# Patient Record
Sex: FEMALE | Race: WHITE | Employment: OTHER | URBAN - METROPOLITAN AREA
[De-identification: names, ages, dates, MRNs, and addresses within clinical notes are randomized per-mention and may not be internally consistent; named-entity substitution may affect disease eponyms.]

---

## 2017-05-11 PROBLEM — E66.01 MORBID OBESITY (HCC): Chronic | Status: ACTIVE | Noted: 2017-05-11

## 2017-05-11 PROBLEM — M15.9 OSTEOARTHRITIS OF MULTIPLE JOINTS: Chronic | Status: ACTIVE | Noted: 2017-05-11

## 2017-05-11 PROBLEM — E66.01 MORBID OBESITY DUE TO EXCESS CALORIES (HCC): Status: ACTIVE | Noted: 2017-05-11

## 2017-05-11 PROBLEM — I10 ESSENTIAL HYPERTENSION: Chronic | Status: ACTIVE | Noted: 2017-05-11

## 2017-05-11 PROBLEM — E03.9 ACQUIRED HYPOTHYROIDISM: Chronic | Status: ACTIVE | Noted: 2017-05-11

## 2018-05-23 ENCOUNTER — HOSPITAL ENCOUNTER (OUTPATIENT)
Age: 73
Discharge: HOME | End: 2018-05-25
Payer: COMMERCIAL

## 2018-05-23 LAB
ALBUMIN SERPL-MCNC: 3.8 G/DL (ref 3.5–5.2)
ALP BLD-CCNC: 89 U/L (ref 35–104)
ALT SERPL-CCNC: 24 U/L (ref 0–32)
ANION GAP SERPL CALCULATED.3IONS-SCNC: 20 MMOL/L (ref 7–16)
AST SERPL-CCNC: 28 U/L (ref 0–31)
BASOPHILS ABSOLUTE: 0.04 E9/L (ref 0–0.2)
BASOPHILS RELATIVE PERCENT: 0.4 % (ref 0–2)
BILIRUB SERPL-MCNC: 0.4 MG/DL (ref 0–1.2)
BUN BLDV-MCNC: 20 MG/DL (ref 8–23)
CALCIUM SERPL-MCNC: 9.7 MG/DL (ref 8.6–10.2)
CHLORIDE BLD-SCNC: 100 MMOL/L (ref 98–107)
CHOLESTEROL, TOTAL: 163 MG/DL (ref 0–199)
CO2: 21 MMOL/L (ref 22–29)
CREAT SERPL-MCNC: 0.9 MG/DL (ref 0.5–1)
EOSINOPHILS ABSOLUTE: 0.2 E9/L (ref 0.05–0.5)
EOSINOPHILS RELATIVE PERCENT: 2 % (ref 0–6)
GFR AFRICAN AMERICAN: >60
GFR NON-AFRICAN AMERICAN: >60 ML/MIN/1.73
GLUCOSE BLD-MCNC: 125 MG/DL (ref 74–109)
HCT VFR BLD CALC: 45.2 % (ref 34–48)
HDLC SERPL-MCNC: 41 MG/DL
HEMOGLOBIN: 14.2 G/DL (ref 11.5–15.5)
IMMATURE GRANULOCYTES #: 0.04 E9/L
IMMATURE GRANULOCYTES %: 0.4 % (ref 0–5)
LDL CHOLESTEROL CALCULATED: 71 MG/DL (ref 0–99)
LYMPHOCYTES ABSOLUTE: 1.97 E9/L (ref 1.5–4)
LYMPHOCYTES RELATIVE PERCENT: 19.9 % (ref 20–42)
MCH RBC QN AUTO: 28.6 PG (ref 26–35)
MCHC RBC AUTO-ENTMCNC: 31.4 % (ref 32–34.5)
MCV RBC AUTO: 90.9 FL (ref 80–99.9)
MONOCYTES ABSOLUTE: 1.02 E9/L (ref 0.1–0.95)
MONOCYTES RELATIVE PERCENT: 10.3 % (ref 2–12)
NEUTROPHILS ABSOLUTE: 6.61 E9/L (ref 1.8–7.3)
NEUTROPHILS RELATIVE PERCENT: 67 % (ref 43–80)
PDW BLD-RTO: 13.2 FL (ref 11.5–15)
PLATELET # BLD: 320 E9/L (ref 130–450)
PMV BLD AUTO: 9.7 FL (ref 7–12)
POTASSIUM SERPL-SCNC: 4.2 MMOL/L (ref 3.5–5)
RBC # BLD: 4.97 E12/L (ref 3.5–5.5)
SODIUM BLD-SCNC: 141 MMOL/L (ref 132–146)
T4 FREE: 1.47 NG/DL (ref 0.93–1.7)
TOTAL PROTEIN: 7.2 G/DL (ref 6.4–8.3)
TRIGL SERPL-MCNC: 253 MG/DL (ref 0–149)
TSH SERPL DL<=0.05 MIU/L-ACNC: 3.01 UIU/ML (ref 0.27–4.2)
VLDLC SERPL CALC-MCNC: 51 MG/DL
WBC # BLD: 9.9 E9/L (ref 4.5–11.5)

## 2018-05-23 PROCEDURE — 80061 LIPID PANEL: CPT

## 2018-05-23 PROCEDURE — 84443 ASSAY THYROID STIM HORMONE: CPT

## 2018-05-23 PROCEDURE — 85025 COMPLETE CBC W/AUTO DIFF WBC: CPT

## 2018-05-23 PROCEDURE — 80053 COMPREHEN METABOLIC PANEL: CPT

## 2018-05-23 PROCEDURE — 84439 ASSAY OF FREE THYROXINE: CPT

## 2018-06-25 ENCOUNTER — HOSPITAL ENCOUNTER (INPATIENT)
Age: 73
LOS: 9 days | Discharge: HOME HEALTH | DRG: 330 | End: 2018-07-05
Attending: EMERGENCY MEDICINE | Admitting: INTERNAL MEDICINE
Payer: COMMERCIAL

## 2018-06-25 DIAGNOSIS — K92.2 LOWER GI BLEED: Primary | ICD-10-CM

## 2018-06-25 DIAGNOSIS — R10.9 ABDOMINAL PAIN, UNSPECIFIED ABDOMINAL LOCATION: ICD-10-CM

## 2018-06-25 PROCEDURE — 94761 N-INVAS EAR/PLS OXIMETRY MLT: CPT

## 2018-06-25 PROCEDURE — 99285 EMERGENCY DEPT VISIT HI MDM: CPT

## 2018-06-25 ASSESSMENT — PAIN DESCRIPTION - DESCRIPTORS: DESCRIPTORS: CRAMPING

## 2018-06-25 ASSESSMENT — PAIN SCALES - GENERAL: PAINLEVEL_OUTOF10: 2

## 2018-06-25 ASSESSMENT — PAIN DESCRIPTION - PAIN TYPE: TYPE: ACUTE PAIN

## 2018-06-25 ASSESSMENT — PAIN DESCRIPTION - FREQUENCY: FREQUENCY: CONTINUOUS

## 2018-06-25 ASSESSMENT — PAIN DESCRIPTION - ORIENTATION: ORIENTATION: LOWER

## 2018-06-25 ASSESSMENT — PAIN DESCRIPTION - LOCATION: LOCATION: ABDOMEN

## 2018-06-26 ENCOUNTER — ANESTHESIA (OUTPATIENT)
Dept: ENDOSCOPY | Age: 73
DRG: 330 | End: 2018-06-26
Payer: COMMERCIAL

## 2018-06-26 ENCOUNTER — ANESTHESIA EVENT (OUTPATIENT)
Dept: ENDOSCOPY | Age: 73
DRG: 330 | End: 2018-06-26
Payer: COMMERCIAL

## 2018-06-26 ENCOUNTER — APPOINTMENT (OUTPATIENT)
Dept: CT IMAGING | Age: 73
DRG: 330 | End: 2018-06-26
Payer: COMMERCIAL

## 2018-06-26 VITALS
RESPIRATION RATE: 7 BRPM | SYSTOLIC BLOOD PRESSURE: 124 MMHG | OXYGEN SATURATION: 99 % | DIASTOLIC BLOOD PRESSURE: 86 MMHG

## 2018-06-26 PROBLEM — K92.2 GI BLEED: Status: ACTIVE | Noted: 2018-06-26

## 2018-06-26 PROBLEM — M15.9 OSTEOARTHRITIS OF MULTIPLE JOINTS: Status: ACTIVE | Noted: 2018-06-26

## 2018-06-26 PROBLEM — I10 ESSENTIAL HYPERTENSION: Chronic | Status: ACTIVE | Noted: 2018-06-26

## 2018-06-26 PROBLEM — D62 ANEMIA DUE TO BLOOD LOSS, ACUTE: Status: ACTIVE | Noted: 2018-06-26

## 2018-06-26 PROBLEM — E03.9 ACQUIRED HYPOTHYROIDISM: Chronic | Status: ACTIVE | Noted: 2018-06-26

## 2018-06-26 PROBLEM — E66.9 CLASS 2 OBESITY IN ADULT: Status: ACTIVE | Noted: 2017-05-11

## 2018-06-26 PROBLEM — E66.812 CLASS 2 OBESITY IN ADULT: Status: ACTIVE | Noted: 2017-05-11

## 2018-06-26 PROBLEM — K57.93 DIVERTICULITIS OF INTESTINE WITH BLEEDING: Status: ACTIVE | Noted: 2018-06-26

## 2018-06-26 LAB
ABO/RH: NORMAL
ALBUMIN SERPL-MCNC: 2.9 G/DL (ref 3.5–5.2)
ALBUMIN SERPL-MCNC: 3.3 G/DL (ref 3.5–5.2)
ALP BLD-CCNC: 66 U/L (ref 35–104)
ALP BLD-CCNC: 78 U/L (ref 35–104)
ALT SERPL-CCNC: 18 U/L (ref 0–32)
ALT SERPL-CCNC: 22 U/L (ref 0–32)
ANION GAP SERPL CALCULATED.3IONS-SCNC: 12 MMOL/L (ref 7–16)
ANION GAP SERPL CALCULATED.3IONS-SCNC: 7 MMOL/L (ref 7–16)
ANTIBODY SCREEN: NORMAL
APTT: 26.7 SEC (ref 24.5–35.1)
AST SERPL-CCNC: 16 U/L (ref 0–31)
AST SERPL-CCNC: 18 U/L (ref 0–31)
BACTERIA: ABNORMAL /HPF
BASOPHILS ABSOLUTE: 0.04 E9/L (ref 0–0.2)
BASOPHILS ABSOLUTE: 0.06 E9/L (ref 0–0.2)
BASOPHILS RELATIVE PERCENT: 0.3 % (ref 0–2)
BASOPHILS RELATIVE PERCENT: 0.4 % (ref 0–2)
BILIRUB SERPL-MCNC: 0.2 MG/DL (ref 0–1.2)
BILIRUB SERPL-MCNC: <0.2 MG/DL (ref 0–1.2)
BILIRUBIN URINE: NEGATIVE
BLOOD BANK DISPENSE STATUS: NORMAL
BLOOD BANK PRODUCT CODE: NORMAL
BLOOD, URINE: ABNORMAL
BPU ID: NORMAL
BUN BLDV-MCNC: 24 MG/DL (ref 8–23)
BUN BLDV-MCNC: 27 MG/DL (ref 8–23)
CALCIUM SERPL-MCNC: 8.6 MG/DL (ref 8.6–10.2)
CALCIUM SERPL-MCNC: 9.1 MG/DL (ref 8.6–10.2)
CHLORIDE BLD-SCNC: 104 MMOL/L (ref 98–107)
CHLORIDE BLD-SCNC: 108 MMOL/L (ref 98–107)
CHP ED QC CHECK: YES
CLARITY: CLEAR
CO2: 25 MMOL/L (ref 22–29)
CO2: 25 MMOL/L (ref 22–29)
COLOR: YELLOW
CREAT SERPL-MCNC: 1 MG/DL (ref 0.5–1)
CREAT SERPL-MCNC: 1.3 MG/DL (ref 0.5–1)
DESCRIPTION BLOOD BANK: NORMAL
EKG ATRIAL RATE: 83 BPM
EKG P AXIS: 22 DEGREES
EKG P-R INTERVAL: 152 MS
EKG Q-T INTERVAL: 384 MS
EKG QRS DURATION: 90 MS
EKG QTC CALCULATION (BAZETT): 451 MS
EKG R AXIS: 45 DEGREES
EKG T AXIS: 8 DEGREES
EKG VENTRICULAR RATE: 83 BPM
EOSINOPHILS ABSOLUTE: 0.04 E9/L (ref 0.05–0.5)
EOSINOPHILS ABSOLUTE: 0.14 E9/L (ref 0.05–0.5)
EOSINOPHILS RELATIVE PERCENT: 0.3 % (ref 0–6)
EOSINOPHILS RELATIVE PERCENT: 1.1 % (ref 0–6)
EPITHELIAL CELLS, UA: ABNORMAL /HPF
GFR AFRICAN AMERICAN: 49
GFR AFRICAN AMERICAN: >60
GFR NON-AFRICAN AMERICAN: 40 ML/MIN/1.73
GFR NON-AFRICAN AMERICAN: 54 ML/MIN/1.73
GLUCOSE BLD-MCNC: 169 MG/DL (ref 74–109)
GLUCOSE BLD-MCNC: 179 MG/DL
GLUCOSE BLD-MCNC: 182 MG/DL (ref 74–109)
GLUCOSE URINE: NEGATIVE MG/DL
HCT VFR BLD CALC: 25.3 % (ref 34–48)
HCT VFR BLD CALC: 27.9 % (ref 34–48)
HCT VFR BLD CALC: 31.2 % (ref 34–48)
HCT VFR BLD CALC: 31.8 % (ref 34–48)
HCT VFR BLD CALC: 37.3 % (ref 34–48)
HEMOGLOBIN: 10.3 G/DL (ref 11.5–15.5)
HEMOGLOBIN: 10.4 G/DL (ref 11.5–15.5)
HEMOGLOBIN: 12.1 G/DL (ref 11.5–15.5)
HEMOGLOBIN: 8.6 G/DL (ref 11.5–15.5)
HEMOGLOBIN: 9.1 G/DL (ref 11.5–15.5)
IMMATURE GRANULOCYTES #: 0.06 E9/L
IMMATURE GRANULOCYTES #: 0.09 E9/L
IMMATURE GRANULOCYTES %: 0.5 % (ref 0–5)
IMMATURE GRANULOCYTES %: 0.6 % (ref 0–5)
INR BLD: 1
KETONES, URINE: NEGATIVE MG/DL
LACTIC ACID: 1.6 MMOL/L (ref 0.5–2.2)
LEUKOCYTE ESTERASE, URINE: NEGATIVE
LYMPHOCYTES ABSOLUTE: 1.74 E9/L (ref 1.5–4)
LYMPHOCYTES ABSOLUTE: 1.81 E9/L (ref 1.5–4)
LYMPHOCYTES RELATIVE PERCENT: 11.6 % (ref 20–42)
LYMPHOCYTES RELATIVE PERCENT: 14.6 % (ref 20–42)
MCH RBC QN AUTO: 28.5 PG (ref 26–35)
MCH RBC QN AUTO: 28.9 PG (ref 26–35)
MCHC RBC AUTO-ENTMCNC: 32.4 % (ref 32–34.5)
MCHC RBC AUTO-ENTMCNC: 32.7 % (ref 32–34.5)
MCV RBC AUTO: 87.8 FL (ref 80–99.9)
MCV RBC AUTO: 88.3 FL (ref 80–99.9)
METER GLUCOSE: 200 MG/DL (ref 70–110)
MONOCYTES ABSOLUTE: 0.75 E9/L (ref 0.1–0.95)
MONOCYTES ABSOLUTE: 1.13 E9/L (ref 0.1–0.95)
MONOCYTES RELATIVE PERCENT: 5 % (ref 2–12)
MONOCYTES RELATIVE PERCENT: 9.1 % (ref 2–12)
NEUTROPHILS ABSOLUTE: 12.27 E9/L (ref 1.8–7.3)
NEUTROPHILS ABSOLUTE: 9.2 E9/L (ref 1.8–7.3)
NEUTROPHILS RELATIVE PERCENT: 74.4 % (ref 43–80)
NEUTROPHILS RELATIVE PERCENT: 82.1 % (ref 43–80)
NITRITE, URINE: NEGATIVE
PDW BLD-RTO: 13.1 FL (ref 11.5–15)
PDW BLD-RTO: 13.4 FL (ref 11.5–15)
PH UA: 5 (ref 5–9)
PLATELET # BLD: 281 E9/L (ref 130–450)
PLATELET # BLD: 286 E9/L (ref 130–450)
PMV BLD AUTO: 10.4 FL (ref 7–12)
PMV BLD AUTO: 9.4 FL (ref 7–12)
POC ANION GAP: 17
POC BUN: 28
POC CHLORIDE: 105
POC CO2: 25
POC CREATININE: 1.3
POC POTASSIUM: NORMAL
POC SODIUM: 142
POTASSIUM SERPL-SCNC: 4.2 MMOL/L (ref 3.5–5)
POTASSIUM SERPL-SCNC: 4.2 MMOL/L (ref 3.5–5)
PROTEIN UA: NEGATIVE MG/DL
PROTHROMBIN TIME: 11.3 SEC (ref 9.3–12.4)
RBC # BLD: 3.6 E12/L (ref 3.5–5.5)
RBC # BLD: 4.25 E12/L (ref 3.5–5.5)
RBC UA: ABNORMAL /HPF (ref 0–2)
SODIUM BLD-SCNC: 140 MMOL/L (ref 132–146)
SODIUM BLD-SCNC: 141 MMOL/L (ref 132–146)
SPECIFIC GRAVITY UA: 1.01 (ref 1–1.03)
TOTAL PROTEIN: 5.5 G/DL (ref 6.4–8.3)
TOTAL PROTEIN: 6.4 G/DL (ref 6.4–8.3)
TROPONIN: <0.01 NG/ML (ref 0–0.03)
UROBILINOGEN, URINE: 0.2 E.U./DL
WBC # BLD: 12.4 E9/L (ref 4.5–11.5)
WBC # BLD: 15 E9/L (ref 4.5–11.5)
WBC UA: ABNORMAL /HPF (ref 0–5)

## 2018-06-26 PROCEDURE — 3700000001 HC ADD 15 MINUTES (ANESTHESIA): Performed by: INTERNAL MEDICINE

## 2018-06-26 PROCEDURE — 85018 HEMOGLOBIN: CPT

## 2018-06-26 PROCEDURE — C9113 INJ PANTOPRAZOLE SODIUM, VIA: HCPCS | Performed by: INTERNAL MEDICINE

## 2018-06-26 PROCEDURE — 6360000002 HC RX W HCPCS: Performed by: NURSE ANESTHETIST, CERTIFIED REGISTERED

## 2018-06-26 PROCEDURE — 36415 COLL VENOUS BLD VENIPUNCTURE: CPT

## 2018-06-26 PROCEDURE — P9016 RBC LEUKOCYTES REDUCED: HCPCS

## 2018-06-26 PROCEDURE — 74177 CT ABD & PELVIS W/CONTRAST: CPT

## 2018-06-26 PROCEDURE — 2580000003 HC RX 258: Performed by: INTERNAL MEDICINE

## 2018-06-26 PROCEDURE — 93005 ELECTROCARDIOGRAM TRACING: CPT | Performed by: EMERGENCY MEDICINE

## 2018-06-26 PROCEDURE — 87081 CULTURE SCREEN ONLY: CPT

## 2018-06-26 PROCEDURE — 3609027000 HC COLONOSCOPY: Performed by: INTERNAL MEDICINE

## 2018-06-26 PROCEDURE — 85730 THROMBOPLASTIN TIME PARTIAL: CPT

## 2018-06-26 PROCEDURE — 6360000004 HC RX CONTRAST MEDICATION: Performed by: RADIOLOGY

## 2018-06-26 PROCEDURE — C1773 RET DEV, INSERTABLE: HCPCS | Performed by: INTERNAL MEDICINE

## 2018-06-26 PROCEDURE — 86901 BLOOD TYPING SEROLOGIC RH(D): CPT

## 2018-06-26 PROCEDURE — 86900 BLOOD TYPING SEROLOGIC ABO: CPT

## 2018-06-26 PROCEDURE — 83605 ASSAY OF LACTIC ACID: CPT

## 2018-06-26 PROCEDURE — 2580000003 HC RX 258: Performed by: NURSE ANESTHETIST, CERTIFIED REGISTERED

## 2018-06-26 PROCEDURE — 36430 TRANSFUSION BLD/BLD COMPNT: CPT

## 2018-06-26 PROCEDURE — 6370000000 HC RX 637 (ALT 250 FOR IP): Performed by: INTERNAL MEDICINE

## 2018-06-26 PROCEDURE — 6360000002 HC RX W HCPCS: Performed by: INTERNAL MEDICINE

## 2018-06-26 PROCEDURE — 80053 COMPREHEN METABOLIC PANEL: CPT

## 2018-06-26 PROCEDURE — 85025 COMPLETE CBC W/AUTO DIFF WBC: CPT

## 2018-06-26 PROCEDURE — 2580000003 HC RX 258: Performed by: EMERGENCY MEDICINE

## 2018-06-26 PROCEDURE — 85014 HEMATOCRIT: CPT

## 2018-06-26 PROCEDURE — 0DJD8ZZ INSPECTION OF LOWER INTESTINAL TRACT, VIA NATURAL OR ARTIFICIAL OPENING ENDOSCOPIC: ICD-10-PCS | Performed by: INTERNAL MEDICINE

## 2018-06-26 PROCEDURE — 84484 ASSAY OF TROPONIN QUANT: CPT

## 2018-06-26 PROCEDURE — 2000000000 HC ICU R&B

## 2018-06-26 PROCEDURE — 86850 RBC ANTIBODY SCREEN: CPT

## 2018-06-26 PROCEDURE — 81001 URINALYSIS AUTO W/SCOPE: CPT

## 2018-06-26 PROCEDURE — 86923 COMPATIBILITY TEST ELECTRIC: CPT

## 2018-06-26 PROCEDURE — 2709999900 HC NON-CHARGEABLE SUPPLY: Performed by: INTERNAL MEDICINE

## 2018-06-26 PROCEDURE — 85610 PROTHROMBIN TIME: CPT

## 2018-06-26 PROCEDURE — 3700000000 HC ANESTHESIA ATTENDED CARE: Performed by: INTERNAL MEDICINE

## 2018-06-26 PROCEDURE — 82962 GLUCOSE BLOOD TEST: CPT

## 2018-06-26 RX ORDER — 0.9 % SODIUM CHLORIDE 0.9 %
1000 INTRAVENOUS SOLUTION INTRAVENOUS ONCE
Status: COMPLETED | OUTPATIENT
Start: 2018-06-26 | End: 2018-06-26

## 2018-06-26 RX ORDER — 0.9 % SODIUM CHLORIDE 0.9 %
250 INTRAVENOUS SOLUTION INTRAVENOUS ONCE
Status: COMPLETED | OUTPATIENT
Start: 2018-06-26 | End: 2018-06-27

## 2018-06-26 RX ORDER — SENNA PLUS 8.6 MG/1
1 TABLET ORAL NIGHTLY
Status: DISCONTINUED | OUTPATIENT
Start: 2018-06-26 | End: 2018-06-26

## 2018-06-26 RX ORDER — 0.9 % SODIUM CHLORIDE 0.9 %
10 VIAL (ML) INJECTION DAILY
Status: DISCONTINUED | OUTPATIENT
Start: 2018-06-26 | End: 2018-07-05 | Stop reason: HOSPADM

## 2018-06-26 RX ORDER — DEXTROSE AND SODIUM CHLORIDE 5; .45 G/100ML; G/100ML
INJECTION, SOLUTION INTRAVENOUS CONTINUOUS
Status: DISCONTINUED | OUTPATIENT
Start: 2018-06-26 | End: 2018-06-27

## 2018-06-26 RX ORDER — PROPOFOL 10 MG/ML
INJECTION, EMULSION INTRAVENOUS PRN
Status: DISCONTINUED | OUTPATIENT
Start: 2018-06-26 | End: 2018-06-26 | Stop reason: SDUPTHER

## 2018-06-26 RX ORDER — 0.9 % SODIUM CHLORIDE 0.9 %
10 VIAL (ML) INJECTION PRN
Status: DISCONTINUED | OUTPATIENT
Start: 2018-06-26 | End: 2018-07-05 | Stop reason: HOSPADM

## 2018-06-26 RX ORDER — 0.9 % SODIUM CHLORIDE 0.9 %
10 VIAL (ML) INJECTION EVERY 12 HOURS SCHEDULED
Status: DISCONTINUED | OUTPATIENT
Start: 2018-06-26 | End: 2018-07-05 | Stop reason: HOSPADM

## 2018-06-26 RX ORDER — 0.9 % SODIUM CHLORIDE 0.9 %
500 INTRAVENOUS SOLUTION INTRAVENOUS ONCE
Status: COMPLETED | OUTPATIENT
Start: 2018-06-26 | End: 2018-06-26

## 2018-06-26 RX ORDER — LEVOTHYROXINE SODIUM 0.05 MG/1
50 TABLET ORAL DAILY
Status: DISCONTINUED | OUTPATIENT
Start: 2018-06-26 | End: 2018-07-05 | Stop reason: HOSPADM

## 2018-06-26 RX ORDER — PANTOPRAZOLE SODIUM 40 MG/10ML
40 INJECTION, POWDER, LYOPHILIZED, FOR SOLUTION INTRAVENOUS DAILY
Status: DISCONTINUED | OUTPATIENT
Start: 2018-06-26 | End: 2018-06-30

## 2018-06-26 RX ORDER — SODIUM CHLORIDE 9 MG/ML
INJECTION, SOLUTION INTRAVENOUS CONTINUOUS PRN
Status: DISCONTINUED | OUTPATIENT
Start: 2018-06-26 | End: 2018-06-26 | Stop reason: SDUPTHER

## 2018-06-26 RX ORDER — 0.9 % SODIUM CHLORIDE 0.9 %
250 INTRAVENOUS SOLUTION INTRAVENOUS ONCE
Status: COMPLETED | OUTPATIENT
Start: 2018-06-27 | End: 2018-06-27

## 2018-06-26 RX ADMIN — DEXTROSE AND SODIUM CHLORIDE: 5; 450 INJECTION, SOLUTION INTRAVENOUS at 17:07

## 2018-06-26 RX ADMIN — SODIUM CHLORIDE 250 ML: 9 INJECTION, SOLUTION INTRAVENOUS at 12:58

## 2018-06-26 RX ADMIN — Medication 10 ML: at 20:40

## 2018-06-26 RX ADMIN — Medication 10 ML: at 08:38

## 2018-06-26 RX ADMIN — PIPERACILLIN SODIUM AND TAZOBACTAM SODIUM 3.38 G: 3; .375 INJECTION, POWDER, LYOPHILIZED, FOR SOLUTION INTRAVENOUS at 15:08

## 2018-06-26 RX ADMIN — PROPOFOL 950 MG: 10 INJECTION, EMULSION INTRAVENOUS at 18:55

## 2018-06-26 RX ADMIN — PIPERACILLIN SODIUM AND TAZOBACTAM SODIUM 3.38 G: 3; .375 INJECTION, POWDER, LYOPHILIZED, FOR SOLUTION INTRAVENOUS at 08:09

## 2018-06-26 RX ADMIN — METOPROLOL TARTRATE 25 MG: 25 TABLET ORAL at 20:40

## 2018-06-26 RX ADMIN — PANTOPRAZOLE SODIUM 40 MG: 40 INJECTION, POWDER, FOR SOLUTION INTRAVENOUS at 08:08

## 2018-06-26 RX ADMIN — IOPAMIDOL 80 ML: 755 INJECTION, SOLUTION INTRAVENOUS at 01:21

## 2018-06-26 RX ADMIN — SODIUM CHLORIDE 500 ML: 9 INJECTION, SOLUTION INTRAVENOUS at 08:43

## 2018-06-26 RX ADMIN — DEXTROSE AND SODIUM CHLORIDE: 5; 450 INJECTION, SOLUTION INTRAVENOUS at 04:00

## 2018-06-26 RX ADMIN — SODIUM CHLORIDE: 9 INJECTION, SOLUTION INTRAVENOUS at 18:13

## 2018-06-26 RX ADMIN — SODIUM CHLORIDE 1000 ML: 9 INJECTION, SOLUTION INTRAVENOUS at 00:39

## 2018-06-26 RX ADMIN — PIPERACILLIN SODIUM AND TAZOBACTAM SODIUM 3.38 G: 3; .375 INJECTION, POWDER, LYOPHILIZED, FOR SOLUTION INTRAVENOUS at 23:00

## 2018-06-26 ASSESSMENT — PAIN DESCRIPTION - FREQUENCY: FREQUENCY: INTERMITTENT

## 2018-06-26 ASSESSMENT — ENCOUNTER SYMPTOMS
ABDOMINAL PAIN: 0
VOMITING: 0
NAUSEA: 0
EYE REDNESS: 0
SHORTNESS OF BREATH: 0
BLOOD IN STOOL: 1

## 2018-06-26 ASSESSMENT — PAIN SCALES - GENERAL
PAINLEVEL_OUTOF10: 2
PAINLEVEL_OUTOF10: 0
PAINLEVEL_OUTOF10: 0

## 2018-06-26 ASSESSMENT — PAIN DESCRIPTION - PAIN TYPE: TYPE: ACUTE PAIN

## 2018-06-26 ASSESSMENT — PAIN DESCRIPTION - LOCATION: LOCATION: ABDOMEN

## 2018-06-26 ASSESSMENT — PAIN DESCRIPTION - DESCRIPTORS
DESCRIPTORS: CRAMPING
DESCRIPTORS: CRAMPING

## 2018-06-26 ASSESSMENT — PAIN DESCRIPTION - ORIENTATION: ORIENTATION: MID

## 2018-06-26 NOTE — PROGRESS NOTES
Dr Wen Veloz cell phone called to update on patient. H&H dropped to 9.1. Will transfuse 2 PRBC per previous order.

## 2018-06-26 NOTE — H&P
Patient ID:  Patrice Andrade  [id number]  89 y.o.  1945    Chief Complaint: Patient admitted through emergency room with multiple bouts of acute bleeding, had similar episode 4 years ago from diverticular bleed, had CT scan abdomen done. History of present illness:  Patient is a 68 y.o. patient admitted through emergency room with lower abdominal pain, multiple bouts of denies amount of bright red blood per rectum, CT scan showed early diverticulitis with wall thickening of the colon white blood count elevated, patient continues to drop her blood count from 14 g percent down to 10.4 g percent.   Patient Active Problem List   Diagnosis    Essential hypertension    Acquired hypothyroidism    Class 2 obesity in adult    Osteoarthritis of multiple joints    GI bleed    Diverticulitis of intestine with bleeding    Anemia due to blood loss, acute    Essential hypertension    Acquired hypothyroidism     Past Medical History:   Diagnosis Date    Arthritis     History of cardiovascular stress test 6/2015    lexiscan    Hypertension     Sciatica     Right    Spinal headache     Tachycardia     on metoprolol ,paroxysmal, usually at HS    Thyroid disease       Past Surgical History:   Procedure Laterality Date    COLONOSCOPY      ENDOSCOPY, COLON, DIAGNOSTIC      HEEL SPUR SURGERY      KNEE CARTILAGE SURGERY      OTHER SURGICAL HISTORY Right 05/09/2017    right TKA    TUBAL LIGATION        Prescriptions Prior to Admission: metoprolol tartrate (LOPRESSOR) 25 MG tablet, Take 25 mg by mouth 2 times daily  traMADol (ULTRAM) 50 MG tablet, Take 50 mg by mouth every 6 hours as needed for Pain  losartan-hydrochlorothiazide (HYZAAR) 50-12.5 MG per tablet, Take 1 tablet by mouth daily  celecoxib (CELEBREX) 200 MG capsule, Take 200 mg by mouth nightly   levothyroxine (SYNTHROID) 50 MCG tablet, Take 50 mcg by mouth Daily  aspirin 325 MG EC tablet, Take 1 tablet by mouth 2 times daily  Misc Natural Products calculus centrally in the left kidney. No other focal or malleable abdominal solid organs. Bowel and bile ducts and urinary tract appear normal caliber. Multiple colonic diverticula. Mild stranding in the mesentery adjacent to the distal descending and sigmoid segments of the colon. Associated mural thickening. Small nodules within the mesentery ranging up to about 12 mm in diameter. No extraluminal air or fluid collection. Unremarkable arterial enhancement. Scattered atherosclerosis. Multilevel spondylosis. Grade 1 L5-S1 spondylolisthesis. 1. No evidence of urinary or bowel obstruction. 2. Abnormal appearance the distal colon with diverticula, mild mural thickening and mild pericolonic stranding. Small nodules are noted within the mesentery which could be focally enlarged lymph nodes range up to about 12 mm in diameter. Differential includes low-grade diverticulitis or malignancy. Final report differs from that generated by Mirna Laguna. These findings were communicated to radiology department personnel by critical results form for further communication to the requesting physician at the conclusion of this examination. Assessment    Patient Active Problem List   Diagnosis    Essential hypertension    Acquired hypothyroidism    Class 2 obesity in adult    Osteoarthritis of multiple joints    GI bleed    Diverticulitis of intestine with bleeding    Anemia due to blood loss, acute    Essential hypertension    Acquired hypothyroidism       Plan     See my orders    Admit to ICU, tenderness every 6 hours, GI consult, IV Zosyn, SCDs for DVT prophylaxis, continue home medication metoprolol, thyroid, hold losartan, hold Celebrex.     Completed By:  [facility]. Jose Grande MD, [facility].  7:32 AM  6/26/2018      Electronically signed by Sendy Cohen MD on 6/26/18 at 7:32 AM

## 2018-06-26 NOTE — ED TRIAGE NOTES
Pt states that she has had 7 BM with large amounts of blood since 2200. Blood is both bright and some dark per pt. Having some cramping in lower abdomen. Denies Emesis, Denies fever at home.

## 2018-06-26 NOTE — CONSULTS
for Pain   Yes Historical Provider, MD   losartan-hydrochlorothiazide (HYZAAR) 50-12.5 MG per tablet Take 1 tablet by mouth daily   Yes Historical Provider, MD   celecoxib (CELEBREX) 200 MG capsule Take 200 mg by mouth nightly    Yes Historical Provider, MD   levothyroxine (SYNTHROID) 50 MCG tablet Take 50 mcg by mouth Daily   Yes Historical Provider, MD   aspirin 325 MG EC tablet Take 1 tablet by mouth 2 times daily 5/11/17   DO Manuel Boucher Natural Products (GLUCOSAMINE CHONDROITIN ADV PO) Take 1 tablet by mouth daily    Historical Provider, MD   vitamin D-3 (CHOLECALCIFEROL) 5000 UNITS TABS Take 5,000 Units by mouth daily    Historical Provider, MD   aspirin 81 MG chewable tablet Take 81 mg by mouth daily    Historical Provider, MD       ALLERGIES:  Talwin [pentazocine]; Tetracyclines & related; and Codeine    SOCIAL Hx:  Social History     Social History    Marital status: Single     Spouse name: N/A    Number of children: N/A    Years of education: N/A     Occupational History    Not on file. Social History Main Topics    Smoking status: Never Smoker    Smokeless tobacco: Never Used    Alcohol use Yes      Comment: social    Drug use: No    Sexual activity: Not on file     Other Topics Concern    Not on file     Social History Narrative    No narrative on file       PE:  /68   Pulse 76   Temp 97.8 °F (36.6 °C) (Oral)   Resp 21   Ht 5' 4\" (1.626 m)   Wt 242 lb 8 oz (110 kg)   SpO2 98%   BMI 41.63 kg/m²     General: A&Ox 3, friendly, NAD  HEENT: Atraumatic, symmetric, no anterior/posterior lymphadenopathy, moist mucous membranes, PERRL, EOM intact, fair dentition.    Pulm: CTAB, Neg w/r/r, normal chest expansion, no crackles noted  Cardio: RRR, neg m/r/g, nl S1 and S2, no extra heart sounds  Abd.: soft, NT, ND, BS+, no G/R, no HSM  Ext: +2/4 pulse Dp and radial b/l, LE and UE ROM intact,   Skin: No lesions, excoriations, petechiae, or ecchymoses noted    Neuro: normal sensation throughout, DTRs patellar, tricept, and bicept 2/4 b/l and equal, normal muscle strength throughout. DATA:     Lab Results   Component Value Date    WBC 15.0 06/26/2018    RBC 3.60 06/26/2018    HGB 10.4 06/26/2018    HCT 31.8 06/26/2018    MCV 88.3 06/26/2018    MCH 28.9 06/26/2018    MCHC 32.7 06/26/2018    RDW 13.4 06/26/2018     06/26/2018    MPV 10.4 06/26/2018     Lab Results   Component Value Date     06/26/2018    K 4.2 06/26/2018     06/26/2018    CO2 25 06/26/2018    BUN 24 06/26/2018    CREATININE 1.0 06/26/2018    CALCIUM 8.6 06/26/2018    PROT 5.5 06/26/2018    LABALBU 2.9 06/26/2018    LABALBU 4.2 03/22/2012    BILITOT 0.2 06/26/2018    ALKPHOS 66 06/26/2018    AST 16 06/26/2018    ALT 18 06/26/2018     No results found for: LIPASE  No results found for: AMYLASE      ASSESSMENT/PLAN:  1. Acute blood loss anemia, Hematochezia: likely diverticular given CT results. CT revealing distal diverticula, mild mural thickening and pericolonic stranding. Small nodules in mesentery ~1.2cm. No free air noted on exam. No peritoneal signs. · H&H Q6hr, will defer to primary for transfusion needs  · NPO status sips with meds, started last night  · Keep 2 units on hold at all times  · She is on no Anticoagulation, NSAID, and antiplatelets  · Will discuss with Dr. Teresa Zhao and potentially plan on colonoscopy with clipping/injection, if so we will order a rapid purge prep, 3 tap water enema an hour apart the last given ~1hr prior to the procedure. Other medical issues managed on this admission  · OA  · HTN  · Sciatica  · Thyroid disease    Pt was seen, d/w, and examined with Dr. Teresa Zhao and Dr. Ese Preston, DO  6/26/2018  8:19 AM     Pt seen and independently examined. D/w Dr. Jailene Schwab with physical exam and A&P. Above discussed with the patient - all questions answered to her satisfaction and she is agreeable with the plan as delineated.   Thank you for the opportunity to see this patient in consultation.       Carli Robison MD  6/26/2018  1:02 PM

## 2018-06-26 NOTE — PLAN OF CARE
Problem: Fluid Volume - Imbalance:  Goal: Will show no signs and symptoms of excessive bleeding  Will show no signs and symptoms of excessive bleeding  Outcome: Not Met This Shift  Active lower bright red blood from rectum  Goal: Absence of imbalanced fluid volume signs and symptoms  Absence of imbalanced fluid volume signs and symptoms  Outcome: Not Met This Shift

## 2018-06-26 NOTE — ED PROVIDER NOTES
Chief complaint: Rectal bleeding and abdominal pain    HPI    The patient is a 80-year-old female presenting to the emergency department with the chief complaint of rectal bleeding and abdominal pain. The patient states that she began approximately 2 hours prior to arrival in the emergency department with rectal bleeding and abdominal pain. She felt she was going to have an episode of diarrhea and went to the bathroom did have a large bloody bowel movement. This was dark and bright red in nature. The patient has had 8 bowel movements in the last 2 hours. She does have a left cramping abdominal pain which is present prior to her having a bloody bowel movement and then resolve with having the bloody bowel movement. The patient has not tried any treatments prior to arrival. Nothing makes the patient's symptoms worsen. The cramping is mildly relieved with having a bowel movement. The patient is not on any blood thinners but does take aspirin on a daily basis. She does have a history of rectal bleeding which was secondary to diverticulitis. She states this does feel very similar. This happened 4 years ago. She did follow with gastroenterology. The patient does complain of feeling lightheaded at the current time as she is going to pass out. This is worse with standing up. She denies any fevers, chills, chest pain, shortness of breath, dysuria or hematuria. Review of Systems   Constitutional: Positive for fatigue. Negative for chills. HENT: Negative for congestion. Eyes: Negative for redness. Respiratory: Negative for shortness of breath. Cardiovascular: Negative for chest pain. Gastrointestinal: Positive for blood in stool. Negative for abdominal pain, nausea and vomiting. Genitourinary: Negative for dysuria. Musculoskeletal: Negative for arthralgias. Skin: Negative for rash. Neurological: Positive for dizziness and light-headedness. Psychiatric/Behavioral: Negative for confusion.        Physical Exam   Constitutional: She is oriented to person, place, and time. She appears well-developed and well-nourished. Non-toxic appearance. No distress. HENT:   Head: Normocephalic and atraumatic. Right Ear: Hearing and external ear normal.   Left Ear: Hearing and external ear normal.   Nose: Nose normal.   Mouth/Throat: Mucous membranes are normal.   Eyes: Conjunctivae are normal. No scleral icterus. Cardiovascular: Normal rate, regular rhythm, S1 normal, S2 normal, normal heart sounds and normal pulses. Pulmonary/Chest: Effort normal and breath sounds normal. No accessory muscle usage. No respiratory distress. She has no decreased breath sounds. She has no wheezes. She has no rhonchi. She has no rales. Abdominal: Soft. There is tenderness in the left upper quadrant and left lower quadrant. There is no rigidity, no rebound and no guarding. The patient's abdomen is soft, mildly tender in the left lower and upper quadrant. No rebound, rigidity or guarding. Genitourinary:   Genitourinary Comments: There is bright red guiac positive blood present on rectal examination. No external hemorrhoids. Musculoskeletal: Normal range of motion. Neurological: She is alert and oriented to person, place, and time. GCS eye subscore is 4. GCS verbal subscore is 5. GCS motor subscore is 6. Skin: Skin is warm and dry. She is not diaphoretic. Psychiatric: She has a normal mood and affect. Procedures    MDM    ED Course as of Jun 26 0238 Tue Jun 26, 2018   0117 The patient is currently in imaging. [MT]   2403 The patient sitting in the bed in no acute distress. The results of today to this point were discussed with the patient. Pending CT results. The patient is agreeable to admission. [MT]      ED Course User Index  [MT] Leydi Carlton, DO       ED Course as of Jun 26 0238   Tue Jun 26, 2018   0117 The patient is currently in imaging. [MT]   4400 The patient sitting in the bed in no acute distress.  The results of today to this point were discussed with the patient. Pending CT results. The patient is agreeable to admission. [MT]      ED Course User Index  [MT] Tracy Kaplan DO     The patient is a 67-year-old female presenting to the emergency department the chief complaint of abdominal pain and rectal bleeding. Patient did have one similar imaging was reviewed. Patient did have large bloody bowel movement in the emergency department. Did receive IV fluids and had improvement in her blood pressure. She'll be admitted for further treatment and evaluation.  --------------------------------------------- PAST HISTORY ---------------------------------------------  Past Medical History:  has a past medical history of Arthritis; History of cardiovascular stress test; Hypertension; Sciatica; Spinal headache; Tachycardia; and Thyroid disease. Past Surgical History:  has a past surgical history that includes Tubal ligation; Heel spur surgery; Knee cartilage surgery; Colonoscopy; Endoscopy, colon, diagnostic; and other surgical history (Right, 05/09/2017). Social History:  reports that she has never smoked. She has never used smokeless tobacco. She reports that she drinks alcohol. She reports that she does not use drugs. Family History: family history includes Cancer in her father and mother. The patients home medications have been reviewed. Allergies: Talwin [pentazocine];  Tetracyclines & related; and Codeine    -------------------------------------------------- RESULTS -------------------------------------------------    LABS:  Results for orders placed or performed during the hospital encounter of 06/25/18   CBC auto differential   Result Value Ref Range    WBC 12.4 (H) 4.5 - 11.5 E9/L    RBC 4.25 3.50 - 5.50 E12/L    Hemoglobin 12.1 11.5 - 15.5 g/dL    Hematocrit 37.3 34.0 - 48.0 %    MCV 87.8 80.0 - 99.9 fL    MCH 28.5 26.0 - 35.0 pg    MCHC 32.4 32.0 - 34.5 %    RDW 13.1 11.5 - 15.0 fL    Platelets (Results Pending)      CT abdomen and pelvis read by Yehuda: No inflammatory changes in the abdomen or pelvis. Left colonic diverticulosis but no CT evidence for diverticulitis. No bowel structure in. No free air. EKG: This EKG is signed and interpreted by me. Rate: 83  Rhythm: Normal sinus rhythm  Interpretation: Normal sinus rhythm with no acute ischemic changes. Comparison: stable as compared to patient's most recent EKG      ------------------------- NURSING NOTES AND VITALS REVIEWED ---------------------------  Date / Time Roomed:  6/25/2018 11:53 PM  ED Bed Assignment:  14/14    The nursing notes within the ED encounter and vital signs as below have been reviewed. Patient Vitals for the past 24 hrs:   BP Temp Temp src Pulse Resp SpO2 Height Weight   06/26/18 0229 122/70 98.6 °F (37 °C) Oral 72 20 98 % - -   06/26/18 0125 132/74 98.4 °F (36.9 °C) Oral 74 18 98 % - -   06/25/18 2357 103/88 97.6 °F (36.4 °C) Oral 94 20 98 % 5' 4\" (1.626 m) 245 lb 3.2 oz (111.2 kg)       Oxygen Saturation Interpretation: Normal    ------------------------------------------ PROGRESS NOTES ------------------------------------------  Re-evaluation(s):  See ED course    Counseling:  I have spoken with the patient and discussed todays results, in addition to providing specific details for the plan of care and counseling regarding the diagnosis and prognosis. Their questions are answered at this time and they are agreeable with the plan of admission.    --------------------------------- ADDITIONAL PROVIDER NOTES ---------------------------------  Consultations:  Time: 2501 [facility] with Dr. Isaura Alvarez  Discussed case. They will admit the patient.   This patient's ED course included: a personal history and physicial examination, re-evaluation prior to disposition, multiple bedside re-evaluations, cardiac monitoring and continuous pulse oximetry    This patient has remained hemodynamically stable during their ED course. Diagnosis:  1. Lower GI bleed    2. Abdominal pain, unspecified abdominal location        Disposition:  Patient's disposition: Admit to telemetry  Patient's condition is serious. Gualberto Heard DO  Resident  06/26/18 [id number],   Resident  06/26/18 5908      ATTENDING PROVIDER ATTESTATION:     I have personally performed and/or participated in the history, exam, medical decision making, and procedures and agree with all pertinent clinical information. I have also reviewed and agree with the past medical, family and social history unless otherwise noted. I have discussed this patient in detail with the resident, and provided the instruction and education regarding rectal bleeding, abdominal pain and diverticulitis. My findings/Plan: Heart RRR. Lungs CTA bilaterally. Abdomen soft. Tender LUQ, LLQ. Bowel sounds normal. Will admit for further evaluation and treatment.          0470 [facility],   06/26/18 3500

## 2018-06-26 NOTE — PROGRESS NOTES
Patient A&Ox4 on room air, assisted patient to bathroom, ambulated with no issues. Patient had large bloody BM, became diaphoretic, and fainted. At that time patients heart rate went into the 40's, BP and O2 stable. RRT called. Patient started talking so nurses put patient back into the bed. , HR 76, /65, 97 on room air. Patient pale still. Stat H&H sent, NS bolus. GI doctors notified and will notify Kyaw Calderon when H&H comes back.  Will continue to monitor patient

## 2018-06-26 NOTE — SIGNIFICANT EVENT
Hospitalist RRT/Code Blue Note    Subjective:    Called to bedside for RRT related to syncopal episode. Patient here with lower GI bleed. She was sitting on commode having bloody bowel movement when she got diaphoretic and then lost consciousness. Nurse stated she got bradycardic on monitor into the 40's. Staff positioned bed closer to the commode and they're able to help her into bed. When I arrived,, patient was back in bed she was awake looked a little pale and diaphoretic. She was answering questions appropriately. Initial blood pressure is 172/79 supine. .  metoprolol tartrate  25 mg Oral BID    levothyroxine  50 mcg Oral Daily    pantoprazole  40 mg Intravenous Daily    And    sodium chloride (PF)  10 mL Intravenous Daily    piperacillin-tazobactam  3.375 g Intravenous Q8H    sodium chloride  500 mL Intravenous Once    sodium chloride  250 mL Intravenous Once           Objective:    /68   Pulse 76   Temp 97.8 °F (36.6 °C) (Oral)   Resp 21   Ht 5' 4\" (1.626 m)   Wt 242 lb 8 oz (110 kg)   SpO2 98%   BMI 41.63 kg/m²      General Appearance: alert but appears pale and face diaphoretic. Skin: cool and clammy.   Head: normocephalic and atraumatic  Eyes: pupils equal, round, and reactive to light, extraocular eye movements intact, conjunctivae normal  ENT: external ear and ear canal normal bilaterally, nose without deformity  Neck: supple and non-tender without mass, no cervical lymphadenopathy  Pulmonary/Chest: clear to auscultation bilaterally- no wheezes, rales or rhonchi  Cardiovascular: normal rate, regular rhythm, normal S1 and S2, no murmurs, rubs, clicks, or gallops  Abdomen: soft, non-tender, non-distended, normal bowel sounds, no masses or organomegaly  Extremities: no cyanosis, clubbing or edema        Recent Labs      06/26/18   0040  06/26/18   0045  06/26/18   0600   NA  141   --   140   K  4.2   --   4.2   CL  104   --   108*   CO2  25   --   25   BUN  27*   --

## 2018-06-27 ENCOUNTER — ANESTHESIA (OUTPATIENT)
Dept: ENDOSCOPY | Age: 73
DRG: 330 | End: 2018-06-27
Payer: COMMERCIAL

## 2018-06-27 ENCOUNTER — ANESTHESIA EVENT (OUTPATIENT)
Dept: ENDOSCOPY | Age: 73
DRG: 330 | End: 2018-06-27
Payer: COMMERCIAL

## 2018-06-27 ENCOUNTER — APPOINTMENT (OUTPATIENT)
Dept: NUCLEAR MEDICINE | Age: 73
DRG: 330 | End: 2018-06-27
Payer: COMMERCIAL

## 2018-06-27 ENCOUNTER — APPOINTMENT (OUTPATIENT)
Dept: CT IMAGING | Age: 73
DRG: 330 | End: 2018-06-27
Payer: COMMERCIAL

## 2018-06-27 VITALS — OXYGEN SATURATION: 100 % | DIASTOLIC BLOOD PRESSURE: 64 MMHG | SYSTOLIC BLOOD PRESSURE: 140 MMHG

## 2018-06-27 LAB
ALBUMIN SERPL-MCNC: 2.8 G/DL (ref 3.5–5.2)
ALP BLD-CCNC: 56 U/L (ref 35–104)
ALT SERPL-CCNC: 16 U/L (ref 0–32)
ANION GAP SERPL CALCULATED.3IONS-SCNC: 11 MMOL/L (ref 7–16)
ANION GAP SERPL CALCULATED.3IONS-SCNC: 9 MMOL/L (ref 7–16)
AST SERPL-CCNC: 18 U/L (ref 0–31)
BASOPHILS ABSOLUTE: 0.05 E9/L (ref 0–0.2)
BASOPHILS RELATIVE PERCENT: 0.3 % (ref 0–2)
BILIRUB SERPL-MCNC: 0.4 MG/DL (ref 0–1.2)
BLOOD BANK DISPENSE STATUS: NORMAL
BLOOD BANK PRODUCT CODE: NORMAL
BPU ID: NORMAL
BUN BLDV-MCNC: 10 MG/DL (ref 8–23)
BUN BLDV-MCNC: 9 MG/DL (ref 8–23)
CALCIUM SERPL-MCNC: 8 MG/DL (ref 8.6–10.2)
CALCIUM SERPL-MCNC: 8.5 MG/DL (ref 8.6–10.2)
CHLORIDE BLD-SCNC: 105 MMOL/L (ref 98–107)
CHLORIDE BLD-SCNC: 110 MMOL/L (ref 98–107)
CO2: 25 MMOL/L (ref 22–29)
CO2: 25 MMOL/L (ref 22–29)
CREAT SERPL-MCNC: 0.8 MG/DL (ref 0.5–1)
CREAT SERPL-MCNC: 0.9 MG/DL (ref 0.5–1)
DESCRIPTION BLOOD BANK: NORMAL
EOSINOPHILS ABSOLUTE: 0.01 E9/L (ref 0.05–0.5)
EOSINOPHILS RELATIVE PERCENT: 0.1 % (ref 0–6)
GFR AFRICAN AMERICAN: >60
GFR AFRICAN AMERICAN: >60
GFR NON-AFRICAN AMERICAN: >60 ML/MIN/1.73
GFR NON-AFRICAN AMERICAN: >60 ML/MIN/1.73
GLUCOSE BLD-MCNC: 136 MG/DL (ref 74–109)
GLUCOSE BLD-MCNC: 159 MG/DL (ref 74–109)
HCT VFR BLD CALC: 29.1 % (ref 34–48)
HCT VFR BLD CALC: 32.8 % (ref 34–48)
HCT VFR BLD CALC: 33 % (ref 34–48)
HEMOGLOBIN: 11 G/DL (ref 11.5–15.5)
HEMOGLOBIN: 11 G/DL (ref 11.5–15.5)
HEMOGLOBIN: 9.8 G/DL (ref 11.5–15.5)
IMMATURE GRANULOCYTES #: 0.14 E9/L
IMMATURE GRANULOCYTES %: 0.9 % (ref 0–5)
LYMPHOCYTES ABSOLUTE: 1.19 E9/L (ref 1.5–4)
LYMPHOCYTES RELATIVE PERCENT: 7.4 % (ref 20–42)
MCH RBC QN AUTO: 29.6 PG (ref 26–35)
MCHC RBC AUTO-ENTMCNC: 33.5 % (ref 32–34.5)
MCV RBC AUTO: 88.2 FL (ref 80–99.9)
MONOCYTES ABSOLUTE: 1.03 E9/L (ref 0.1–0.95)
MONOCYTES RELATIVE PERCENT: 6.4 % (ref 2–12)
MRSA CULTURE ONLY: NORMAL
NEUTROPHILS ABSOLUTE: 13.71 E9/L (ref 1.8–7.3)
NEUTROPHILS RELATIVE PERCENT: 84.9 % (ref 43–80)
PDW BLD-RTO: 13.8 FL (ref 11.5–15)
PLATELET # BLD: 188 E9/L (ref 130–450)
PMV BLD AUTO: 9.5 FL (ref 7–12)
POTASSIUM SERPL-SCNC: 3.8 MMOL/L (ref 3.5–5)
POTASSIUM SERPL-SCNC: 3.8 MMOL/L (ref 3.5–5)
RBC # BLD: 3.72 E12/L (ref 3.5–5.5)
SODIUM BLD-SCNC: 141 MMOL/L (ref 132–146)
SODIUM BLD-SCNC: 144 MMOL/L (ref 132–146)
TOTAL PROTEIN: 5.1 G/DL (ref 6.4–8.3)
WBC # BLD: 16.1 E9/L (ref 4.5–11.5)

## 2018-06-27 PROCEDURE — 6360000002 HC RX W HCPCS: Performed by: INTERNAL MEDICINE

## 2018-06-27 PROCEDURE — 99222 1ST HOSP IP/OBS MODERATE 55: CPT | Performed by: SURGERY

## 2018-06-27 PROCEDURE — 6370000000 HC RX 637 (ALT 250 FOR IP): Performed by: INTERNAL MEDICINE

## 2018-06-27 PROCEDURE — 2780000010 HC IMPLANT OTHER: Performed by: INTERNAL MEDICINE

## 2018-06-27 PROCEDURE — 2580000003 HC RX 258: Performed by: NURSE ANESTHETIST, CERTIFIED REGISTERED

## 2018-06-27 PROCEDURE — 80053 COMPREHEN METABOLIC PANEL: CPT

## 2018-06-27 PROCEDURE — P9016 RBC LEUKOCYTES REDUCED: HCPCS

## 2018-06-27 PROCEDURE — 3609009900 HC COLONOSCOPY W/CONTROL BLEEDING ANY METHOD: Performed by: INTERNAL MEDICINE

## 2018-06-27 PROCEDURE — 0W3P8ZZ CONTROL BLEEDING IN GASTROINTESTINAL TRACT, VIA NATURAL OR ARTIFICIAL OPENING ENDOSCOPIC: ICD-10-PCS | Performed by: INTERNAL MEDICINE

## 2018-06-27 PROCEDURE — 78278 ACUTE GI BLOOD LOSS IMAGING: CPT

## 2018-06-27 PROCEDURE — 2580000003 HC RX 258: Performed by: INTERNAL MEDICINE

## 2018-06-27 PROCEDURE — 36415 COLL VENOUS BLD VENIPUNCTURE: CPT

## 2018-06-27 PROCEDURE — 80048 BASIC METABOLIC PNL TOTAL CA: CPT

## 2018-06-27 PROCEDURE — 74177 CT ABD & PELVIS W/CONTRAST: CPT

## 2018-06-27 PROCEDURE — 3700000001 HC ADD 15 MINUTES (ANESTHESIA): Performed by: INTERNAL MEDICINE

## 2018-06-27 PROCEDURE — A9560 TC99M LABELED RBC: HCPCS | Performed by: RADIOLOGY

## 2018-06-27 PROCEDURE — 85018 HEMOGLOBIN: CPT

## 2018-06-27 PROCEDURE — 36430 TRANSFUSION BLD/BLD COMPNT: CPT

## 2018-06-27 PROCEDURE — 3430000000 HC RX DIAGNOSTIC RADIOPHARMACEUTICAL: Performed by: RADIOLOGY

## 2018-06-27 PROCEDURE — C9113 INJ PANTOPRAZOLE SODIUM, VIA: HCPCS | Performed by: INTERNAL MEDICINE

## 2018-06-27 PROCEDURE — 86923 COMPATIBILITY TEST ELECTRIC: CPT

## 2018-06-27 PROCEDURE — 6360000002 HC RX W HCPCS: Performed by: NURSE ANESTHETIST, CERTIFIED REGISTERED

## 2018-06-27 PROCEDURE — 6360000004 HC RX CONTRAST MEDICATION: Performed by: RADIOLOGY

## 2018-06-27 PROCEDURE — 2000000000 HC ICU R&B

## 2018-06-27 PROCEDURE — 6360000002 HC RX W HCPCS

## 2018-06-27 PROCEDURE — C1773 RET DEV, INSERTABLE: HCPCS | Performed by: INTERNAL MEDICINE

## 2018-06-27 PROCEDURE — 2709999900 HC NON-CHARGEABLE SUPPLY: Performed by: INTERNAL MEDICINE

## 2018-06-27 PROCEDURE — 85014 HEMATOCRIT: CPT

## 2018-06-27 PROCEDURE — 3700000000 HC ANESTHESIA ATTENDED CARE: Performed by: INTERNAL MEDICINE

## 2018-06-27 PROCEDURE — 85025 COMPLETE CBC W/AUTO DIFF WBC: CPT

## 2018-06-27 DEVICE — CLIPPING DEVICE
Type: IMPLANTABLE DEVICE | Status: FUNCTIONAL
Brand: RESOLUTION CLIP

## 2018-06-27 RX ORDER — 0.9 % SODIUM CHLORIDE 0.9 %
INTRAVENOUS SOLUTION INTRAVENOUS CONTINUOUS PRN
Status: COMPLETED | OUTPATIENT
Start: 2018-06-27 | End: 2018-06-27

## 2018-06-27 RX ORDER — PROPOFOL 10 MG/ML
INJECTION, EMULSION INTRAVENOUS PRN
Status: DISCONTINUED | OUTPATIENT
Start: 2018-06-27 | End: 2018-06-27 | Stop reason: SDUPTHER

## 2018-06-27 RX ORDER — PROMETHAZINE HYDROCHLORIDE 25 MG/ML
12.5 INJECTION, SOLUTION INTRAMUSCULAR; INTRAVENOUS EVERY 6 HOURS PRN
Status: DISCONTINUED | OUTPATIENT
Start: 2018-06-27 | End: 2018-07-02

## 2018-06-27 RX ORDER — DEXTROSE AND SODIUM CHLORIDE 5; .9 G/100ML; G/100ML
INJECTION, SOLUTION INTRAVENOUS CONTINUOUS
Status: DISCONTINUED | OUTPATIENT
Start: 2018-06-27 | End: 2018-06-28

## 2018-06-27 RX ORDER — FLUCONAZOLE, SODIUM CHLORIDE 2 MG/ML
100 INJECTION INTRAVENOUS EVERY 24 HOURS
Status: DISCONTINUED | OUTPATIENT
Start: 2018-06-28 | End: 2018-06-30

## 2018-06-27 RX ORDER — ONDANSETRON 2 MG/ML
4 INJECTION INTRAMUSCULAR; INTRAVENOUS EVERY 6 HOURS PRN
Status: DISCONTINUED | OUTPATIENT
Start: 2018-06-27 | End: 2018-07-05 | Stop reason: HOSPADM

## 2018-06-27 RX ORDER — 0.9 % SODIUM CHLORIDE 0.9 %
10 VIAL (ML) INJECTION EVERY 12 HOURS SCHEDULED
Status: CANCELLED | OUTPATIENT
Start: 2018-06-27

## 2018-06-27 RX ORDER — ONDANSETRON 2 MG/ML
INJECTION INTRAMUSCULAR; INTRAVENOUS PRN
Status: DISCONTINUED | OUTPATIENT
Start: 2018-06-27 | End: 2018-06-27 | Stop reason: SDUPTHER

## 2018-06-27 RX ORDER — 0.9 % SODIUM CHLORIDE 0.9 %
250 INTRAVENOUS SOLUTION INTRAVENOUS ONCE
Status: COMPLETED | OUTPATIENT
Start: 2018-06-27 | End: 2018-06-28

## 2018-06-27 RX ORDER — EPINEPHRINE 1 MG/ML
INJECTION, SOLUTION, CONCENTRATE INTRAVENOUS PRN
Status: DISCONTINUED | OUTPATIENT
Start: 2018-06-27 | End: 2018-06-27 | Stop reason: HOSPADM

## 2018-06-27 RX ORDER — 0.9 % SODIUM CHLORIDE 0.9 %
10 VIAL (ML) INJECTION PRN
Status: CANCELLED | OUTPATIENT
Start: 2018-06-27

## 2018-06-27 RX ORDER — PROMETHAZINE HYDROCHLORIDE 25 MG/ML
INJECTION, SOLUTION INTRAMUSCULAR; INTRAVENOUS
Status: COMPLETED
Start: 2018-06-27 | End: 2018-06-27

## 2018-06-27 RX ORDER — FLUCONAZOLE 2 MG/ML
200 INJECTION, SOLUTION INTRAVENOUS ONCE
Status: COMPLETED | OUTPATIENT
Start: 2018-06-27 | End: 2018-06-27

## 2018-06-27 RX ORDER — SODIUM CHLORIDE 9 MG/ML
INJECTION, SOLUTION INTRAVENOUS CONTINUOUS PRN
Status: DISCONTINUED | OUTPATIENT
Start: 2018-06-27 | End: 2018-06-27 | Stop reason: SDUPTHER

## 2018-06-27 RX ADMIN — PROPOFOL 100 MG: 10 INJECTION, EMULSION INTRAVENOUS at 14:32

## 2018-06-27 RX ADMIN — FLUCONAZOLE 200 MG: 200 INJECTION, SOLUTION INTRAVENOUS at 18:54

## 2018-06-27 RX ADMIN — LEVOTHYROXINE SODIUM 50 MCG: 25 TABLET ORAL at 06:26

## 2018-06-27 RX ADMIN — SODIUM CHLORIDE: 9 INJECTION, SOLUTION INTRAVENOUS at 14:28

## 2018-06-27 RX ADMIN — PROMETHAZINE HYDROCHLORIDE 12.5 MG: 25 INJECTION INTRAMUSCULAR; INTRAVENOUS at 15:59

## 2018-06-27 RX ADMIN — PIPERACILLIN SODIUM AND TAZOBACTAM SODIUM 3.38 G: 3; .375 INJECTION, POWDER, LYOPHILIZED, FOR SOLUTION INTRAVENOUS at 06:25

## 2018-06-27 RX ADMIN — PROMETHAZINE HYDROCHLORIDE 12.5 MG: 25 INJECTION, SOLUTION INTRAMUSCULAR; INTRAVENOUS at 15:59

## 2018-06-27 RX ADMIN — PROPOFOL 100 MG: 10 INJECTION, EMULSION INTRAVENOUS at 14:37

## 2018-06-27 RX ADMIN — PROPOFOL 50 MG: 10 INJECTION, EMULSION INTRAVENOUS at 14:43

## 2018-06-27 RX ADMIN — Medication 10 ML: at 08:53

## 2018-06-27 RX ADMIN — PIPERACILLIN SODIUM AND TAZOBACTAM SODIUM 3.38 G: 3; .375 INJECTION, POWDER, LYOPHILIZED, FOR SOLUTION INTRAVENOUS at 15:52

## 2018-06-27 RX ADMIN — PROPOFOL 50 MG: 10 INJECTION, EMULSION INTRAVENOUS at 15:01

## 2018-06-27 RX ADMIN — SODIUM CHLORIDE 250 ML: 0.9 INJECTION, SOLUTION INTRAVENOUS at 00:10

## 2018-06-27 RX ADMIN — ONDANSETRON 4 MG: 2 INJECTION, SOLUTION INTRAMUSCULAR; INTRAVENOUS at 15:05

## 2018-06-27 RX ADMIN — METOPROLOL TARTRATE 25 MG: 25 TABLET ORAL at 08:53

## 2018-06-27 RX ADMIN — MAGESIUM CITRATE 296 ML: 1.75 LIQUID ORAL at 13:36

## 2018-06-27 RX ADMIN — DEXTROSE AND SODIUM CHLORIDE: 5; 900 INJECTION, SOLUTION INTRAVENOUS at 18:24

## 2018-06-27 RX ADMIN — DEXTROSE AND SODIUM CHLORIDE: 5; 450 INJECTION, SOLUTION INTRAVENOUS at 11:33

## 2018-06-27 RX ADMIN — PANTOPRAZOLE SODIUM 40 MG: 40 INJECTION, POWDER, FOR SOLUTION INTRAVENOUS at 08:52

## 2018-06-27 RX ADMIN — PROPOFOL 50 MG: 10 INJECTION, EMULSION INTRAVENOUS at 14:49

## 2018-06-27 RX ADMIN — Medication 25 MILLICURIE: at 07:36

## 2018-06-27 RX ADMIN — PROPOFOL 50 MG: 10 INJECTION, EMULSION INTRAVENOUS at 14:54

## 2018-06-27 RX ADMIN — IOPAMIDOL 80 ML: 755 INJECTION, SOLUTION INTRAVENOUS at 17:31

## 2018-06-27 RX ADMIN — SODIUM CHLORIDE 250 ML: 9 INJECTION, SOLUTION INTRAVENOUS at 12:29

## 2018-06-27 RX ADMIN — METOPROLOL TARTRATE 25 MG: 25 TABLET ORAL at 21:40

## 2018-06-27 ASSESSMENT — PAIN DESCRIPTION - ONSET
ONSET: ON-GOING
ONSET: ON-GOING

## 2018-06-27 ASSESSMENT — PAIN DESCRIPTION - DESCRIPTORS
DESCRIPTORS: CRAMPING
DESCRIPTORS: CRAMPING

## 2018-06-27 ASSESSMENT — PAIN DESCRIPTION - PAIN TYPE
TYPE: ACUTE PAIN
TYPE: ACUTE PAIN

## 2018-06-27 ASSESSMENT — PAIN DESCRIPTION - LOCATION
LOCATION: ABDOMEN
LOCATION: ABDOMEN

## 2018-06-27 ASSESSMENT — PAIN DESCRIPTION - ORIENTATION: ORIENTATION: MID

## 2018-06-27 ASSESSMENT — PAIN SCALES - GENERAL
PAINLEVEL_OUTOF10: 2
PAINLEVEL_OUTOF10: 3

## 2018-06-27 ASSESSMENT — PAIN DESCRIPTION - PROGRESSION
CLINICAL_PROGRESSION: NOT CHANGED

## 2018-06-27 ASSESSMENT — PAIN DESCRIPTION - FREQUENCY
FREQUENCY: INTERMITTENT
FREQUENCY: INTERMITTENT

## 2018-06-27 NOTE — PROGRESS NOTES
06/27/2018    LABGLOM >60 06/27/2018    GLUCOSE 136 06/27/2018    GLUCOSE 87 03/22/2012    PROT 5.5 06/26/2018    LABALBU 2.9 06/26/2018    LABALBU 4.2 03/22/2012    CALCIUM 8.5 06/27/2018    BILITOT 0.2 06/26/2018    ALKPHOS 66 06/26/2018    AST 16 06/26/2018    ALT 18 06/26/2018     PT/INR:    Lab Results   Component Value Date    PROTIME 11.3 06/26/2018    INR 1.0 06/26/2018         CT ABDOMEN PELVIS W IV CONTRAST Additional Contrast? None   Final Result   1. No evidence of urinary or bowel obstruction. 2. Abnormal appearance the distal colon with diverticula, mild mural   thickening and mild pericolonic stranding. Small nodules are noted   within the mesentery which could be focally enlarged lymph nodes range   up to about 12 mm in diameter. Differential includes low-grade   diverticulitis or malignancy. Final report differs from that generated by Benny Verduzco. These findings were   communicated to radiology department personnel by critical results   form for further communication to the requesting physician at the   conclusion of this examination.          NM GI BLOOD LOSS    (Results Pending)       Other Data:      Intake/Output Summary (Last 24 hours) at 06/27/18 0914  Last data filed at 06/27/18 4958   Gross per 24 hour   Intake             3937 ml   Output              600 ml   Net             3337 ml         Scheduled Medications:   metoprolol tartrate  25 mg Oral BID    levothyroxine  50 mcg Oral Daily    pantoprazole  40 mg Intravenous Daily    And    sodium chloride (PF)  10 mL Intravenous Daily    piperacillin-tazobactam  3.375 g Intravenous Q8H    sodium chloride (PF)  10 mL Intravenous 2 times per day    sodium chloride  250 mL Intravenous Once         Infusion Medications:   dextrose 5 % and 0.45 % NaCl 80 mL/hr at 06/27/18 0800       Assessment:   Patient Active Problem List    Diagnosis Date Noted    GI bleed 06/26/2018    Diverticulitis of intestine with bleeding 06/26/2018    Anemia

## 2018-06-27 NOTE — PROGRESS NOTES
PROGRESS NOTE    By Shelley Carmen D.O., GI Fellow    [facility] Humza Ritter M.D., Dr. Ford Vigil M.D., Dr. Jaswinder Estrella., Dr. Mariah Rojas M.D., Dr. Janis Carbajal  68 y.o.  female    SUBJECTIVE:  NM bleeding scan +, patient received 2 units last night for a total of 4 units. Increased BRB and clots over past 24hr. OBJECTIVE:  /70   Pulse 76   Temp 98.7 °F (37.1 °C) (Oral)   Resp (!) 31   Ht 5' 4\" (1.626 m)   Wt 242 lb 8 oz (110 kg)   SpO2 98%   BMI 41.63 kg/m²   GEN: A&Ox3, friendly, NAD  HEENT:PEERL, no icterus  Heart: RRR  Lungs: CTAB  Abd.: soft, NT, ND, BS +, no G/R, no HSM  Extr.: no C/C/E, no brusing       Lab Results   Component Value Date    WBC 15.0 06/26/2018    WBC 12.4 06/26/2018    WBC 9.9 05/23/2018    HGB 11.0 06/27/2018    HGB 8.6 06/26/2018    HGB 10.3 06/26/2018    HCT 33.0 06/27/2018    MCV 88.3 06/26/2018    RDW 13.4 06/26/2018     06/26/2018     06/26/2018     05/23/2018     Lab Results   Component Value Date     06/27/2018    K 3.8 06/27/2018     06/27/2018    CO2 25 06/27/2018    BUN 10 06/27/2018    CREATININE 0.8 06/27/2018    CALCIUM 8.5 06/27/2018    PROT 5.5 06/26/2018    LABALBU 2.9 06/26/2018    LABALBU 4.2 03/22/2012    BILITOT 0.2 06/26/2018    BILITOT <0.2 06/26/2018    BILITOT 0.4 05/23/2018    ALKPHOS 66 06/26/2018    ALKPHOS 78 06/26/2018    ALKPHOS 89 05/23/2018    AST 16 06/26/2018    AST 18 06/26/2018    AST 28 05/23/2018    ALT 18 06/26/2018    ALT 22 06/26/2018    ALT 24 05/23/2018     No results found for: LIPASE  No results found for: AMYLASE      ASSESSMENT/PLAN:  1. Acute blood loss anemia, Hematochezia:  CT revealing distal diverticula, mild mural thickening and pericolonic stranding. Small nodules in mesentery ~1.2cm.  No free air noted on exam. No peritoneal signs.    -rapid purge prep colonoscopy was performed yesterday, clots and old blood observed, no

## 2018-06-27 NOTE — CONSULTS
Social History     Social History    Marital status: Single     Spouse name: N/A    Number of children: N/A    Years of education: N/A     Occupational History    Not on file. Social History Main Topics    Smoking status: Never Smoker    Smokeless tobacco: Never Used    Alcohol use Yes      Comment: social    Drug use: No    Sexual activity: Not on file     Other Topics Concern    Not on file     Social History Narrative    No narrative on file       The patient has a family history that is negative for severe cardiovascular or respiratory issues, negative for reaction to anesthesia. Recent Labs      06/26/18   0040  06/26/18   0525   06/26/18   2330  06/27/18   0600  06/27/18   1151   WBC  12.4*  15.0*   --    --    --    --    HGB  12.1  10.4*   < >  8.6*  11.0*  9.8*   HCT  37.3  31.8*   < >  25.3*  33.0*  29.1*   MCV  87.8  88.3   --    --    --    --    PLT  281  286   --    --    --    --     < > = values in this interval not displayed.        Recent Labs      06/26/18   0040  06/26/18   0045  06/26/18   0600  06/27/18   0600   NA  141   --   140  144   K  4.2   --   4.2  3.8   CO2  25   --   25  25   BUN  27*   --   24*  10   CREATININE  1.3*  1.3  1.0  0.8       Recent Labs      06/26/18   0040  06/26/18   0600   PROT  6.4  5.5*   INR  1.0   --          Intake/Output Summary (Last 24 hours) at 06/27/18 1232  Last data filed at 06/27/18 6246   Gross per 24 hour   Intake             3937 ml   Output              600 ml   Net             3337 ml       Review of Systems  General ROS: negative for - chills, fatigue or malaise  ENT ROS: negative for - nasal congestion or nasal discharge  Allergy and Immunology ROS: negative for - hives, itchy/watery eyes or nasal congestion  Hematological and Lymphatic ROS: negative for - bruising or fatigue  Endocrine ROS: negative for - malaise/lethargy, mood swings, palpitations or polydipsia/polyuria  Respiratory ROS: negative for - sputum changes, stridor, tachypnea or wheezing  Cardiovascular ROS: negative for - irregular heartbeat, loss of consciousness, murmur or orthopnea  Gastrointestinal ROS: negative for - heartburn or hematemesis  Genito-Urinary ROS: negative for -  genital discharge, genital ulcers or hematuria  Musculoskeletal ROS: negative for - gait disturbance, muscle pain or muscular weakness  Psych/soc ROS: Neg for suicidal ideation, visual/auditory hallucinations      Physical exam:   /72   Pulse 71   Temp 98.3 °F (36.8 °C) (Oral)   Resp 23   Ht 5' 4\" (1.626 m)   Wt 242 lb 8 oz (110 kg)   SpO2 97%   BMI 41.63 kg/m²   General appearance:  NAD  Head: NCAT, PERRLA, EOMI, red conjunctiva  Neck: supple, no masses  Lungs: Equal chest rise bilateral  Heart: Reg rate  Abdomen: soft, nontender, nondistended  Skin; no lesions  Gu: no cva tenderness  Extremities: extremities normal, atraumatic, no cyanosis or edema  Psych: No tremor, visual hallucinations    Radiology:  CT abd/pel:  Impression   1. No evidence of urinary or bowel obstruction. 2. Abnormal appearance the distal colon with diverticula, mild mural   thickening and mild pericolonic stranding. Small nodules are noted   within the mesentery which could be focally enlarged lymph nodes range   up to about 12 mm in diameter. Differential includes low-grade   diverticulitis or malignancy.       Final report differs from that generated by [address] [place] [address]. These findings were   communicated to radiology department personnel by critical results   form for further communication to the requesting physician at the   conclusion of this examination. NM bleeding scan:   Impression   Active gastrointestinal hemorrhage proximal colon.          Assessment:  Rocky Hoffmann is a 68 y.o. female with GI hemorrhage, colon bleeding, s/p 4 units PRBC    Patient Active Problem List   Diagnosis    Essential hypertension    Acquired hypothyroidism    Class 2 obesity in adult    Osteoarthritis of multiple joints    GI bleed    Diverticulitis of intestine with bleeding    Anemia due to blood loss, acute    Essential hypertension    Acquired hypothyroidism    Osteoarthritis of multiple joints       Plan:  Repeat colonoscopy recommended and planned with GI  Discussed subtotal colectomy if bleeding persists, hemodynamic instability given her diffuse disease and recurrent diverticular bleeding  Will follow  Monitor HH    Time spent reviewing past medical, surgical, social and family history, vitals, nursing assessment and images. Time spent face to face with patient and family counciling and discussing care exceeded 50% of the time of the consult. Additional time spent reviewing images and labs, discussing case with nursing, support staff and other physicians; as well as coordinating care.         Physician Signature: Electronically signed by Dr. Bety Olivia  [phone] (p)

## 2018-06-27 NOTE — PROCEDURES
Colonoscopy note    Procedure: Colonoscopy with Epinephrine 1:10,000 dilution injection, Endoclip x3 and SPOT Marker injection    Indication: Recurrent LGIB with + bleeding scan R colon      Sedation: MAC      Endoscope was advanced through anus to cecum and terminal ileum     Blood throughout colon. Patient tolerated procedure well. No apparent post- procedure complications. Terminal ileum is normal  Cecum is normal  Ascending colon is normal  Mid- Hepatic Flexure had fresh blood coming from a diverticula. Epinephrine 8 cc injected, Endoclip x 3 placed and area SPOT injected  Transverse colon is normal  Splenic Flexure is normal  Descending colon is normal  Sigmoid colon has DDX  Rectum direct views are normal  Retroflexion in rectum shows internal hemorrhoids    IMPRESSION AND PLAN:    Mid- Hepatic Flexure had fresh blood coming from a diverticula. Epinephrine 8 cc injected, Endoclip x 3 placed with cessation of bleeding, and area SPOT injected. Observe for now. Surgery R hemicolectomy if rebleeds. Follow up as outpatient in office , call [phone] to schedule for appointment if needed.

## 2018-06-27 NOTE — PLAN OF CARE
Problem: Falls - Risk of:  Goal: Will remain free from falls  Will remain free from falls   Outcome: Met This Shift    Goal: Absence of physical injury  Absence of physical injury   Outcome: Met This Shift      Problem: Fluid Volume - Imbalance:  Goal: Absence of imbalanced fluid volume signs and symptoms  Absence of imbalanced fluid volume signs and symptoms   Outcome: Not Met This Shift

## 2018-06-28 ENCOUNTER — APPOINTMENT (OUTPATIENT)
Dept: INTERVENTIONAL RADIOLOGY/VASCULAR | Age: 73
DRG: 330 | End: 2018-06-28
Payer: COMMERCIAL

## 2018-06-28 PROBLEM — K57.80 DIVERTICULAR DISEASE OF INTESTINE WITH PERFORATION AND ABSCESS: Status: ACTIVE | Noted: 2018-06-27

## 2018-06-28 LAB
ALBUMIN SERPL-MCNC: 2.4 G/DL (ref 3.5–5.2)
ALP BLD-CCNC: 47 U/L (ref 35–104)
ALT SERPL-CCNC: 13 U/L (ref 0–32)
ANION GAP SERPL CALCULATED.3IONS-SCNC: 4 MMOL/L (ref 7–16)
AST SERPL-CCNC: 14 U/L (ref 0–31)
BASOPHILS ABSOLUTE: 0.04 E9/L (ref 0–0.2)
BASOPHILS RELATIVE PERCENT: 0.3 % (ref 0–2)
BILIRUB SERPL-MCNC: 0.4 MG/DL (ref 0–1.2)
BUN BLDV-MCNC: 8 MG/DL (ref 8–23)
CALCIUM SERPL-MCNC: 7.9 MG/DL (ref 8.6–10.2)
CHLORIDE BLD-SCNC: 113 MMOL/L (ref 98–107)
CO2: 26 MMOL/L (ref 22–29)
CREAT SERPL-MCNC: 0.9 MG/DL (ref 0.5–1)
EOSINOPHILS ABSOLUTE: 0.17 E9/L (ref 0.05–0.5)
EOSINOPHILS RELATIVE PERCENT: 1.3 % (ref 0–6)
GFR AFRICAN AMERICAN: >60
GFR NON-AFRICAN AMERICAN: >60 ML/MIN/1.73
GLUCOSE BLD-MCNC: 135 MG/DL (ref 74–109)
HCT VFR BLD CALC: 25.9 % (ref 34–48)
HCT VFR BLD CALC: 26.2 % (ref 34–48)
HCT VFR BLD CALC: 26.4 % (ref 34–48)
HEMOGLOBIN: 8.7 G/DL (ref 11.5–15.5)
HEMOGLOBIN: 8.7 G/DL (ref 11.5–15.5)
HEMOGLOBIN: 9 G/DL (ref 11.5–15.5)
IMMATURE GRANULOCYTES #: 0.1 E9/L
IMMATURE GRANULOCYTES %: 0.7 % (ref 0–5)
INR BLD: 1.1
LYMPHOCYTES ABSOLUTE: 2.08 E9/L (ref 1.5–4)
LYMPHOCYTES RELATIVE PERCENT: 15.5 % (ref 20–42)
MCH RBC QN AUTO: 30 PG (ref 26–35)
MCHC RBC AUTO-ENTMCNC: 34.1 % (ref 32–34.5)
MCV RBC AUTO: 88 FL (ref 80–99.9)
MONOCYTES ABSOLUTE: 1.38 E9/L (ref 0.1–0.95)
MONOCYTES RELATIVE PERCENT: 10.3 % (ref 2–12)
NEUTROPHILS ABSOLUTE: 9.64 E9/L (ref 1.8–7.3)
NEUTROPHILS RELATIVE PERCENT: 71.9 % (ref 43–80)
PDW BLD-RTO: 14.3 FL (ref 11.5–15)
PLATELET # BLD: 175 E9/L (ref 130–450)
PMV BLD AUTO: 9.4 FL (ref 7–12)
POTASSIUM SERPL-SCNC: 3.6 MMOL/L (ref 3.5–5)
PROTHROMBIN TIME: 12.7 SEC (ref 9.3–12.4)
RBC # BLD: 3 E12/L (ref 3.5–5.5)
SODIUM BLD-SCNC: 143 MMOL/L (ref 132–146)
TOTAL PROTEIN: 4.4 G/DL (ref 6.4–8.3)
TRIGL SERPL-MCNC: 154 MG/DL (ref 0–149)
WBC # BLD: 13.4 E9/L (ref 4.5–11.5)

## 2018-06-28 PROCEDURE — 85018 HEMOGLOBIN: CPT

## 2018-06-28 PROCEDURE — 2500000003 HC RX 250 WO HCPCS: Performed by: INTERNAL MEDICINE

## 2018-06-28 PROCEDURE — 2000000000 HC ICU R&B

## 2018-06-28 PROCEDURE — C9113 INJ PANTOPRAZOLE SODIUM, VIA: HCPCS | Performed by: INTERNAL MEDICINE

## 2018-06-28 PROCEDURE — C1751 CATH, INF, PER/CENT/MIDLINE: HCPCS

## 2018-06-28 PROCEDURE — 36415 COLL VENOUS BLD VENIPUNCTURE: CPT

## 2018-06-28 PROCEDURE — 85014 HEMATOCRIT: CPT

## 2018-06-28 PROCEDURE — 85610 PROTHROMBIN TIME: CPT

## 2018-06-28 PROCEDURE — 02HV33Z INSERTION OF INFUSION DEVICE INTO SUPERIOR VENA CAVA, PERCUTANEOUS APPROACH: ICD-10-PCS | Performed by: RADIOLOGY

## 2018-06-28 PROCEDURE — 36569 INSJ PICC 5 YR+ W/O IMAGING: CPT | Performed by: RADIOLOGY

## 2018-06-28 PROCEDURE — 77001 FLUOROGUIDE FOR VEIN DEVICE: CPT | Performed by: RADIOLOGY

## 2018-06-28 PROCEDURE — 99232 SBSQ HOSP IP/OBS MODERATE 35: CPT | Performed by: SURGERY

## 2018-06-28 PROCEDURE — 84478 ASSAY OF TRIGLYCERIDES: CPT

## 2018-06-28 PROCEDURE — 6360000002 HC RX W HCPCS: Performed by: INTERNAL MEDICINE

## 2018-06-28 PROCEDURE — 2580000003 HC RX 258: Performed by: INTERNAL MEDICINE

## 2018-06-28 PROCEDURE — 76937 US GUIDE VASCULAR ACCESS: CPT | Performed by: RADIOLOGY

## 2018-06-28 PROCEDURE — 85025 COMPLETE CBC W/AUTO DIFF WBC: CPT

## 2018-06-28 PROCEDURE — 80053 COMPREHEN METABOLIC PANEL: CPT

## 2018-06-28 PROCEDURE — 6370000000 HC RX 637 (ALT 250 FOR IP): Performed by: INTERNAL MEDICINE

## 2018-06-28 PROCEDURE — 36592 COLLECT BLOOD FROM PICC: CPT

## 2018-06-28 RX ORDER — HEPARIN SODIUM (PORCINE) LOCK FLUSH IV SOLN 100 UNIT/ML 100 UNIT/ML
100 SOLUTION INTRAVENOUS PRN
Status: DISCONTINUED | OUTPATIENT
Start: 2018-06-28 | End: 2018-07-05 | Stop reason: HOSPADM

## 2018-06-28 RX ADMIN — Medication 10 ML: at 23:41

## 2018-06-28 RX ADMIN — DEXTROSE AND SODIUM CHLORIDE: 5; 900 INJECTION, SOLUTION INTRAVENOUS at 07:39

## 2018-06-28 RX ADMIN — PANTOPRAZOLE SODIUM 40 MG: 40 INJECTION, POWDER, FOR SOLUTION INTRAVENOUS at 09:57

## 2018-06-28 RX ADMIN — Medication 10 ML: at 17:23

## 2018-06-28 RX ADMIN — Medication 10 ML: at 09:57

## 2018-06-28 RX ADMIN — METOPROLOL TARTRATE 25 MG: 25 TABLET ORAL at 09:56

## 2018-06-28 RX ADMIN — POTASSIUM CHLORIDE: 2 INJECTION, SOLUTION, CONCENTRATE INTRAVENOUS at 17:23

## 2018-06-28 RX ADMIN — LEVOTHYROXINE SODIUM 50 MCG: 25 TABLET ORAL at 06:36

## 2018-06-28 RX ADMIN — METOPROLOL TARTRATE 25 MG: 25 TABLET ORAL at 20:35

## 2018-06-28 RX ADMIN — PIPERACILLIN SODIUM AND TAZOBACTAM SODIUM 3.38 G: 3; .375 INJECTION, POWDER, LYOPHILIZED, FOR SOLUTION INTRAVENOUS at 07:37

## 2018-06-28 RX ADMIN — PIPERACILLIN SODIUM AND TAZOBACTAM SODIUM 3.38 G: 3; .375 INJECTION, POWDER, LYOPHILIZED, FOR SOLUTION INTRAVENOUS at 23:40

## 2018-06-28 RX ADMIN — PIPERACILLIN SODIUM AND TAZOBACTAM SODIUM 3.38 G: 3; .375 INJECTION, POWDER, LYOPHILIZED, FOR SOLUTION INTRAVENOUS at 15:10

## 2018-06-28 RX ADMIN — Medication 10 ML: at 20:35

## 2018-06-28 RX ADMIN — PIPERACILLIN SODIUM AND TAZOBACTAM SODIUM 3.38 G: 3; .375 INJECTION, POWDER, LYOPHILIZED, FOR SOLUTION INTRAVENOUS at 00:22

## 2018-06-28 RX ADMIN — FLUCONAZOLE 100 MG: 2 INJECTION INTRAVENOUS at 14:13

## 2018-06-28 RX ADMIN — Medication 10 ML: at 19:58

## 2018-06-28 ASSESSMENT — PAIN SCALES - GENERAL
PAINLEVEL_OUTOF10: 0

## 2018-06-28 NOTE — PLAN OF CARE
Problem: Falls - Risk of:  Goal: Will remain free from falls  Will remain free from falls   Outcome: Met This Shift      Problem:  Bowel Function - Altered:  Goal: Bowel elimination is within specified parameters  Bowel elimination is within specified parameters   Outcome: Not Met This Shift

## 2018-06-28 NOTE — PROGRESS NOTES
had bleeding on right and left so need to decide what to remove.    Monitor for further signs of bleeding  No surgery planned this admission    Physician Signature: Electronically signed by Dr. Tomlinson Profit  [phone] (p)  6/28/2018  4:22 PM

## 2018-06-28 NOTE — CONSULTS
[address] [place] [address] [facility]  Consult Note    [address] [address]  [place], [address]  Phone [phone]   Fax [phone] Date: 6/25/2018 11:53 PM  Pt Name: Luis Antonio Archuleta  MRN: [id number]  1945      Reason for Consult:    Chief Complaint   Patient presents with    Rectal Bleeding     Requesting Physician:  Krysta Gillis MD  PCP: Krysta Gillis MD    History Obtained From:  patient  ID consulted for atbx on hospital day 2     CHIEF COMPLAINT       Chief Complaint   Patient presents with    Rectal Bleeding       HISTORY OF PRESENT ILLNESS      Zena Almendarez is a 68 y.o. female who presents with significant past medical history of  has a past medical history of Arthritis; History of cardiovascular stress test; Hypertension; Sciatica; Spinal headache; Tachycardia; and Thyroid disease. who presents with   Chief Complaint   Patient presents with    Rectal Bleeding       ED TRIAGE VITALS  BP: 135/79, Temp: 98.4 °F (36.9 °C), Pulse: 67, Resp: 22, SpO2: 99 %     HPI    72yo to female presented with GIB-rectal bleeding and abd pain assoc with cramping. She does have a history of rectal bleeding which was secondary to diverticulitis. She denies any fevers, chills, chest pain, shortness of breath, dysuria or hematuria. Wbc12.4 cr1.3  CT a/p Left colonic diverticulosis but no CT evidence for diverticulitis. 6/26 s/p RRT related to syncopal episode  S/p Cscope   Surgery consulted poss subtotal colectomy if bleeding persists, hemodynamic instability given her diffuse disease and recurrent diverticular bleeding  6/27 s/p cscope- Mid- Hepatic Flexure had fresh blood coming from a diverticula. Epinephrine 8 cc injected, Endoclip x 3 placed and area SPOT injected. Following colonoscopy patient began having nausea with vomiting, and extreme pain in lower mid abdomen. CT a/p 1.  New surgical clips within the right colon with a few tiny adjacent  foci of air, possibly SURGERY      OTHER SURGICAL HISTORY Right 05/09/2017    right TKA    TUBAL LIGATION         FAMILY HISTORY       Family History   Problem Relation Age of Onset    Cancer Mother     Cancer Father         SOCIAL HISTORY       Social History     Social History    Marital status: Single     Spouse name: N/A    Number of children: N/A    Years of education: N/A     Social History Main Topics    Smoking status: Never Smoker    Smokeless tobacco: Never Used    Alcohol use Yes      Comment: social    Drug use: No    Sexual activity: Not Asked     Other Topics Concern    None     Social History Narrative    None        ·    · Occupation:  Dietary manager    PHYSICAL EXAM    (up to 7 for level 4, 8 or more for level 5)     ED Triage Vitals [06/25/18 2357]   BP Temp Temp Source Pulse Resp SpO2 Height Weight   103/88 97.6 °F (36.4 °C) Oral 94 20 98 % 5' 4\" (1.626 m) 245 lb 3.2 oz (111.2 kg)     Vitals:    Vitals:    06/28/18 0300 06/28/18 0400 06/28/18 0500 06/28/18 0600   BP: (!) 126/55 134/60 (!) 149/69 135/79   Pulse: 75 69 73 67   Resp: 28 (!) 33 (!) 32 22   Temp:  98.4 °F (36.9 °C)     TempSrc:  Oral     SpO2: 99% 93% 96% 99%   Weight:    245 lb 4.8 oz (111.3 kg)   Height:         Physical Exam   Constitutional/General: Alert and oriented x3   Head: NC/AT  Eyes: PERRL, EOMI  Mouth: Normal mucosa, no thrush   Neck: Supple, full ROM,    Pulmonary: Lungs clear to auscultation bilaterally, no wheezes, rales, or rhonchi. Not in respiratory distress  Cardiovascular:  Regular rate and rhythm, no murmurs, gallops, or rubs. Abdomen: Soft, + BS. No distension. Nontender. No palpable rigidity, rebound or guarding  Extremities: Moves all extremities x 4.    Warm and well perfused  Pulses:  Distal pulses inatct  Skin: Warm and dry without rash  Neurologic:    No focal deficits  Psych: Normal Affect             DIAGNOSTIC RESULTS       RADIOLOGY:   Nm Gi Blood Loss    Addendum Date: 6/27/2018    Addendum: Dr. Damien Hernandez Bilateral adrenal glands are normal in appearance. Bilateral kidneys are normal in morphology and demonstrate symmetric enhancement. There is a fluid attenuation cyst at the superior pole of the left kidney. There is no hydronephrosis or hydroureter bilaterally. Urinary bladder is grossly unremarkable. The uterus is normal in appearance. The abdominal aorta is normal in caliber and enhancement. There is no suspicious lytic or blastic osseous lesion. There is lower lumbar spondylosis. 1. New surgical clips within the right colon with a few tiny adjacent foci of air, possibly related to microperforation. 2. Scattered colonic diverticula without evidence of acute diverticulitis. 3. Diffuse fatty infiltration of the liver. Findings were discussed with Dr. Neris Messer on 6/27/2018 at 5:45 PM.    Ct Abdomen Pelvis W Iv Contrast Additional Contrast? None    Result Date: 6/26/2018  Reading location: [facility] INDICATION: Abdominal pain, rectal bleeding FINDINGS: Axial CT images through the abdomen and pelvis after intravenous injection 80 mL Isovue-[id number]. No oral contrast which limits evaluation of bowel and adjacent structures particularly with regards to patient complains. Automated dose control. . Normal-sized heart. No focal consolidation visualized lung bases. Diffuse low-attenuation liver parenchyma probably fatty infiltration. Oval 12 mm low-attenuation structure upper pole left kidney could be a cyst. Sonogram would confirm. 7 mm calculus centrally in the left kidney. No other focal or malleable abdominal solid organs. Bowel and bile ducts and urinary tract appear normal caliber. Multiple colonic diverticula. Mild stranding in the mesentery adjacent to the distal descending and sigmoid segments of the colon. Associated mural thickening. Small nodules within the mesentery ranging up to about 12 mm in diameter. No extraluminal air or fluid collection. Unremarkable arterial enhancement. Scattered atherosclerosis. Multilevel spondylosis. Grade 1 L5-S1 spondylolisthesis. 1. No evidence of urinary or bowel obstruction. 2. Abnormal appearance the distal colon with diverticula, mild mural thickening and mild pericolonic stranding. Small nodules are noted within the mesentery which could be focally enlarged lymph nodes range up to about 12 mm in diameter. Differential includes low-grade diverticulitis or malignancy. Final report differs from that generated by [facility]. These findings were communicated to radiology department personnel by critical results form for further communication to the requesting physician at the conclusion of this examination. LABS:    Last 3 CBC:  Recent Labs      06/26/18   0525   06/27/18   1151  06/27/18 1945 06/28/18   0625   WBC  15.0*   --    --   16.1*  13.4*   RBC  3.60   --    --   3.72  3.00*   HGB  10.4*   < >  9.8*  11.0*  9.0*   HCT  31.8*   < >  29.1*  32.8*  26.4*   MCV  88.3   --    --   88.2  88.0   MCH  28.9   --    --   29.6  30.0   MCHC  32.7   --    --   33.5  34.1   RDW  13.4   --    --   13.8  14.3   PLT  286   --    --   188  175   MPV  10.4   --    --   9.5  9.4    < > = values in this interval not displayed.      Last 3 CMP:    Recent Labs      06/26/18   0600  06/27/18   0600  06/27/18 1945 06/28/18   0625   NA  140  144  141  143   K  4.2  3.8  3.8  3.6   CL  108*  110*  105  113*   CO2  25  25  25  26   BUN  24*  10  9  8   CREATININE  1.0  0.8  0.9  0.9   GLUCOSE  169*  136*  159*  135*   CALCIUM  8.6  8.5*  8.0*  7.9*   PROT  5.5*   --   5.1*  4.4*   LABALBU  2.9*   --   2.8*  2.4*   BILITOT  0.2   --   0.4  0.4   ALKPHOS  66   --   56  47   AST  16   --   18  14   ALT  18   --   16  13          Results for orders placed or performed during the hospital encounter of 06/25/18   MRSA SCREENING CULTURE ONLY   Result Value Ref Range    MRSA Culture Only Methicillin resistant Staph aureus not isolated    CBC auto differential   Result Value Ref Range    WBC 12.4 (H) 4.5 - 11.5 E9/L    RBC 4.25 3.50 - 5.50 E12/L    Hemoglobin 12.1 11.5 - 15.5 g/dL    Hematocrit 37.3 34.0 - 48.0 %    MCV 87.8 80.0 - 99.9 fL    MCH 28.5 26.0 - 35.0 pg    MCHC 32.4 32.0 - 34.5 %    RDW 13.1 11.5 - 15.0 fL    Platelets 024 167 - 077 E9/L    MPV 9.4 7.0 - 12.0 fL    Neutrophils % 74.4 43.0 - 80.0 %    Immature Granulocytes % 0.5 0.0 - 5.0 %    Lymphocytes % 14.6 (L) 20.0 - 42.0 %    Monocytes % 9.1 2.0 - 12.0 %    Eosinophils % 1.1 0.0 - 6.0 %    Basophils % 0.3 0.0 - 2.0 %    Neutrophils # 9.20 (H) 1.80 - 7.30 E9/L    Immature Granulocytes # 0.06 E9/L    Lymphocytes # 1.81 1.50 - 4.00 E9/L    Monocytes # 1.13 (H) 0.10 - 0.95 E9/L    Eosinophils # 0.14 0.05 - 0.50 E9/L    Basophils # 0.04 0.00 - 0.20 E9/L   Comprehensive Metabolic Panel   Result Value Ref Range    Sodium 141 132 - 146 mmol/L    Potassium 4.2 3.5 - 5.0 mmol/L    Chloride 104 98 - 107 mmol/L    CO2 25 22 - 29 mmol/L    Anion Gap 12 7 - 16 mmol/L    Glucose 182 (H) 74 - 109 mg/dL    BUN 27 (H) 8 - 23 mg/dL    CREATININE 1.3 (H) 0.5 - 1.0 mg/dL    GFR Non-African American 40 >=60 mL/min/1.73    GFR African American 49     Calcium 9.1 8.6 - 10.2 mg/dL    Total Protein 6.4 6.4 - 8.3 g/dL    Alb 3.3 (L) 3.5 - 5.2 g/dL    Total Bilirubin <0.2 0.0 - 1.2 mg/dL    Alkaline Phosphatase 78 35 - 104 U/L    ALT 22 0 - 32 U/L    AST 18 0 - 31 U/L   Lactic Acid, Plasma   Result Value Ref Range    Lactic Acid 1.6 0.5 - 2.2 mmol/L   Urinalysis   Result Value Ref Range    Color, UA Yellow Straw/Yellow    Clarity, UA Clear Clear    Glucose, Ur Negative Negative mg/dL    Bilirubin Urine Negative Negative    Ketones, Urine Negative Negative mg/dL    Specific Gravity, UA 1.015 1.005 - 1.030    Blood, Urine MODERATE (A) Negative    pH, UA 5.0 5.0 - 9.0    Protein, UA Negative Negative mg/dL    Urobilinogen, Urine 0.2 <2.0 E.U./dL    Nitrite, Urine Negative Negative    Leukocyte Esterase, Urine Negative Negative   Troponin   Result Value Ref Range    Troponin 9.1 (L) 11.5 - 15.5 g/dL    Hematocrit 27.9 (L) 34.0 - 48.0 %   HEMOGLOBIN AND HEMATOCRIT, BLOOD   Result Value Ref Range    Hemoglobin 8.6 (L) 11.5 - 15.5 g/dL    Hematocrit 25.3 (L) 34.0 - 48.0 %   HEMOGLOBIN AND HEMATOCRIT, BLOOD   Result Value Ref Range    Hemoglobin 11.0 (L) 11.5 - 15.5 g/dL    Hematocrit 33.0 (L) 34.0 - 48.0 %   Basic Metabolic Panel   Result Value Ref Range    Sodium 144 132 - 146 mmol/L    Potassium 3.8 3.5 - 5.0 mmol/L    Chloride 110 (H) 98 - 107 mmol/L    CO2 25 22 - 29 mmol/L    Anion Gap 9 7 - 16 mmol/L    Glucose 136 (H) 74 - 109 mg/dL    BUN 10 8 - 23 mg/dL    CREATININE 0.8 0.5 - 1.0 mg/dL    GFR Non-African American >60 >=60 mL/min/1.73    GFR African American >60     Calcium 8.5 (L) 8.6 - 10.2 mg/dL   HEMOGLOBIN AND HEMATOCRIT, BLOOD   Result Value Ref Range    Hemoglobin 9.8 (L) 11.5 - 15.5 g/dL    Hematocrit 29.1 (L) 34.0 - 48.0 %   Protime-INR   Result Value Ref Range    Protime 12.7 (H) 9.3 - 12.4 sec    INR 1.1    CBC WITH AUTO DIFFERENTIAL   Result Value Ref Range    WBC 16.1 (H) 4.5 - 11.5 E9/L    RBC 3.72 3.50 - 5.50 E12/L    Hemoglobin 11.0 (L) 11.5 - 15.5 g/dL    Hematocrit 32.8 (L) 34.0 - 48.0 %    MCV 88.2 80.0 - 99.9 fL    MCH 29.6 26.0 - 35.0 pg    MCHC 33.5 32.0 - 34.5 %    RDW 13.8 11.5 - 15.0 fL    Platelets 312 024 - 569 E9/L    MPV 9.5 7.0 - 12.0 fL    Neutrophils % 84.9 (H) 43.0 - 80.0 %    Immature Granulocytes % 0.9 0.0 - 5.0 %    Lymphocytes % 7.4 (L) 20.0 - 42.0 %    Monocytes % 6.4 2.0 - 12.0 %    Eosinophils % 0.1 0.0 - 6.0 %    Basophils % 0.3 0.0 - 2.0 %    Neutrophils # 13.71 (H) 1.80 - 7.30 E9/L    Immature Granulocytes # 0.14 E9/L    Lymphocytes # 1.19 (L) 1.50 - 4.00 E9/L    Monocytes # 1.03 (H) 0.10 - 0.95 E9/L    Eosinophils # 0.01 (L) 0.05 - 0.50 E9/L    Basophils # 0.05 0.00 - 0.20 E9/L   Comprehensive metabolic panel   Result Value Ref Range    Sodium 141 132 - 146 mmol/L    Potassium 3.8 3.5 - 5.0 mmol/L    Chloride 105 98 - 107 mmol/L    CO2 25 22 - 29 mmol/L    Anion Gap 11 7 - 16 mmol/L    Glucose 159 (H) 74 - 109 mg/dL    BUN 9 8 - 23 mg/dL    CREATININE 0.9 0.5 - 1.0 mg/dL    GFR Non-African American >60 >=60 mL/min/1.73    GFR African American >60     Calcium 8.0 (L) 8.6 - 10.2 mg/dL    Total Protein 5.1 (L) 6.4 - 8.3 g/dL    Alb 2.8 (L) 3.5 - 5.2 g/dL    Total Bilirubin 0.4 0.0 - 1.2 mg/dL    Alkaline Phosphatase 56 35 - 104 U/L    ALT 16 0 - 32 U/L    AST 18 0 - 31 U/L   CBC Auto Differential   Result Value Ref Range    WBC 13.4 (H) 4.5 - 11.5 E9/L    RBC 3.00 (L) 3.50 - 5.50 E12/L    Hemoglobin 9.0 (L) 11.5 - 15.5 g/dL    Hematocrit 26.4 (L) 34.0 - 48.0 %    MCV 88.0 80.0 - 99.9 fL    MCH 30.0 26.0 - 35.0 pg    MCHC 34.1 32.0 - 34.5 %    RDW 14.3 11.5 - 15.0 fL    Platelets 157 419 - 231 E9/L    MPV 9.4 7.0 - 12.0 fL    Neutrophils % 71.9 43.0 - 80.0 %    Immature Granulocytes % 0.7 0.0 - 5.0 %    Lymphocytes % 15.5 (L) 20.0 - 42.0 %    Monocytes % 10.3 2.0 - 12.0 %    Eosinophils % 1.3 0.0 - 6.0 %    Basophils % 0.3 0.0 - 2.0 %    Neutrophils # 9.64 (H) 1.80 - 7.30 E9/L    Immature Granulocytes # 0.10 E9/L    Lymphocytes # 2.08 1.50 - 4.00 E9/L    Monocytes # 1.38 (H) 0.10 - 0.95 E9/L    Eosinophils # 0.17 0.05 - 0.50 E9/L    Basophils # 0.04 0.00 - 0.20 E9/L   Comprehensive Metabolic Panel   Result Value Ref Range    Sodium 143 132 - 146 mmol/L    Potassium 3.6 3.5 - 5.0 mmol/L    Chloride 113 (H) 98 - 107 mmol/L    CO2 26 22 - 29 mmol/L    Anion Gap 4 (L) 7 - 16 mmol/L    Glucose 135 (H) 74 - 109 mg/dL    BUN 8 8 - 23 mg/dL    CREATININE 0.9 0.5 - 1.0 mg/dL    GFR Non-African American >60 >=60 mL/min/1.73    GFR African American >60     Calcium 7.9 (L) 8.6 - 10.2 mg/dL    Total Protein 4.4 (L) 6.4 - 8.3 g/dL    Alb 2.4 (L) 3.5 - 5.2 g/dL    Total Bilirubin 0.4 0.0 - 1.2 mg/dL    Alkaline Phosphatase 47 35 - 104 U/L    ALT 13 0 - 32 U/L    AST 14 0 - 31 U/L   POCT chem basic w/ ICA   Result Value Ref Range    POC BUN 28     POC Chloride 105

## 2018-06-28 NOTE — PLAN OF CARE
Problem: Nutrition  Goal: Optimal nutrition therapy  Outcome: Ongoing  Nutrition Problem: Inadequate oral intake  Intervention: Food and/or Nutrient Delivery: Continue NPO, Continue Parenteral Nutrition  Nutritional Goals: PN to meet needs with good tolerance

## 2018-06-28 NOTE — CONSULTS
Nutrition Assessment    Type and Reason for Visit: Initial, Consult    Nutrition Recommendations: Current PN order is meeting needs, continue at current rate. Malnutrition Assessment:  · Malnutrition Status: At risk for malnutrition  · Context: Acute illness or injury  · Findings of the 6 clinical characteristics of malnutrition (Minimum of 2 out of 6 clinical characteristics is required to make the diagnosis of moderate or severe Protein Calorie Malnutrition based on AND/ASPEN Guidelines):  1. Energy Intake-Less than or equal to 75%,  (3 days)    2. Weight Loss-No significant weight loss,    3. Fat Loss-No significant subcutaneous fat loss,    4. Muscle Loss-No significant muscle mass loss,    5. Fluid Accumulation-No significant fluid accumulation,    6.  Strength-Not measured    Nutrition Diagnosis:   · Problem: Inadequate oral intake  · Etiology: related to Alteration in GI function (Diverticulitis/GI bleed)     Signs and symptoms:  as evidenced by Nutrition support - PN, Nausea, Vomiting    Nutrition Assessment:  · Subjective Assessment:  Per EMR, admitted with CC rectal bleeding and abdominal pain with multiple episodes of bloody BM's, underwent colonoscopy and N/V afterward.      · Nutrition-Focused Physical Findings: +6.6 L I&O's since admission, OR x 4, abdomen soft, rounded, no distention present, bowel sounds active, scabbed bilat shoulder. ; no wasting     · Wound Type: None     · Current Nutrition Therapies:  · Oral Diet Orders: Clear Liquid   · Oral Diet intake: 51-75% (per one meal recorded in flowsheets of clears)  · Oral Nutrition Supplement (ONS) Orders: None     · Parenteral Nutrition Orders:  · Type and Formula: 3-in-1 Standard   · Lipids: None  · Additives: Per physician's order  · Rate/Volume: 75 ml/hr, 1800 ml TV  · Duration: Continuous 24 hrs  · Current PN Order Provides: 1863 total kcal, 90 gm protein  · Goal PN Orders Provides: 1863 total kcal, 90 gm protein  · Additional Calories: None     · Anthropometric Measures:  · Ht: 5' 4\" (162.6 cm)   · Current Body Wt: 242 lb (109.8 kg) (6/28, bed scale)  · Admission Body Wt: 245 lb (111.1 kg) (6/25, actual)  · Usual Body Wt: 235 lb (106.6 kg) (5/9/17, actual, weight >1 year ago)  · % Weight Change: 4% gain,  ·   >1 year  · Ideal Body Wt: 120 lb (54.4 kg), % Ideal Body 204%  · Adjusted Body Wt: 151 lb (68.5 kg), body weight adjusted for Obesity  · BMI Classification: BMI > or equal to 40.0 Obese Class III     · Comparative Standards (Estimated Nutrition Needs):  · Estimated Daily Total Kcal: 7790-4198 kcal (MSJ REE 1609 x 1.1 SF )  · Estimated Daily Protein (g): 82-96  · Estimated Daily Total Fluid (ml/day): 9036-4932 ml    Estimated Intake vs Estimated Needs: Intake Meets Needs (with TPN)    Nutrition Risk Level: Moderate    Nutrition Interventions:   Continue NPO, Continue Parenteral Nutrition  Continued Inpatient Monitoring, Coordination of Care, Education Not Indicated    Nutrition Evaluation:   · Evaluation: Goals set   · Goals: PN to meet needs with good tolerance   · Monitoring: NPO Status, PN Intake, Skin Integrity, Fluid Balance, Weight, Comparative Standards, Pertinent Labs, Nausea or Vomiting, PN Tolerance    See Adult Nutrition Doc Flowsheet for more detail.      Electronically signed by Ivelisse Rodriguez on 6/28/18 at 9:41 AM    Contact Number: [phone]

## 2018-06-29 LAB
ALBUMIN SERPL-MCNC: 2.9 G/DL (ref 3.5–5.2)
ALP BLD-CCNC: 59 U/L (ref 35–104)
ALT SERPL-CCNC: 13 U/L (ref 0–32)
ANION GAP SERPL CALCULATED.3IONS-SCNC: 6 MMOL/L (ref 7–16)
AST SERPL-CCNC: 14 U/L (ref 0–31)
BASOPHILS ABSOLUTE: 0.05 E9/L (ref 0–0.2)
BASOPHILS RELATIVE PERCENT: 0.4 % (ref 0–2)
BILIRUB SERPL-MCNC: <0.2 MG/DL (ref 0–1.2)
BUN BLDV-MCNC: 10 MG/DL (ref 8–23)
CALCIUM SERPL-MCNC: 8.4 MG/DL (ref 8.6–10.2)
CHLORIDE BLD-SCNC: 108 MMOL/L (ref 98–107)
CO2: 28 MMOL/L (ref 22–29)
CREAT SERPL-MCNC: 0.9 MG/DL (ref 0.5–1)
EOSINOPHILS ABSOLUTE: 0.4 E9/L (ref 0.05–0.5)
EOSINOPHILS RELATIVE PERCENT: 3.5 % (ref 0–6)
GFR AFRICAN AMERICAN: >60
GFR NON-AFRICAN AMERICAN: >60 ML/MIN/1.73
GLUCOSE BLD-MCNC: 144 MG/DL (ref 74–109)
HCT VFR BLD CALC: 23.5 % (ref 34–48)
HCT VFR BLD CALC: 26.8 % (ref 34–48)
HEMOGLOBIN: 7.9 G/DL (ref 11.5–15.5)
HEMOGLOBIN: 8.9 G/DL (ref 11.5–15.5)
IMMATURE GRANULOCYTES #: 0.11 E9/L
IMMATURE GRANULOCYTES %: 1 % (ref 0–5)
LYMPHOCYTES ABSOLUTE: 1.94 E9/L (ref 1.5–4)
LYMPHOCYTES RELATIVE PERCENT: 17.2 % (ref 20–42)
MAGNESIUM: 2.2 MG/DL (ref 1.6–2.6)
MCH RBC QN AUTO: 29.8 PG (ref 26–35)
MCHC RBC AUTO-ENTMCNC: 33.2 % (ref 32–34.5)
MCV RBC AUTO: 89.6 FL (ref 80–99.9)
METER GLUCOSE: 155 MG/DL (ref 70–110)
METER GLUCOSE: 182 MG/DL (ref 70–110)
METER GLUCOSE: 186 MG/DL (ref 70–110)
METER GLUCOSE: 189 MG/DL (ref 70–110)
MONOCYTES ABSOLUTE: 1.04 E9/L (ref 0.1–0.95)
MONOCYTES RELATIVE PERCENT: 9.2 % (ref 2–12)
NEUTROPHILS ABSOLUTE: 7.73 E9/L (ref 1.8–7.3)
NEUTROPHILS RELATIVE PERCENT: 68.7 % (ref 43–80)
PDW BLD-RTO: 14.5 FL (ref 11.5–15)
PHOSPHORUS: 2.7 MG/DL (ref 2.5–4.5)
PLATELET # BLD: 186 E9/L (ref 130–450)
PMV BLD AUTO: 9.7 FL (ref 7–12)
POTASSIUM SERPL-SCNC: 3.8 MMOL/L (ref 3.5–5)
PREALBUMIN: 12 MG/DL (ref 20–40)
RBC # BLD: 2.99 E12/L (ref 3.5–5.5)
SODIUM BLD-SCNC: 142 MMOL/L (ref 132–146)
TOTAL PROTEIN: 5.3 G/DL (ref 6.4–8.3)
WBC # BLD: 11.3 E9/L (ref 4.5–11.5)

## 2018-06-29 PROCEDURE — 2580000003 HC RX 258: Performed by: INTERNAL MEDICINE

## 2018-06-29 PROCEDURE — 85025 COMPLETE CBC W/AUTO DIFF WBC: CPT

## 2018-06-29 PROCEDURE — 6360000002 HC RX W HCPCS: Performed by: INTERNAL MEDICINE

## 2018-06-29 PROCEDURE — 84134 ASSAY OF PREALBUMIN: CPT

## 2018-06-29 PROCEDURE — 6370000000 HC RX 637 (ALT 250 FOR IP): Performed by: INTERNAL MEDICINE

## 2018-06-29 PROCEDURE — 36592 COLLECT BLOOD FROM PICC: CPT

## 2018-06-29 PROCEDURE — 36415 COLL VENOUS BLD VENIPUNCTURE: CPT

## 2018-06-29 PROCEDURE — 82962 GLUCOSE BLOOD TEST: CPT

## 2018-06-29 PROCEDURE — 85014 HEMATOCRIT: CPT

## 2018-06-29 PROCEDURE — 84100 ASSAY OF PHOSPHORUS: CPT

## 2018-06-29 PROCEDURE — 85018 HEMOGLOBIN: CPT

## 2018-06-29 PROCEDURE — 80053 COMPREHEN METABOLIC PANEL: CPT

## 2018-06-29 PROCEDURE — 1200000000 HC SEMI PRIVATE

## 2018-06-29 PROCEDURE — 83735 ASSAY OF MAGNESIUM: CPT

## 2018-06-29 PROCEDURE — C9113 INJ PANTOPRAZOLE SODIUM, VIA: HCPCS | Performed by: INTERNAL MEDICINE

## 2018-06-29 RX ORDER — DEXTROSE MONOHYDRATE 100 MG/ML
INJECTION, SOLUTION INTRAVENOUS CONTINUOUS
Status: DISCONTINUED | OUTPATIENT
Start: 2018-06-29 | End: 2018-07-05 | Stop reason: HOSPADM

## 2018-06-29 RX ADMIN — PIPERACILLIN SODIUM AND TAZOBACTAM SODIUM 3.38 G: 3; .375 INJECTION, POWDER, LYOPHILIZED, FOR SOLUTION INTRAVENOUS at 15:10

## 2018-06-29 RX ADMIN — METOPROLOL TARTRATE 25 MG: 25 TABLET ORAL at 21:20

## 2018-06-29 RX ADMIN — FLUCONAZOLE 100 MG: 2 INJECTION INTRAVENOUS at 14:16

## 2018-06-29 RX ADMIN — PIPERACILLIN SODIUM AND TAZOBACTAM SODIUM 3.38 G: 3; .375 INJECTION, POWDER, LYOPHILIZED, FOR SOLUTION INTRAVENOUS at 07:00

## 2018-06-29 RX ADMIN — Medication 10 ML: at 14:17

## 2018-06-29 RX ADMIN — DEXTROSE MONOHYDRATE: 100 INJECTION, SOLUTION INTRAVENOUS at 17:51

## 2018-06-29 RX ADMIN — METOPROLOL TARTRATE 25 MG: 25 TABLET ORAL at 08:14

## 2018-06-29 RX ADMIN — LEVOTHYROXINE SODIUM 50 MCG: 25 TABLET ORAL at 07:00

## 2018-06-29 RX ADMIN — PANTOPRAZOLE SODIUM 40 MG: 40 INJECTION, POWDER, FOR SOLUTION INTRAVENOUS at 08:14

## 2018-06-29 ASSESSMENT — PAIN SCALES - GENERAL
PAINLEVEL_OUTOF10: 0
PAINLEVEL_OUTOF10: 0

## 2018-06-29 NOTE — PROGRESS NOTES
or malignancy. Final report differs from that generated by Real Paul. These findings were   communicated to radiology department personnel by critical results   form for further communication to the requesting physician at the   conclusion of this examination. Other Data:      Intake/Output Summary (Last 24 hours) at 06/29/18 0857  Last data filed at 06/29/18 0556   Gross per 24 hour   Intake          2670.54 ml   Output                0 ml   Net          2670.54 ml         Scheduled Medications:   fluconazole  100 mg Intravenous Q24H    metoprolol tartrate  25 mg Oral BID    levothyroxine  50 mcg Oral Daily    pantoprazole  40 mg Intravenous Daily    And    sodium chloride (PF)  10 mL Intravenous Daily    piperacillin-tazobactam  3.375 g Intravenous Q8H    sodium chloride (PF)  10 mL Intravenous 2 times per day         Infusion Medications:   PN-Adult  3 IN 1 Central Line (Standard) 75 mL/hr at 06/28/18 3033       Assessment:   Patient Active Problem List    Diagnosis Date Noted    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 06/27/2018    GI bleed 06/26/2018    Diverticulitis of intestine with bleeding 06/26/2018    Anemia due to blood loss, acute 06/26/2018    Essential hypertension 06/26/2018    Acquired hypothyroidism 06/26/2018    Osteoarthritis of multiple joints 06/26/2018    Essential hypertension 05/11/2017    Acquired hypothyroidism 05/11/2017    Class 2 obesity in adult 05/11/2017    Osteoarthritis of multiple joints 05/11/2017         Plan: Patient has been ambulating, white count normal, case discussed with GI fellow, I would continue antibiotic conservative treatment and watch her for any further signs and symptoms of any pelvic abscess development, antibiotic per ID, ambulate patient, cannot give Lovenox patient with lower GI bleed, when available TPN available patient can be switched over to D10. Continue current therapy.   See orders for further

## 2018-06-29 NOTE — PROGRESS NOTES
Aracelis. These findings were communicated to radiology department personnel by critical results form for further communication to the requesting physician at the conclusion of this examination. Ir Pily Rae Device Placement    Result Date: 6/28/2018  Location:200 Exam: IR FLUORO GUIDED CVA DEVICE PLACEMENT HISTORY:   GI bleed FLUOROSCOPY TIME:  0.2 minutes PROCEDURE:  The procedure was explained to the patient and informed consent was obtained. The skin over the  right arm was prepped and draped in a sterile fashion and then anesthetized with Lidocaine. Maximal sterile barrier technique was utilized. Under ultrasound guidance, the basilic vein was localized. Using a micropuncture needle, the vein was entered under direct ultrasound visualization. A 0.018 guidewire was advanced into the central venous circulation. A 5 Finnish peel-away sheath was placed. The 5 Finnish Power PICC line catheter was trimmed to 41 cm was then advanced through the peel-away sheath and positioned at the right atrial/SVC junction. Fluoroscopic spot film was obtained. FINDINGS:  Ultrasound shows the veins to be patent. Fluoroscopic spot film demonstrates the Power PICC line to be at the right atrial/SVC junction. Successful placement of a 5 Finnish double-lumen Power PICC line using ultrasound guidance via the right basilic  vein. Ir Ultrasound Guidance Vascular Access    Result Date: 6/28/2018  Location:200 Exam: IR ULTRASOUND GUIDANCE VASCULAR ACCESS History:  PICC line placement Ultrasound evaluation of potential access was performed. After successfully identifying a patent right basilic vein, and following the administration of lidocaine, real-time ultrasound guidance was used to puncture the right basilic   vein. A permanent recording was created for the patient's record. Ultrasound guidance was provided during placement of a PICC line.       Microbiology    MRSA NEG screen  Andrae Rene  8:41

## 2018-06-29 NOTE — PROGRESS NOTES
PROGRESS NOTE  By Phil Mosqueda D.O. [facility]  Dr. Dru Sal M.D.,  Dr. Ana Reddy M.D.,   Dr. Raisa Lazaro D.O.,  Dr. Sweetie Collins M.D.,  Dr Judith Moralez D.O. Debra WOLFE Cherol  68 y.o.  female    SUBJECTIVE:  Denies abdominal pain. Denies any further bleeding. Tolerating diet well. OBJECTIVE:    /68   Pulse 82   Temp 97.9 °F (36.6 °C) (Oral)   Resp 21   Ht 5' 4\" (1.626 m)   Wt 242 lb (109.8 kg)   SpO2 96%   BMI 41.54 kg/m²     General:NAD/ female  HEENT:anicteric sclerae/moist oral mucosa  Neck: supple  Chest:symmetrical excursions/nonlabored respirations  Abd.:soft/obese/NT  Extr.:decreased muscle tone and bulk throughout  Skin:warm and dry/anicteric      DATA:       Lab Results   Component Value Date    WBC 11.3 06/29/2018    RBC 2.99 06/29/2018    HGB 8.9 06/29/2018    HCT 26.8 06/29/2018    MCV 89.6 06/29/2018    MCH 29.8 06/29/2018    MCHC 33.2 06/29/2018    RDW 14.5 06/29/2018     06/29/2018    MPV 9.7 06/29/2018     Lab Results   Component Value Date     06/29/2018    K 3.8 06/29/2018     06/29/2018    CO2 28 06/29/2018    BUN 10 06/29/2018    CREATININE 0.9 06/29/2018    CALCIUM 8.4 06/29/2018    PROT 5.3 06/29/2018    LABALBU 2.9 06/29/2018    LABALBU 4.2 03/22/2012    BILITOT <0.2 06/29/2018    ALKPHOS 59 06/29/2018    AST 14 06/29/2018    ALT 13 06/29/2018         ASSESSMENT/PLAN:  1. Acute blood loss anemia, Hematochezia secondary to diverticular bleeding - status post colonoscopy hemostasis obtained with injection of epinephrine and clips on 6/27/18 - for details, please, refer to pertinent procedure note by Dr. Dakotah Abler. If patient is to rebleed she would likely require surgical intervention, GS is on the case. No further blood clots or hematochezia. No immediate plans for intervention from GI POV. Advance diet and if patient remains stable can be discharged from GI POV. Hgb holding stable ~9.   Patient abdominal pain managed, no peritoneal signs. Agree with abx coverage from admitting service. WBC improved.      Will follow as needed in the hospital. Please call with questions/concerns   Follow-up in the office in 2-3 weeks after discharge - patient to call for appointment at [facility] [phone]  Thank you    Nicolas Velazquez DO  6/29/2018  9:54 AM

## 2018-06-30 PROBLEM — I48.0 PAROXYSMAL ATRIAL FIBRILLATION WITH RAPID VENTRICULAR RESPONSE (HCC): Status: ACTIVE | Noted: 2018-06-29

## 2018-06-30 LAB
ANION GAP SERPL CALCULATED.3IONS-SCNC: 9 MMOL/L (ref 7–16)
ANION GAP SERPL CALCULATED.3IONS-SCNC: 9 MMOL/L (ref 7–16)
BUN BLDV-MCNC: 10 MG/DL (ref 8–23)
BUN BLDV-MCNC: 15 MG/DL (ref 8–23)
CALCIUM SERPL-MCNC: 8.6 MG/DL (ref 8.6–10.2)
CALCIUM SERPL-MCNC: 8.6 MG/DL (ref 8.6–10.2)
CHLORIDE BLD-SCNC: 109 MMOL/L (ref 98–107)
CHLORIDE BLD-SCNC: 109 MMOL/L (ref 98–107)
CO2: 25 MMOL/L (ref 22–29)
CO2: 26 MMOL/L (ref 22–29)
CREAT SERPL-MCNC: 0.9 MG/DL (ref 0.5–1)
CREAT SERPL-MCNC: 0.9 MG/DL (ref 0.5–1)
GFR AFRICAN AMERICAN: >60
GFR AFRICAN AMERICAN: >60
GFR NON-AFRICAN AMERICAN: >60 ML/MIN/1.73
GFR NON-AFRICAN AMERICAN: >60 ML/MIN/1.73
GLUCOSE BLD-MCNC: 134 MG/DL (ref 74–109)
GLUCOSE BLD-MCNC: 156 MG/DL (ref 74–109)
HCT VFR BLD CALC: 23.3 % (ref 34–48)
HCT VFR BLD CALC: 26.1 % (ref 34–48)
HEMOGLOBIN: 7.7 G/DL (ref 11.5–15.5)
HEMOGLOBIN: 8.7 G/DL (ref 11.5–15.5)
LEFT VENTRICULAR EJECTION FRACTION MODE: NORMAL
LV EF: 50 %
MAGNESIUM: 2 MG/DL (ref 1.6–2.6)
MAGNESIUM: 2.1 MG/DL (ref 1.6–2.6)
METER GLUCOSE: 128 MG/DL (ref 70–110)
METER GLUCOSE: 143 MG/DL (ref 70–110)
METER GLUCOSE: 148 MG/DL (ref 70–110)
METER GLUCOSE: 160 MG/DL (ref 70–110)
METER GLUCOSE: 183 MG/DL (ref 70–110)
PHOSPHORUS: 3 MG/DL (ref 2.5–4.5)
PHOSPHORUS: 3.1 MG/DL (ref 2.5–4.5)
POTASSIUM SERPL-SCNC: 3.6 MMOL/L (ref 3.5–5)
POTASSIUM SERPL-SCNC: 3.7 MMOL/L (ref 3.5–5)
SODIUM BLD-SCNC: 143 MMOL/L (ref 132–146)
SODIUM BLD-SCNC: 144 MMOL/L (ref 132–146)

## 2018-06-30 PROCEDURE — 2580000003 HC RX 258: Performed by: INTERNAL MEDICINE

## 2018-06-30 PROCEDURE — 80048 BASIC METABOLIC PNL TOTAL CA: CPT

## 2018-06-30 PROCEDURE — 93325 DOPPLER ECHO COLOR FLOW MAPG: CPT

## 2018-06-30 PROCEDURE — 85018 HEMOGLOBIN: CPT

## 2018-06-30 PROCEDURE — 6370000000 HC RX 637 (ALT 250 FOR IP): Performed by: INTERNAL MEDICINE

## 2018-06-30 PROCEDURE — 6370000000 HC RX 637 (ALT 250 FOR IP): Performed by: SPECIALIST

## 2018-06-30 PROCEDURE — 6360000002 HC RX W HCPCS: Performed by: INTERNAL MEDICINE

## 2018-06-30 PROCEDURE — 84100 ASSAY OF PHOSPHORUS: CPT

## 2018-06-30 PROCEDURE — 93308 TTE F-UP OR LMTD: CPT

## 2018-06-30 PROCEDURE — 1200000000 HC SEMI PRIVATE

## 2018-06-30 PROCEDURE — 36415 COLL VENOUS BLD VENIPUNCTURE: CPT

## 2018-06-30 PROCEDURE — 36592 COLLECT BLOOD FROM PICC: CPT

## 2018-06-30 PROCEDURE — 85014 HEMATOCRIT: CPT

## 2018-06-30 PROCEDURE — 83735 ASSAY OF MAGNESIUM: CPT

## 2018-06-30 PROCEDURE — 82962 GLUCOSE BLOOD TEST: CPT

## 2018-06-30 PROCEDURE — 93320 DOPPLER ECHO COMPLETE: CPT

## 2018-06-30 RX ORDER — LEVOFLOXACIN 500 MG/1
500 TABLET, FILM COATED ORAL DAILY
Status: DISCONTINUED | OUTPATIENT
Start: 2018-06-30 | End: 2018-07-02

## 2018-06-30 RX ORDER — METRONIDAZOLE 500 MG/1
500 TABLET ORAL EVERY 12 HOURS SCHEDULED
Status: DISCONTINUED | OUTPATIENT
Start: 2018-06-30 | End: 2018-07-02

## 2018-06-30 RX ORDER — PANTOPRAZOLE SODIUM 40 MG/1
40 TABLET, DELAYED RELEASE ORAL
Status: DISCONTINUED | OUTPATIENT
Start: 2018-07-01 | End: 2018-07-02

## 2018-06-30 RX ORDER — METOPROLOL TARTRATE 50 MG/1
100 TABLET, FILM COATED ORAL ONCE
Status: COMPLETED | OUTPATIENT
Start: 2018-06-30 | End: 2018-06-30

## 2018-06-30 RX ORDER — METOPROLOL TARTRATE 50 MG/1
50 TABLET, FILM COATED ORAL 2 TIMES DAILY
Status: DISCONTINUED | OUTPATIENT
Start: 2018-06-30 | End: 2018-07-01

## 2018-06-30 RX ORDER — FLUCONAZOLE 100 MG/1
200 TABLET ORAL DAILY
Status: DISCONTINUED | OUTPATIENT
Start: 2018-06-30 | End: 2018-07-02

## 2018-06-30 RX ADMIN — LEVOFLOXACIN 500 MG: 500 TABLET, FILM COATED ORAL at 12:01

## 2018-06-30 RX ADMIN — METOPROLOL TARTRATE 100 MG: 50 TABLET ORAL at 00:38

## 2018-06-30 RX ADMIN — METRONIDAZOLE 500 MG: 500 TABLET ORAL at 12:01

## 2018-06-30 RX ADMIN — LEVOTHYROXINE SODIUM 50 MCG: 25 TABLET ORAL at 06:41

## 2018-06-30 RX ADMIN — METOPROLOL TARTRATE 50 MG: 50 TABLET ORAL at 09:55

## 2018-06-30 RX ADMIN — METRONIDAZOLE 500 MG: 500 TABLET ORAL at 21:18

## 2018-06-30 RX ADMIN — PIPERACILLIN SODIUM AND TAZOBACTAM SODIUM 3.38 G: 3; .375 INJECTION, POWDER, LYOPHILIZED, FOR SOLUTION INTRAVENOUS at 06:41

## 2018-06-30 RX ADMIN — METOPROLOL TARTRATE 50 MG: 50 TABLET ORAL at 21:18

## 2018-06-30 RX ADMIN — Medication 10 ML: at 21:20

## 2018-06-30 RX ADMIN — CHOLECALCIFEROL TAB 125 MCG (5000 UNIT) 5000 UNITS: 125 TAB at 12:01

## 2018-06-30 RX ADMIN — PIPERACILLIN SODIUM AND TAZOBACTAM SODIUM 3.38 G: 3; .375 INJECTION, POWDER, LYOPHILIZED, FOR SOLUTION INTRAVENOUS at 00:41

## 2018-06-30 RX ADMIN — FLUCONAZOLE 200 MG: 100 TABLET ORAL at 12:01

## 2018-06-30 ASSESSMENT — PAIN SCALES - GENERAL
PAINLEVEL_OUTOF10: 0

## 2018-06-30 NOTE — PROGRESS NOTES
[facility] Progress Note    Hospital Day: Hospital Day: 6  PT Name: Pelon Noland  MRN: [id number]  Primary Care Physician: Joya Bernheim, MD  Requesting physician:   Joya Bernheim, MD    Reason for Admission:   Chief Complaint   Patient presents with    Rectal Bleeding     Reason for consultation: microperf  Chief Complaint: none      Assessment  Pelon Noland is a 68y.o. year old female who presented on 6/25/2018 and is being treated for Diverticulitis of intestine with bleeding   Chief Complaint   Patient presents with    Rectal Bleeding      leukocytosis better  1. Lower GI bleed    2. Abdominal pain, unspecified abdominal location      -distal colon with diverticula, mild mural thickening and mild pericolonic stranding microperforation. Plan    -cont antifungal and piptazo  Change to po atbx levaquin/flagyl  Follow cbc      · Watch for diarrhea/cdiff. · Monitor labs  · Otherwise continue current therapy. · Please see orders for further management and care. Subjective  Marielena Valdivia was seen and examined at bedside today. All patient questions were answered and all tests were reviewed. NO  family present during my examination. There are no adverse drug reactions noted including rash or itching.  Otherwise shestates that there are no new complaints at this time  She wants to go home    Hospital Medications    vitamin D3 (CHOLECALCIFEROL) tablet 5,000 Units Daily   metoprolol tartrate (LOPRESSOR) tablet 50 mg BID   [START ON 7/1/2018] pantoprazole (PROTONIX) tablet 40 mg QAM AC   dextrose 10 % injection Continuous   heparin flush 100 UNIT/ML injection 100 Units PRN   promethazine (PHENERGAN) injection 12.5 mg Q6H PRN   ondansetron (ZOFRAN) injection 4 mg Q6H PRN   fluconazole (DIFLUCAN) 100 mg in 0.9 % NaCl 50 mL premix IVPB Q24H   levothyroxine (SYNTHROID) tablet 50 mcg Daily   sodium chloride (PF) 0.9 % injection 10 mL Daily   piperacillin-tazobactam (ZOSYN) 3.375 g in dextrose 5 % 50 mL IVPB extended is no suspicious lytic or blastic osseous lesion. There is lower lumbar spondylosis. 1. New surgical clips within the right colon with a few tiny adjacent foci of air, possibly related to microperforation. 2. Scattered colonic diverticula without evidence of acute diverticulitis. 3. Diffuse fatty infiltration of the liver. Findings were discussed with Dr. Phil Mosqueda on 6/27/2018 at 5:45 PM.    Ct Abdomen Pelvis W Iv Contrast Additional Contrast? None    Result Date: 6/26/2018  Reading location: 200 INDICATION: Abdominal pain, rectal bleeding FINDINGS: Axial CT images through the abdomen and pelvis after intravenous injection 80 mL Isovue-370. No oral contrast which limits evaluation of bowel and adjacent structures particularly with regards to patient complains. Automated dose control. . Normal-sized heart. No focal consolidation visualized lung bases. Diffuse low-attenuation liver parenchyma probably fatty infiltration. Oval 12 mm low-attenuation structure upper pole left kidney could be a cyst. Sonogram would confirm. 7 mm calculus centrally in the left kidney. No other focal or malleable abdominal solid organs. Bowel and bile ducts and urinary tract appear normal caliber. Multiple colonic diverticula. Mild stranding in the mesentery adjacent to the distal descending and sigmoid segments of the colon. Associated mural thickening. Small nodules within the mesentery ranging up to about 12 mm in diameter. No extraluminal air or fluid collection. Unremarkable arterial enhancement. Scattered atherosclerosis. Multilevel spondylosis. Grade 1 L5-S1 spondylolisthesis. 1. No evidence of urinary or bowel obstruction. 2. Abnormal appearance the distal colon with diverticula, mild mural thickening and mild pericolonic stranding. Small nodules are noted within the mesentery which could be focally enlarged lymph nodes range up to about 12 mm in diameter. Differential includes low-grade diverticulitis or malignancy. Final report differs from that generated by Anh Coulter. These findings were communicated to radiology department personnel by critical results form for further communication to the requesting physician at the conclusion of this examination. Ir Derek Pat Device Placement    Result Date: 6/28/2018  Location:200 Exam: IR FLUORO GUIDED CVA DEVICE PLACEMENT HISTORY:   GI bleed FLUOROSCOPY TIME:  0.2 minutes PROCEDURE:  The procedure was explained to the patient and informed consent was obtained. The skin over the  right arm was prepped and draped in a sterile fashion and then anesthetized with Lidocaine. Maximal sterile barrier technique was utilized. Under ultrasound guidance, the basilic vein was localized. Using a micropuncture needle, the vein was entered under direct ultrasound visualization. A 0.018 guidewire was advanced into the central venous circulation. A 5 Vietnamese peel-away sheath was placed. The 5 Vietnamese Power PICC line catheter was trimmed to 41 cm was then advanced through the peel-away sheath and positioned at the right atrial/SVC junction. Fluoroscopic spot film was obtained. FINDINGS:  Ultrasound shows the veins to be patent. Fluoroscopic spot film demonstrates the Power PICC line to be at the right atrial/SVC junction. Successful placement of a 5 Vietnamese double-lumen Power PICC line using ultrasound guidance via the right basilic  vein. Ir Ultrasound Guidance Vascular Access    Result Date: 6/28/2018  Location:200 Exam: IR ULTRASOUND GUIDANCE VASCULAR ACCESS History:  PICC line placement Ultrasound evaluation of potential access was performed. After successfully identifying a patent right basilic vein, and following the administration of lidocaine, real-time ultrasound guidance was used to puncture the right basilic   vein. A permanent recording was created for the patient's record. Ultrasound guidance was provided during placement of a PICC line.       Microbiology    MRSA NEG screen  [facility]  10:09 AM  6/30/2018

## 2018-06-30 NOTE — PROGRESS NOTES
Patient ID:  David Rosales  [id number]  45 y.o.  1945    HPI:  Patient voiced no new complaints since hospital admission. Questions, answers, and tests reviewed. ROS:  Cardiovascular:   Denies any chest pain, irregular heartbeats, or palpitations. Respiratory:   Denies shortness of breath, coughing, sputum production, hemoptysis, or wheezing. Gastrointestinal:   Denies nausea, vomiting, diarrhea, or constipation. Denies any abdominal pain. Extremities:   Denies any lower extremity swelling or edema. Neurology:    Denies any headache or focal neurological deficits. No weakness or paresthesia. Derm:    Denies any rashes, ulcers, or excoriations. Denies bruising. Genitourinary:    Denies any urgency, frequency, hematuria. Voiding without difficulty. Physical Exam:    Vitals:    06/30/18 0831   BP: 126/62   Pulse: 80   Resp: 18   Temp: 98.6 °F (37 °C)   SpO2: 98%       HEENT:  PERRLA. EOMI. Sclera clear. Buccal mucosa moist.    Neck:  Supple. Trachea midline. No thyromegaly. No JVD. No bruits. Heart:  Rhythm regular, rate controlled. No murmurs. Lungs:  Symmetrical. Clear to auscultation bilaterally. No wheezes. No rhonchi. No rales. Abdomen: Soft. Non-tender. Non-distended. Bowel sounds positive. No organomegaly or masses. No pain on palpation    Extremities:  Peripheral pulses present. No peripheral edema. No ulcers. Neurologic:  Alert x 3. No focal deficit. Cranial nerves grossly intact. Skin:  No petechia. No hemorrhage. No wounds.     Labs:  CBC: Lab Results   Component Value Date    WBC 11.3 06/29/2018    RBC 2.99 06/29/2018    HGB 8.7 06/30/2018    HCT 26.1 06/30/2018    MCV 89.6 06/29/2018    MCH 29.8 06/29/2018    MCHC 33.2 06/29/2018    RDW 14.5 06/29/2018     06/29/2018    MPV 9.7 06/29/2018     CMP:  Lab Results   Component Value Date     06/30/2018    K 3.7 06/30/2018     06/30/2018    CO2 25 06/30/2018    BUN 10 06/30/2018    CREATININE 0.9 06/30/2018    GFRAA >60 06/30/2018    LABGLOM >60 06/30/2018    GLUCOSE 134 06/30/2018    GLUCOSE 87 03/22/2012    PROT 5.3 06/29/2018    LABALBU 2.9 06/29/2018    LABALBU 4.2 03/22/2012    CALCIUM 8.6 06/30/2018    BILITOT <0.2 06/29/2018    ALKPHOS 59 06/29/2018    AST 14 06/29/2018    ALT 13 06/29/2018     PT/INR:    Lab Results   Component Value Date    PROTIME 12.7 06/28/2018    INR 1.1 06/28/2018         RADIOLOGY REPORT   Final Result      IR ULTRASOUND GUIDANCE VASCULAR ACCESS   Final Result   Ultrasound guidance was provided during placement of a   PICC line. IR FLUORO GUIDED CVA DEVICE PLACEMENT   Final Result   Successful placement of a 5 Czech double-lumen Power   PICC line using ultrasound guidance via the right basilic  vein. CT ABDOMEN PELVIS W IV CONTRAST Additional Contrast? None   Final Result   1. New surgical clips within the right colon with a few tiny adjacent   foci of air, possibly related to microperforation. 2. Scattered colonic diverticula without evidence of acute   diverticulitis. 3. Diffuse fatty infiltration of the liver. Findings were discussed with Dr. Luis Knapp on 6/27/2018 at 5:45   PM.      NM GI BLOOD LOSS   Final Result   Addendum 1 of 1   Addendum: Dr. Neal Freshwater notified of this finding via telephone   conversation at 9:45 AM. Active GI bleeding appears to be in the   region of the proximal colon near the hepatic flexure. Final   Active gastrointestinal hemorrhage proximal colon. THIS IS AN ABNORMAL REPORT. PLEASE TAKE APPROPRIATE MEASURES. .         CT ABDOMEN PELVIS W IV CONTRAST Additional Contrast? None   Final Result   1. No evidence of urinary or bowel obstruction. 2. Abnormal appearance the distal colon with diverticula, mild mural   thickening and mild pericolonic stranding. Small nodules are noted   within the mesentery which could be focally enlarged lymph nodes range   up to about 12 mm in diameter.  Differential includes low-grade   diverticulitis or malignancy. Final report differs from that generated by Sonia Browne. These findings were   communicated to radiology department personnel by critical results   form for further communication to the requesting physician at the   conclusion of this examination. Other Data:      Intake/Output Summary (Last 24 hours) at 06/30/18 0917  Last data filed at 06/29/18 1932   Gross per 24 hour   Intake              360 ml   Output                0 ml   Net              360 ml         Scheduled Medications:   vitamin D3  5,000 Units Oral Daily    metoprolol tartrate  50 mg Oral BID    [START ON 7/1/2018] pantoprazole  40 mg Oral QAM AC    fluconazole  100 mg Intravenous Q24H    levothyroxine  50 mcg Oral Daily    sodium chloride (PF)  10 mL Intravenous Daily    piperacillin-tazobactam  3.375 g Intravenous Q8H    sodium chloride (PF)  10 mL Intravenous 2 times per day         Infusion Medications:   dextrose 40 mL/hr at 06/29/18 2292       Assessment:   Patient Active Problem List    Diagnosis Date Noted    Paroxysmal atrial fibrillation with rapid ventricular response ([facility] [address] 06/29/2018    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 06/27/2018    GI bleed 06/26/2018    Diverticulitis of intestine with bleeding 06/26/2018    Anemia due to blood loss, acute 06/26/2018    Essential hypertension 06/26/2018    Acquired hypothyroidism 06/26/2018    Osteoarthritis of multiple joints 06/26/2018    Essential hypertension 05/11/2017    Acquired hypothyroidism 05/11/2017    Class 2 obesity in adult 05/11/2017    Osteoarthritis of multiple joints 05/11/2017         Plan: Patient had paroxysmal atrial fibrillation with rapid ventricle response converted back to sinus rhythm with high dose of metoprolol, check 2-D echo, increase metoprolol 50 one by mouth twice a day. Discharge planning probable home tomorrow if no further episode of A. fib.  Patient not candidate for anticoagulation with recent lower GI bleed. Continue current therapy. See orders for further plan of care.     Completed By:  Dr. Rita Sandoval MD, [facility].  9:17 AM  6/30/2018      Electronically signed by Viki Zaman MD on 6/30/18 at 9:17 AM

## 2018-07-01 LAB
ABO/RH: NORMAL
ANTIBODY SCREEN: NORMAL
HCT VFR BLD CALC: 24.4 % (ref 34–48)
HEMOGLOBIN: 8 G/DL (ref 11.5–15.5)
METER GLUCOSE: 132 MG/DL (ref 70–110)
METER GLUCOSE: 137 MG/DL (ref 70–110)
METER GLUCOSE: 144 MG/DL (ref 70–110)

## 2018-07-01 PROCEDURE — 6370000000 HC RX 637 (ALT 250 FOR IP): Performed by: INTERNAL MEDICINE

## 2018-07-01 PROCEDURE — 86900 BLOOD TYPING SEROLOGIC ABO: CPT

## 2018-07-01 PROCEDURE — 2580000003 HC RX 258: Performed by: INTERNAL MEDICINE

## 2018-07-01 PROCEDURE — 86923 COMPATIBILITY TEST ELECTRIC: CPT

## 2018-07-01 PROCEDURE — 6360000002 HC RX W HCPCS: Performed by: RADIOLOGY

## 2018-07-01 PROCEDURE — 86901 BLOOD TYPING SEROLOGIC RH(D): CPT

## 2018-07-01 PROCEDURE — 6370000000 HC RX 637 (ALT 250 FOR IP): Performed by: SPECIALIST

## 2018-07-01 PROCEDURE — 93005 ELECTROCARDIOGRAM TRACING: CPT | Performed by: INTERNAL MEDICINE

## 2018-07-01 PROCEDURE — 36415 COLL VENOUS BLD VENIPUNCTURE: CPT

## 2018-07-01 PROCEDURE — P9016 RBC LEUKOCYTES REDUCED: HCPCS

## 2018-07-01 PROCEDURE — 85018 HEMOGLOBIN: CPT

## 2018-07-01 PROCEDURE — 82962 GLUCOSE BLOOD TEST: CPT

## 2018-07-01 PROCEDURE — 1200000000 HC SEMI PRIVATE

## 2018-07-01 PROCEDURE — 86850 RBC ANTIBODY SCREEN: CPT

## 2018-07-01 PROCEDURE — 2580000003 HC RX 258

## 2018-07-01 PROCEDURE — 36592 COLLECT BLOOD FROM PICC: CPT

## 2018-07-01 PROCEDURE — 85014 HEMATOCRIT: CPT

## 2018-07-01 RX ORDER — SODIUM CHLORIDE 0.9 % (FLUSH) 0.9 %
SYRINGE (ML) INJECTION
Status: COMPLETED
Start: 2018-07-01 | End: 2018-07-01

## 2018-07-01 RX ORDER — METOPROLOL TARTRATE 50 MG/1
50 TABLET, FILM COATED ORAL EVERY 8 HOURS SCHEDULED
Status: DISCONTINUED | OUTPATIENT
Start: 2018-07-01 | End: 2018-07-02

## 2018-07-01 RX ORDER — 0.9 % SODIUM CHLORIDE 0.9 %
250 INTRAVENOUS SOLUTION INTRAVENOUS ONCE
Status: COMPLETED | OUTPATIENT
Start: 2018-07-01 | End: 2018-07-02

## 2018-07-01 RX ADMIN — Medication 100 UNITS: at 10:06

## 2018-07-01 RX ADMIN — METRONIDAZOLE 500 MG: 500 TABLET ORAL at 22:44

## 2018-07-01 RX ADMIN — LEVOTHYROXINE SODIUM 50 MCG: 25 TABLET ORAL at 06:30

## 2018-07-01 RX ADMIN — Medication 10 ML: at 12:28

## 2018-07-01 RX ADMIN — LEVOFLOXACIN 500 MG: 500 TABLET, FILM COATED ORAL at 10:06

## 2018-07-01 RX ADMIN — METOPROLOL TARTRATE 50 MG: 50 TABLET ORAL at 22:44

## 2018-07-01 RX ADMIN — CHOLECALCIFEROL TAB 125 MCG (5000 UNIT) 5000 UNITS: 125 TAB at 10:07

## 2018-07-01 RX ADMIN — METOPROLOL TARTRATE 50 MG: 50 TABLET ORAL at 13:58

## 2018-07-01 RX ADMIN — FLUCONAZOLE 200 MG: 100 TABLET ORAL at 10:07

## 2018-07-01 RX ADMIN — METRONIDAZOLE 500 MG: 500 TABLET ORAL at 10:06

## 2018-07-01 RX ADMIN — SODIUM CHLORIDE 250 ML: 9 INJECTION, SOLUTION INTRAVENOUS at 12:27

## 2018-07-01 RX ADMIN — Medication 10 ML: at 22:44

## 2018-07-01 RX ADMIN — PANTOPRAZOLE SODIUM 40 MG: 40 TABLET, DELAYED RELEASE ORAL at 06:30

## 2018-07-01 RX ADMIN — Medication 10 ML: at 10:07

## 2018-07-01 ASSESSMENT — PAIN SCALES - GENERAL
PAINLEVEL_OUTOF10: 0

## 2018-07-01 NOTE — PROGRESS NOTES
Patient ID:  Rosmery [place]  [id number]  51 y.o.  1945    HPI:  Patient voiced no new complaints since hospital admission. Questions, answers, and tests reviewed. ROS:  Cardiovascular:   Denies any chest pain, irregular heartbeats, or palpitations. Respiratory:   Denies shortness of breath, coughing, sputum production, hemoptysis, or wheezing. Gastrointestinal:   Denies nausea, vomiting, diarrhea, or constipation. Denies any abdominal pain. Extremities:   Denies any lower extremity swelling or edema. Neurology:    Denies any headache or focal neurological deficits. No weakness or paresthesia. Derm:    Denies any rashes, ulcers, or excoriations. Denies bruising. Genitourinary:    Denies any urgency, frequency, hematuria. Voiding without difficulty. Physical Exam:    Vitals:    07/01/18 0830   BP: (!) 151/72   Pulse: 88   Resp:    Temp: 97.7 °F (36.5 °C)   SpO2: 100%       HEENT:  PERRLA. EOMI. Sclera clear. Buccal mucosa moist.    Neck:  Supple. Trachea midline. No thyromegaly. No JVD. No bruits. Heart:  Rhythm regular, rate controlled. No murmurs. Lungs:  Symmetrical. Clear to auscultation bilaterally. No wheezes. No rhonchi. No rales. Abdomen: Soft. Non-tender. Non-distended. Bowel sounds positive. No organomegaly or masses. No pain on palpation    Extremities:  Peripheral pulses present. No peripheral edema. No ulcers. Neurologic:  Alert x 3. No focal deficit. Cranial nerves grossly intact. Skin:  No petechia. No hemorrhage. No wounds.     Labs:  CBC: Lab Results   Component Value Date    WBC 11.3 06/29/2018    RBC 2.99 06/29/2018    HGB 8.0 07/01/2018    HCT 24.4 07/01/2018    MCV 89.6 06/29/2018    MCH 29.8 06/29/2018    MCHC 33.2 06/29/2018    RDW 14.5 06/29/2018     06/29/2018    MPV 9.7 06/29/2018     CMP:  Lab Results   Component Value Date     06/30/2018    K 3.6 06/30/2018     06/30/2018    CO2 26 06/30/2018    BUN 15 06/30/2018    CREATININE 0.9 06/30/2018    GFRAA >60 06/30/2018    LABGLOM >60 06/30/2018    GLUCOSE 156 06/30/2018    GLUCOSE 87 03/22/2012    PROT 5.3 06/29/2018    LABALBU 2.9 06/29/2018    LABALBU 4.2 03/22/2012    CALCIUM 8.6 06/30/2018    BILITOT <0.2 06/29/2018    ALKPHOS 59 06/29/2018    AST 14 06/29/2018    ALT 13 06/29/2018     PT/INR:    Lab Results   Component Value Date    PROTIME 12.7 06/28/2018    INR 1.1 06/28/2018         RADIOLOGY REPORT   Final Result      IR ULTRASOUND GUIDANCE VASCULAR ACCESS   Final Result   Ultrasound guidance was provided during placement of a   PICC line. IR FLUORO GUIDED CVA DEVICE PLACEMENT   Final Result   Successful placement of a 5 [place] double-lumen Power   PICC line using ultrasound guidance via the right basilic  vein. CT ABDOMEN PELVIS W IV CONTRAST Additional Contrast? None   Final Result   1. New surgical clips within the right colon with a few tiny adjacent   foci of air, possibly related to microperforation. 2. Scattered colonic diverticula without evidence of acute   diverticulitis. 3. Diffuse fatty infiltration of the liver. Findings were discussed with Dr. Howard Azar on 6/27/2018 at 5:45   PM.      NM GI BLOOD LOSS   Final Result   Addendum 1 of 1   Addendum: Dr. Cooper Ke notified of this finding via telephone   conversation at 9:45 AM. Active GI bleeding appears to be in the   region of the proximal colon near the hepatic flexure. Final   Active gastrointestinal hemorrhage proximal colon. THIS IS AN ABNORMAL REPORT. PLEASE TAKE APPROPRIATE MEASURES. .         CT ABDOMEN PELVIS W IV CONTRAST Additional Contrast? None   Final Result   1. No evidence of urinary or bowel obstruction. 2. Abnormal appearance the distal colon with diverticula, mild mural   thickening and mild pericolonic stranding. Small nodules are noted   within the mesentery which could be focally enlarged lymph nodes range   up to about 12 mm in diameter.  Differential includes low-grade   diverticulitis or malignancy. Final report differs from that generated by Sri Novoa. These findings were   communicated to radiology department personnel by critical results   form for further communication to the requesting physician at the   conclusion of this examination.              Other Data:      Intake/Output Summary (Last 24 hours) at 07/01/18 1037  Last data filed at 07/01/18 0536   Gross per 24 hour   Intake              760 ml   Output                0 ml   Net              760 ml         Scheduled Medications:   metoprolol tartrate  50 mg Oral 3 times per day    sodium chloride  250 mL Intravenous Once    vitamin D3  5,000 Units Oral Daily    pantoprazole  40 mg Oral QAM AC    levofloxacin  500 mg Oral Daily    metroNIDAZOLE  500 mg Oral 2 times per day    fluconazole  200 mg Oral Daily    levothyroxine  50 mcg Oral Daily    sodium chloride (PF)  10 mL Intravenous Daily    sodium chloride (PF)  10 mL Intravenous 2 times per day         Infusion Medications:   dextrose Stopped (07/01/18 0024)       Assessment:   Patient Active Problem List    Diagnosis Date Noted    Paroxysmal atrial fibrillation with rapid ventricular response ([facility] [address] 06/29/2018    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 06/27/2018    GI bleed 06/26/2018    Diverticulitis of intestine with bleeding 06/26/2018    Anemia due to blood loss, acute 06/26/2018    Essential hypertension 06/26/2018    Acquired hypothyroidism 06/26/2018    Osteoarthritis of multiple joints 06/26/2018    Essential hypertension 05/11/2017    Acquired hypothyroidism 05/11/2017    Class 2 obesity in adult 05/11/2017    Osteoarthritis of multiple joints 05/11/2017         Plan: Patient had episodes of paroxysmal atrial fibrillation last night, nobody bothered to call me, patient hemoglobin is 8 g percent, patient complains of easily tired fatigue, transfuse 2 units packed red blood cell, increase metoprolol 50 mg 1 by mouth 3 times a day, patient was explained of blood count stays stable and no further A. fib patient probably can be discharged tomorrow. Continue current therapy. See orders for further plan of care.     Completed By:  Dr. Dane Carson MD, Eloisa Parikh.  10:37 AM  7/1/2018      Electronically signed by Michael Wright MD on 7/1/18 at 10:37 AM

## 2018-07-01 NOTE — PROGRESS NOTES
[facility] Progress Note    Hospital Day: Hospital Day: 7  PT Name: Ruben Maciel  MRN: [id number]  Primary Care Physician: Rhea Vazquez MD  Requesting physician:   Rhea Vazquez MD    Reason for Admission:   Chief Complaint   Patient presents with    Rectal Bleeding     Reason for consultation: microperf  Chief Complaint: none      Assessment  Ruben Maciel is a 68y.o. year old female who presented on 6/25/2018 and is being treated for Diverticulitis of intestine with bleeding   Chief Complaint   Patient presents with    Rectal Bleeding      leukocytosis better  1. Lower GI bleed    2. Abdominal pain, unspecified abdominal location      -distal colon with diverticula, mild mural thickening and mild pericolonic stranding microperforation. Plan    -cont antifungal and piptazo  Change to po atbx levaquin/flagyl  Follow cbc      · Watch for diarrhea/cdiff. · Monitor labs  · Otherwise continue current therapy. · Please see orders for further management and care. Subjective  Ricardo Doctor was seen and examined at bedside today. All patient questions were answered and all tests were reviewed. family present during my examination. There are no adverse drug reactions noted including rash or itching.  Otherwise shestates that there are no new complaints at this time  She wants to go home  Had nausea/diarrhea  Getting blood    Hospital Medications    metoprolol tartrate (LOPRESSOR) tablet 50 mg 3 times per day   0.9 % sodium chloride bolus Once   vitamin D3 (CHOLECALCIFEROL) tablet 5,000 Units Daily   pantoprazole (PROTONIX) tablet 40 mg QAM AC   levofloxacin (LEVAQUIN) tablet 500 mg Daily   metroNIDAZOLE (FLAGYL) tablet 500 mg 2 times per day   fluconazole (DIFLUCAN) tablet 200 mg Daily   dextrose 10 % injection Continuous   heparin flush 100 UNIT/ML injection 100 Units PRN   promethazine (PHENERGAN) injection 12.5 mg Q6H PRN   ondansetron (ZOFRAN) injection 4 mg Q6H PRN   levothyroxine (SYNTHROID) tablet 50 mcg The abdominal aorta is normal in caliber and enhancement. There is no suspicious lytic or blastic osseous lesion. There is lower lumbar spondylosis. 1. New surgical clips within the right colon with a few tiny adjacent foci of air, possibly related to microperforation. 2. Scattered colonic diverticula without evidence of acute diverticulitis. 3. Diffuse fatty infiltration of the liver. Findings were discussed with Dr. Kareem Parra on 6/27/2018 at 5:45 PM.    Ct Abdomen Pelvis W Iv Contrast Additional Contrast? None    Result Date: 6/26/2018  Reading location: [facility] [place] INDICATION: Abdominal pain, rectal bleeding FINDINGS: Axial CT images through the abdomen and pelvis after intravenous injection 80 mL [id number]. No oral contrast which limits evaluation of bowel and adjacent structures particularly with regards to patient complains. Automated dose control. . Normal-sized heart. No focal consolidation visualized lung bases. Diffuse low-attenuation liver parenchyma probably fatty infiltration. Oval 12 mm low-attenuation structure upper pole left kidney could be a cyst. Sonogram would confirm. 7 mm calculus centrally in the left kidney. No other focal or malleable abdominal solid organs. Bowel and bile ducts and urinary tract appear normal caliber. Multiple colonic diverticula. Mild stranding in the mesentery adjacent to the distal descending and sigmoid segments of the colon. Associated mural thickening. Small nodules within the mesentery ranging up to about 12 mm in diameter. No extraluminal air or fluid collection. Unremarkable arterial enhancement. Scattered atherosclerosis. Multilevel spondylosis. Grade 1 L5-S1 spondylolisthesis. 1. No evidence of urinary or bowel obstruction. 2. Abnormal appearance the distal colon with diverticula, mild mural thickening and mild pericolonic stranding. Small nodules are noted within the mesentery which could be focally enlarged lymph nodes range up to about 12 mm in diameter.

## 2018-07-02 ENCOUNTER — ANESTHESIA (OUTPATIENT)
Dept: OPERATING ROOM | Age: 73
DRG: 330 | End: 2018-07-02
Payer: COMMERCIAL

## 2018-07-02 ENCOUNTER — ANESTHESIA EVENT (OUTPATIENT)
Dept: OPERATING ROOM | Age: 73
DRG: 330 | End: 2018-07-02
Payer: COMMERCIAL

## 2018-07-02 VITALS
RESPIRATION RATE: 1 BRPM | DIASTOLIC BLOOD PRESSURE: 45 MMHG | SYSTOLIC BLOOD PRESSURE: 152 MMHG | OXYGEN SATURATION: 94 % | TEMPERATURE: 96.6 F

## 2018-07-02 LAB
ALBUMIN SERPL-MCNC: 2.7 G/DL (ref 3.5–5.2)
ALP BLD-CCNC: 52 U/L (ref 35–104)
ALT SERPL-CCNC: 11 U/L (ref 0–32)
ANION GAP SERPL CALCULATED.3IONS-SCNC: 7 MMOL/L (ref 7–16)
AST SERPL-CCNC: 15 U/L (ref 0–31)
BASOPHILS ABSOLUTE: 0.07 E9/L (ref 0–0.2)
BASOPHILS RELATIVE PERCENT: 0.7 % (ref 0–2)
BILIRUB SERPL-MCNC: 0.3 MG/DL (ref 0–1.2)
BLOOD BANK DISPENSE STATUS: NORMAL
BLOOD BANK PRODUCT CODE: NORMAL
BPU ID: NORMAL
BUN BLDV-MCNC: 13 MG/DL (ref 8–23)
CALCIUM SERPL-MCNC: 8.5 MG/DL (ref 8.6–10.2)
CHLORIDE BLD-SCNC: 109 MMOL/L (ref 98–107)
CO2: 27 MMOL/L (ref 22–29)
CREAT SERPL-MCNC: 0.9 MG/DL (ref 0.5–1)
DESCRIPTION BLOOD BANK: NORMAL
EOSINOPHILS ABSOLUTE: 0.36 E9/L (ref 0.05–0.5)
EOSINOPHILS RELATIVE PERCENT: 3.6 % (ref 0–6)
GFR AFRICAN AMERICAN: >60
GFR NON-AFRICAN AMERICAN: >60 ML/MIN/1.73
GLUCOSE BLD-MCNC: 129 MG/DL (ref 74–109)
HCT VFR BLD CALC: 26.5 % (ref 34–48)
HCT VFR BLD CALC: 28.6 % (ref 34–48)
HEMOGLOBIN: 8.7 G/DL (ref 11.5–15.5)
HEMOGLOBIN: 9.4 G/DL (ref 11.5–15.5)
IMMATURE GRANULOCYTES #: 0.1 E9/L
IMMATURE GRANULOCYTES %: 1 % (ref 0–5)
LYMPHOCYTES ABSOLUTE: 1.7 E9/L (ref 1.5–4)
LYMPHOCYTES RELATIVE PERCENT: 16.9 % (ref 20–42)
MCH RBC QN AUTO: 29.7 PG (ref 26–35)
MCHC RBC AUTO-ENTMCNC: 32.8 % (ref 32–34.5)
MCV RBC AUTO: 90.4 FL (ref 80–99.9)
MONOCYTES ABSOLUTE: 1.01 E9/L (ref 0.1–0.95)
MONOCYTES RELATIVE PERCENT: 10 % (ref 2–12)
NEUTROPHILS ABSOLUTE: 6.81 E9/L (ref 1.8–7.3)
NEUTROPHILS RELATIVE PERCENT: 67.8 % (ref 43–80)
PDW BLD-RTO: 14.4 FL (ref 11.5–15)
PLATELET # BLD: 214 E9/L (ref 130–450)
PMV BLD AUTO: 9.5 FL (ref 7–12)
POTASSIUM SERPL-SCNC: 3.8 MMOL/L (ref 3.5–5)
RBC # BLD: 2.93 E12/L (ref 3.5–5.5)
SODIUM BLD-SCNC: 143 MMOL/L (ref 132–146)
TOTAL PROTEIN: 5 G/DL (ref 6.4–8.3)
WBC # BLD: 10.1 E9/L (ref 4.5–11.5)

## 2018-07-02 PROCEDURE — 6370000000 HC RX 637 (ALT 250 FOR IP): Performed by: INTERNAL MEDICINE

## 2018-07-02 PROCEDURE — 3700000001 HC ADD 15 MINUTES (ANESTHESIA): Performed by: SURGERY

## 2018-07-02 PROCEDURE — 7100000001 HC PACU RECOVERY - ADDTL 15 MIN

## 2018-07-02 PROCEDURE — C9113 INJ PANTOPRAZOLE SODIUM, VIA: HCPCS | Performed by: INTERNAL MEDICINE

## 2018-07-02 PROCEDURE — 6360000002 HC RX W HCPCS: Performed by: FAMILY MEDICINE

## 2018-07-02 PROCEDURE — 86923 COMPATIBILITY TEST ELECTRIC: CPT

## 2018-07-02 PROCEDURE — 2000000000 HC ICU R&B

## 2018-07-02 PROCEDURE — 3600000014 HC SURGERY LEVEL 4 ADDTL 15MIN: Performed by: SURGERY

## 2018-07-02 PROCEDURE — 7100000000 HC PACU RECOVERY - FIRST 15 MIN

## 2018-07-02 PROCEDURE — 6360000002 HC RX W HCPCS: Performed by: INTERNAL MEDICINE

## 2018-07-02 PROCEDURE — 80053 COMPREHEN METABOLIC PANEL: CPT

## 2018-07-02 PROCEDURE — 2580000003 HC RX 258: Performed by: INTERNAL MEDICINE

## 2018-07-02 PROCEDURE — 2780000010 HC IMPLANT OTHER: Performed by: SURGERY

## 2018-07-02 PROCEDURE — 44205 LAP COLECTOMY PART W/ILEUM: CPT | Performed by: SURGERY

## 2018-07-02 PROCEDURE — 2580000003 HC RX 258: Performed by: NURSE ANESTHETIST, CERTIFIED REGISTERED

## 2018-07-02 PROCEDURE — 0D1B4Z4 BYPASS ILEUM TO CUTANEOUS, PERCUTANEOUS ENDOSCOPIC APPROACH: ICD-10-PCS | Performed by: SURGERY

## 2018-07-02 PROCEDURE — 85018 HEMOGLOBIN: CPT

## 2018-07-02 PROCEDURE — 3700000000 HC ANESTHESIA ATTENDED CARE: Performed by: SURGERY

## 2018-07-02 PROCEDURE — 0DTF4ZZ RESECTION OF RIGHT LARGE INTESTINE, PERCUTANEOUS ENDOSCOPIC APPROACH: ICD-10-PCS | Performed by: SURGERY

## 2018-07-02 PROCEDURE — P9046 ALBUMIN (HUMAN), 25%, 20 ML: HCPCS | Performed by: FAMILY MEDICINE

## 2018-07-02 PROCEDURE — 2709999900 HC NON-CHARGEABLE SUPPLY: Performed by: SURGERY

## 2018-07-02 PROCEDURE — 6370000000 HC RX 637 (ALT 250 FOR IP): Performed by: SURGERY

## 2018-07-02 PROCEDURE — 2720000010 HC SURG SUPPLY STERILE: Performed by: SURGERY

## 2018-07-02 PROCEDURE — 3600000004 HC SURGERY LEVEL 4 BASE: Performed by: SURGERY

## 2018-07-02 PROCEDURE — P9016 RBC LEUKOCYTES REDUCED: HCPCS

## 2018-07-02 PROCEDURE — 85025 COMPLETE CBC W/AUTO DIFF WBC: CPT

## 2018-07-02 PROCEDURE — 2500000003 HC RX 250 WO HCPCS: Performed by: SURGERY

## 2018-07-02 PROCEDURE — 2500000003 HC RX 250 WO HCPCS: Performed by: NURSE ANESTHETIST, CERTIFIED REGISTERED

## 2018-07-02 PROCEDURE — 36415 COLL VENOUS BLD VENIPUNCTURE: CPT

## 2018-07-02 PROCEDURE — 36592 COLLECT BLOOD FROM PICC: CPT

## 2018-07-02 PROCEDURE — 85014 HEMATOCRIT: CPT

## 2018-07-02 PROCEDURE — 6370000000 HC RX 637 (ALT 250 FOR IP): Performed by: SPECIALIST

## 2018-07-02 PROCEDURE — 88307 TISSUE EXAM BY PATHOLOGIST: CPT

## 2018-07-02 PROCEDURE — 6360000002 HC RX W HCPCS: Performed by: NURSE ANESTHETIST, CERTIFIED REGISTERED

## 2018-07-02 DEVICE — RELOAD STPL L60MM H1.5-3.6MM REG TISS BLU GRIPPING SURF B: Type: IMPLANTABLE DEVICE | Site: ABDOMEN | Status: FUNCTIONAL

## 2018-07-02 DEVICE — RELOAD STPL H4.1X2MM DIA60MM THCK TISS GRN 6 ROW PWR GST B: Type: IMPLANTABLE DEVICE | Site: ABDOMEN | Status: FUNCTIONAL

## 2018-07-02 RX ORDER — SODIUM CHLORIDE, SODIUM LACTATE, POTASSIUM CHLORIDE, CALCIUM CHLORIDE 600; 310; 30; 20 MG/100ML; MG/100ML; MG/100ML; MG/100ML
INJECTION, SOLUTION INTRAVENOUS CONTINUOUS PRN
Status: DISCONTINUED | OUTPATIENT
Start: 2018-07-02 | End: 2018-07-02 | Stop reason: SDUPTHER

## 2018-07-02 RX ORDER — SOTALOL HYDROCHLORIDE 80 MG/1
80 TABLET ORAL 2 TIMES DAILY
Status: DISCONTINUED | OUTPATIENT
Start: 2018-07-02 | End: 2018-07-05 | Stop reason: HOSPADM

## 2018-07-02 RX ORDER — MIDAZOLAM HYDROCHLORIDE 1 MG/ML
INJECTION INTRAMUSCULAR; INTRAVENOUS PRN
Status: DISCONTINUED | OUTPATIENT
Start: 2018-07-02 | End: 2018-07-02 | Stop reason: SDUPTHER

## 2018-07-02 RX ORDER — HYDROCODONE BITARTRATE AND ACETAMINOPHEN 5; 325 MG/1; MG/1
2 TABLET ORAL EVERY 4 HOURS PRN
Status: DISCONTINUED | OUTPATIENT
Start: 2018-07-02 | End: 2018-07-05 | Stop reason: HOSPADM

## 2018-07-02 RX ORDER — HYDROCODONE BITARTRATE AND ACETAMINOPHEN 5; 325 MG/1; MG/1
1 TABLET ORAL EVERY 4 HOURS PRN
Status: DISCONTINUED | OUTPATIENT
Start: 2018-07-02 | End: 2018-07-05 | Stop reason: HOSPADM

## 2018-07-02 RX ORDER — FENTANYL CITRATE 50 UG/ML
INJECTION, SOLUTION INTRAMUSCULAR; INTRAVENOUS PRN
Status: DISCONTINUED | OUTPATIENT
Start: 2018-07-02 | End: 2018-07-02 | Stop reason: SDUPTHER

## 2018-07-02 RX ORDER — 0.9 % SODIUM CHLORIDE 0.9 %
250 INTRAVENOUS SOLUTION INTRAVENOUS ONCE
Status: COMPLETED | OUTPATIENT
Start: 2018-07-02 | End: 2018-07-02

## 2018-07-02 RX ORDER — ONDANSETRON 2 MG/ML
INJECTION INTRAMUSCULAR; INTRAVENOUS PRN
Status: DISCONTINUED | OUTPATIENT
Start: 2018-07-02 | End: 2018-07-02 | Stop reason: SDUPTHER

## 2018-07-02 RX ORDER — MEPERIDINE HYDROCHLORIDE 50 MG/ML
INJECTION INTRAMUSCULAR; INTRAVENOUS; SUBCUTANEOUS PRN
Status: DISCONTINUED | OUTPATIENT
Start: 2018-07-02 | End: 2018-07-02 | Stop reason: SDUPTHER

## 2018-07-02 RX ORDER — ROCURONIUM BROMIDE 10 MG/ML
INJECTION, SOLUTION INTRAVENOUS PRN
Status: DISCONTINUED | OUTPATIENT
Start: 2018-07-02 | End: 2018-07-02 | Stop reason: SDUPTHER

## 2018-07-02 RX ORDER — ALBUMIN (HUMAN) 12.5 G/50ML
50 SOLUTION INTRAVENOUS EVERY 8 HOURS
Status: COMPLETED | OUTPATIENT
Start: 2018-07-02 | End: 2018-07-02

## 2018-07-02 RX ORDER — METOPROLOL TARTRATE 5 MG/5ML
5 INJECTION INTRAVENOUS ONCE
Status: DISCONTINUED | OUTPATIENT
Start: 2018-07-02 | End: 2018-07-02

## 2018-07-02 RX ORDER — PROPOFOL 10 MG/ML
INJECTION, EMULSION INTRAVENOUS PRN
Status: DISCONTINUED | OUTPATIENT
Start: 2018-07-02 | End: 2018-07-02 | Stop reason: SDUPTHER

## 2018-07-02 RX ORDER — DEXAMETHASONE SODIUM PHOSPHATE 10 MG/ML
INJECTION, SOLUTION INTRAMUSCULAR; INTRAVENOUS PRN
Status: DISCONTINUED | OUTPATIENT
Start: 2018-07-02 | End: 2018-07-02 | Stop reason: SDUPTHER

## 2018-07-02 RX ORDER — ALBUMIN (HUMAN) 12.5 G/50ML
SOLUTION INTRAVENOUS
Status: DISPENSED
Start: 2018-07-02 | End: 2018-07-03

## 2018-07-02 RX ORDER — BUPIVACAINE HYDROCHLORIDE AND EPINEPHRINE 2.5; 5 MG/ML; UG/ML
INJECTION, SOLUTION EPIDURAL; INFILTRATION; INTRACAUDAL; PERINEURAL PRN
Status: DISCONTINUED | OUTPATIENT
Start: 2018-07-02 | End: 2018-07-02 | Stop reason: HOSPADM

## 2018-07-02 RX ORDER — FLUCONAZOLE 2 MG/ML
200 INJECTION, SOLUTION INTRAVENOUS EVERY 24 HOURS
Status: DISCONTINUED | OUTPATIENT
Start: 2018-07-03 | End: 2018-07-04

## 2018-07-02 RX ORDER — LOSARTAN POTASSIUM 50 MG/1
50 TABLET ORAL ONCE
Status: COMPLETED | OUTPATIENT
Start: 2018-07-02 | End: 2018-07-02

## 2018-07-02 RX ORDER — LOSARTAN POTASSIUM 50 MG/1
50 TABLET ORAL DAILY
Status: DISCONTINUED | OUTPATIENT
Start: 2018-07-03 | End: 2018-07-03

## 2018-07-02 RX ORDER — PANTOPRAZOLE SODIUM 40 MG/10ML
40 INJECTION, POWDER, LYOPHILIZED, FOR SOLUTION INTRAVENOUS DAILY
Status: DISCONTINUED | OUTPATIENT
Start: 2018-07-02 | End: 2018-07-03

## 2018-07-02 RX ADMIN — SODIUM CHLORIDE, POTASSIUM CHLORIDE, SODIUM LACTATE AND CALCIUM CHLORIDE: 600; 310; 30; 20 INJECTION, SOLUTION INTRAVENOUS at 17:50

## 2018-07-02 RX ADMIN — MEPERIDINE HYDROCHLORIDE 30 MG: 50 INJECTION, SOLUTION INTRAMUSCULAR; INTRAVENOUS; SUBCUTANEOUS at 17:50

## 2018-07-02 RX ADMIN — SOTALOL HYDROCHLORIDE 80 MG: 80 TABLET ORAL at 23:43

## 2018-07-02 RX ADMIN — LOSARTAN POTASSIUM 50 MG: 50 TABLET ORAL at 22:25

## 2018-07-02 RX ADMIN — SOTALOL HYDROCHLORIDE 80 MG: 80 TABLET ORAL at 09:59

## 2018-07-02 RX ADMIN — Medication 40 MG: at 16:00

## 2018-07-02 RX ADMIN — ONDANSETRON 4 MG: 2 INJECTION INTRAMUSCULAR; INTRAVENOUS at 20:20

## 2018-07-02 RX ADMIN — METRONIDAZOLE 500 MG: 500 TABLET ORAL at 08:17

## 2018-07-02 RX ADMIN — PIPERACILLIN SODIUM AND TAZOBACTAM SODIUM 3.38 G: 3; .375 INJECTION, POWDER, LYOPHILIZED, FOR SOLUTION INTRAVENOUS at 09:59

## 2018-07-02 RX ADMIN — FENTANYL CITRATE 50 MCG: 50 INJECTION, SOLUTION INTRAMUSCULAR; INTRAVENOUS at 16:40

## 2018-07-02 RX ADMIN — ALBUMIN (HUMAN) 50 G: 0.25 INJECTION, SOLUTION INTRAVENOUS at 18:45

## 2018-07-02 RX ADMIN — FENTANYL CITRATE 100 MCG: 50 INJECTION, SOLUTION INTRAMUSCULAR; INTRAVENOUS at 16:00

## 2018-07-02 RX ADMIN — HYDROCODONE BITARTRATE AND ACETAMINOPHEN 2 TABLET: 5; 325 TABLET ORAL at 20:12

## 2018-07-02 RX ADMIN — DEXAMETHASONE SODIUM PHOSPHATE 10 MG: 10 INJECTION, SOLUTION INTRAMUSCULAR; INTRAVENOUS at 17:40

## 2018-07-02 RX ADMIN — MIDAZOLAM HYDROCHLORIDE 2 MG: 1 INJECTION INTRAMUSCULAR; INTRAVENOUS at 17:56

## 2018-07-02 RX ADMIN — LEVOFLOXACIN 500 MG: 500 TABLET, FILM COATED ORAL at 08:17

## 2018-07-02 RX ADMIN — MEPERIDINE HYDROCHLORIDE 20 MG: 50 INJECTION, SOLUTION INTRAMUSCULAR; INTRAVENOUS; SUBCUTANEOUS at 17:44

## 2018-07-02 RX ADMIN — Medication 10 ML: at 08:34

## 2018-07-02 RX ADMIN — ALBUMIN (HUMAN) 50 G: 0.25 INJECTION, SOLUTION INTRAVENOUS at 08:17

## 2018-07-02 RX ADMIN — Medication 10 ML: at 23:44

## 2018-07-02 RX ADMIN — METOPROLOL TARTRATE 50 MG: 50 TABLET ORAL at 05:24

## 2018-07-02 RX ADMIN — CHOLECALCIFEROL TAB 125 MCG (5000 UNIT) 5000 UNITS: 125 TAB at 09:59

## 2018-07-02 RX ADMIN — FLUCONAZOLE 200 MG: 100 TABLET ORAL at 08:54

## 2018-07-02 RX ADMIN — PROPOFOL 150 MG: 10 INJECTION, EMULSION INTRAVENOUS at 16:00

## 2018-07-02 RX ADMIN — Medication 10 MG: at 17:20

## 2018-07-02 RX ADMIN — PANTOPRAZOLE SODIUM 40 MG: 40 INJECTION, POWDER, FOR SOLUTION INTRAVENOUS at 09:59

## 2018-07-02 RX ADMIN — FENTANYL CITRATE 50 MCG: 50 INJECTION, SOLUTION INTRAMUSCULAR; INTRAVENOUS at 17:10

## 2018-07-02 RX ADMIN — ONDANSETRON 4 MG: 2 INJECTION, SOLUTION INTRAMUSCULAR; INTRAVENOUS at 17:40

## 2018-07-02 RX ADMIN — SODIUM CHLORIDE, POTASSIUM CHLORIDE, SODIUM LACTATE AND CALCIUM CHLORIDE: 600; 310; 30; 20 INJECTION, SOLUTION INTRAVENOUS at 15:47

## 2018-07-02 RX ADMIN — SODIUM CHLORIDE 250 ML: 9 INJECTION, SOLUTION INTRAVENOUS at 08:33

## 2018-07-02 RX ADMIN — PIPERACILLIN SODIUM AND TAZOBACTAM SODIUM 3.38 G: 3; .375 INJECTION, POWDER, LYOPHILIZED, FOR SOLUTION INTRAVENOUS at 18:42

## 2018-07-02 RX ADMIN — LEVOTHYROXINE SODIUM 50 MCG: 25 TABLET ORAL at 05:24

## 2018-07-02 RX ADMIN — ONDANSETRON 4 MG: 2 INJECTION INTRAMUSCULAR; INTRAVENOUS at 14:19

## 2018-07-02 RX ADMIN — PANTOPRAZOLE SODIUM 40 MG: 40 TABLET, DELAYED RELEASE ORAL at 05:24

## 2018-07-02 ASSESSMENT — PULMONARY FUNCTION TESTS
PIF_VALUE: 35
PIF_VALUE: 31
PIF_VALUE: 35
PIF_VALUE: 32
PIF_VALUE: 30
PIF_VALUE: 30
PIF_VALUE: 32
PIF_VALUE: 1
PIF_VALUE: 30
PIF_VALUE: 5
PIF_VALUE: 1
PIF_VALUE: 35
PIF_VALUE: 28
PIF_VALUE: 25
PIF_VALUE: 32
PIF_VALUE: 37
PIF_VALUE: 24
PIF_VALUE: 30
PIF_VALUE: 1
PIF_VALUE: 24
PIF_VALUE: 31
PIF_VALUE: 22
PIF_VALUE: 30
PIF_VALUE: 21
PIF_VALUE: 2
PIF_VALUE: 25
PIF_VALUE: 37
PIF_VALUE: 23
PIF_VALUE: 1
PIF_VALUE: 37
PIF_VALUE: 30
PIF_VALUE: 19
PIF_VALUE: 30
PIF_VALUE: 3
PIF_VALUE: 31
PIF_VALUE: 31
PIF_VALUE: 30
PIF_VALUE: 32
PIF_VALUE: 24
PIF_VALUE: 25
PIF_VALUE: 20
PIF_VALUE: 30
PIF_VALUE: 24
PIF_VALUE: 32
PIF_VALUE: 22
PIF_VALUE: 2
PIF_VALUE: 2
PIF_VALUE: 37
PIF_VALUE: 30
PIF_VALUE: 37
PIF_VALUE: 30
PIF_VALUE: 26
PIF_VALUE: 30
PIF_VALUE: 2
PIF_VALUE: 37
PIF_VALUE: 22
PIF_VALUE: 26
PIF_VALUE: 1
PIF_VALUE: 25
PIF_VALUE: 37
PIF_VALUE: 28
PIF_VALUE: 30
PIF_VALUE: 23
PIF_VALUE: 25
PIF_VALUE: 31
PIF_VALUE: 25
PIF_VALUE: 24
PIF_VALUE: 25
PIF_VALUE: 3
PIF_VALUE: 2
PIF_VALUE: 30
PIF_VALUE: 19
PIF_VALUE: 3
PIF_VALUE: 25
PIF_VALUE: 30
PIF_VALUE: 1
PIF_VALUE: 30
PIF_VALUE: 25
PIF_VALUE: 32
PIF_VALUE: 2
PIF_VALUE: 1
PIF_VALUE: 30
PIF_VALUE: 23
PIF_VALUE: 37
PIF_VALUE: 25
PIF_VALUE: 30
PIF_VALUE: 1
PIF_VALUE: 2
PIF_VALUE: 37
PIF_VALUE: 30
PIF_VALUE: 23
PIF_VALUE: 25
PIF_VALUE: 24
PIF_VALUE: 19
PIF_VALUE: 35
PIF_VALUE: 37
PIF_VALUE: 25
PIF_VALUE: 24
PIF_VALUE: 1
PIF_VALUE: 23
PIF_VALUE: 37
PIF_VALUE: 30
PIF_VALUE: 1
PIF_VALUE: 1
PIF_VALUE: 25
PIF_VALUE: 35
PIF_VALUE: 25
PIF_VALUE: 30
PIF_VALUE: 25
PIF_VALUE: 1
PIF_VALUE: 25
PIF_VALUE: 24
PIF_VALUE: 22
PIF_VALUE: 2
PIF_VALUE: 30
PIF_VALUE: 30
PIF_VALUE: 23
PIF_VALUE: 32
PIF_VALUE: 2
PIF_VALUE: 37
PIF_VALUE: 24
PIF_VALUE: 37
PIF_VALUE: 22
PIF_VALUE: 30
PIF_VALUE: 41
PIF_VALUE: 37
PIF_VALUE: 30
PIF_VALUE: 24
PIF_VALUE: 25
PIF_VALUE: 37
PIF_VALUE: 2
PIF_VALUE: 32
PIF_VALUE: 37
PIF_VALUE: 25
PIF_VALUE: 30
PIF_VALUE: 32
PIF_VALUE: 35

## 2018-07-02 ASSESSMENT — PAIN SCALES - GENERAL
PAINLEVEL_OUTOF10: 0
PAINLEVEL_OUTOF10: 9
PAINLEVEL_OUTOF10: 9

## 2018-07-02 NOTE — BRIEF OP NOTE
Brief Postoperative Note  ______________________________________________________________    Patient: Rachel Cannon  YOB: 1945  MRN: [id number]  Date of Procedure: 7/2/2018    Pre-Op Diagnosis: Lower GI hemorrhage, pandiverticulosis    Post-Op Diagnosis: Same       Procedure(s):  LAPAROSCOPIC SUBTOTAL COLECTOMY POSS ILEOSTOMY    Anesthesia: Anesthesia type not filed in the log. Surgeon(s):  Elizabeth Galvez MD    Staff:  Surgical Assistant: Stephanie Garcia  Scrub Person First: [facility]     Estimated Blood Loss: 50 mL    Complications: None    Specimens:   ID Type Source Tests Collected by Time Destination   A : COLON Tissue Abdomen SURGICAL PATHOLOGY Elizabeth Galvez MD 7/2/2018 7340        Implants:    Implant Name Type Inv.  Item Serial No.  Lot No. LRB No. Used   RELOAD ECHELON BLUE 60MM Fastener RELOAD ECHELON BLUE 60MM  [employer]  N/A 2   RELOAD ECHELON GRN 60MM Fastener RELOAD ECHEL[employer] GRN 60MM   [employer]   N/A 1         Drains:   Colostomy RLQ (Active)       Urethral Catheter Non-latex 16 fr (Active)       Findings: see op note  [id number]  Elizabeth Galvez MD  Date: 7/2/2018  Time: 5:56 PM

## 2018-07-02 NOTE — PROGRESS NOTES
Patient ID:  Alirio Montano  [id number]  62 y.o.  1945    HPI:  Patient voiced no new complaints since hospital admission. Questions, answers, and tests reviewed. ROS:  Cardiovascular:   Denies any chest pain, irregular heartbeats, or palpitations. Respiratory:   Denies shortness of breath, coughing, sputum production, hemoptysis, or wheezing. Gastrointestinal:   Denies nausea, vomiting, diarrhea, or constipation. Denies any abdominal pain. Extremities:   Denies any lower extremity swelling or edema. Neurology:    Denies any headache or focal neurological deficits. No weakness or paresthesia. Derm:    Denies any rashes, ulcers, or excoriations. Denies bruising. Genitourinary:    Denies any urgency, frequency, hematuria. Voiding without difficulty. Physical Exam:    Vitals:    07/02/18 0800   BP: 132/74   Pulse: 71   Resp: 23   Temp: 97.7 °F (36.5 °C)   SpO2: 98%       HEENT:  PERRLA. EOMI. Sclera clear. Buccal mucosa moist.    Neck:  Supple. Trachea midline. No thyromegaly. No JVD. No bruits. Heart:  Rhythm regular, rate controlled. No murmurs. Lungs:  Symmetrical. Clear to auscultation bilaterally. No wheezes. No rhonchi. No rales. Abdomen: Soft. Non-tender. Non-distended. Bowel sounds positive. No organomegaly or masses. No pain on palpation    Extremities:  Peripheral pulses present. No peripheral edema. No ulcers. Neurologic:  Alert x 3. No focal deficit. Cranial nerves grossly intact. Skin:  No petechia. No hemorrhage. No wounds.     Labs:  CBC: Lab Results   Component Value Date    WBC 10.1 07/02/2018    RBC 2.93 07/02/2018    HGB 8.7 07/02/2018    HCT 26.5 07/02/2018    MCV 90.4 07/02/2018    MCH 29.7 07/02/2018    MCHC 32.8 07/02/2018    RDW 14.4 07/02/2018     07/02/2018    MPV 9.5 07/02/2018     CMP:  Lab Results   Component Value Date     07/02/2018    K 3.8 07/02/2018     07/02/2018    CO2 27 07/02/2018    BUN 13 07/02/2018    CREATININE 0.9 07/02/2018    GFRAA >60 07/02/2018    LABGLOM >60 07/02/2018    GLUCOSE 129 07/02/2018    GLUCOSE 87 03/22/2012    PROT 5.0 07/02/2018    LABALBU 2.7 07/02/2018    LABALBU 4.2 03/22/2012    CALCIUM 8.5 07/02/2018    BILITOT 0.3 07/02/2018    ALKPHOS 52 07/02/2018    AST 15 07/02/2018    ALT 11 07/02/2018     PT/INR:    Lab Results   Component Value Date    PROTIME 12.7 06/28/2018    INR 1.1 06/28/2018         RADIOLOGY REPORT   Final Result      IR ULTRASOUND GUIDANCE VASCULAR ACCESS   Final Result   Ultrasound guidance was provided during placement of a   PICC line. IR FLUORO GUIDED CVA DEVICE PLACEMENT   Final Result   Successful placement of a 5 [place] double-lumen Power   PICC line using ultrasound guidance via the right basilic  vein. CT ABDOMEN PELVIS W IV CONTRAST Additional Contrast? None   Final Result   1. New surgical clips within the right colon with a few tiny adjacent   foci of air, possibly related to microperforation. 2. Scattered colonic diverticula without evidence of acute   diverticulitis. 3. Diffuse fatty infiltration of the liver. Findings were discussed with Dr. Luis Knapp on 6/27/2018 at 5:45   PM.      NM GI BLOOD LOSS   Final Result   Addendum 1 of 1   Addendum: Dr. Val Woods notified of this finding via telephone   conversation at 9:45 AM. Active GI bleeding appears to be in the   region of the proximal colon near the hepatic flexure. Final   Active gastrointestinal hemorrhage proximal colon. THIS IS AN ABNORMAL REPORT. PLEASE TAKE APPROPRIATE MEASURES. .         CT ABDOMEN PELVIS W IV CONTRAST Additional Contrast? None   Final Result   1. No evidence of urinary or bowel obstruction. 2. Abnormal appearance the distal colon with diverticula, mild mural   thickening and mild pericolonic stranding. Small nodules are noted   within the mesentery which could be focally enlarged lymph nodes range   up to about 12 mm in diameter.  Differential includes low-grade   diverticulitis or malignancy. Final report differs from that generated by Benny Verduzco. These findings were   communicated to radiology department personnel by critical results   form for further communication to the requesting physician at the   conclusion of this examination.              Other Data:      Intake/Output Summary (Last 24 hours) at 07/02/18 0915  Last data filed at 07/01/18 2051   Gross per 24 hour   Intake           1302.5 ml   Output                0 ml   Net           1302.5 ml         Scheduled Medications:   metoprolol  5 mg Intravenous Once    sodium chloride  250 mL Intravenous Once    albumin human  50 g Intravenous Q8H    sotalol  80 mg Oral BID    vitamin D3  5,000 Units Oral Daily    pantoprazole  40 mg Oral QAM AC    levofloxacin  500 mg Oral Daily    metroNIDAZOLE  500 mg Oral 2 times per day    fluconazole  200 mg Oral Daily    levothyroxine  50 mcg Oral Daily    sodium chloride (PF)  10 mL Intravenous Daily    sodium chloride (PF)  10 mL Intravenous 2 times per day         Infusion Medications:   dextrose Stopped (07/01/18 0024)       Assessment:   Patient Active Problem List    Diagnosis Date Noted    Paroxysmal atrial fibrillation with rapid ventricular response ([facility] [address] 06/29/2018    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 06/27/2018    GI bleed 06/26/2018    Diverticulitis of intestine with bleeding 06/26/2018    Anemia due to blood loss, acute 06/26/2018    Essential hypertension 06/26/2018    Acquired hypothyroidism 06/26/2018    Osteoarthritis of multiple joints 06/26/2018    Essential hypertension 05/11/2017    Acquired hypothyroidism 05/11/2017    Class 2 obesity in adult 05/11/2017    Osteoarthritis of multiple joints 05/11/2017         Plan:Recurrent lower GI diverticular bleed, proximal atrial fibrillation, recurrent lower GI bleed, transferred to ICU, case discussed with cardiologist, patient being started on sotalol

## 2018-07-02 NOTE — ANESTHESIA PRE PROCEDURE
Department of Anesthesiology  Preprocedure Note       Name:  Epi Camacho   Age:  68 y.o.  :  1945                                          MRN:  [id number]         Date:  2018      Surgeon: Sandy Franco):  Mirella Salinas MD    Procedure: Procedure(s):  LAPAROSCOPIC POSS OPEN SUBTOTAL COLECTOMY POSS ILEOSTOMY    Medications prior to admission:   Prior to Admission medications    Medication Sig Start Date End Date Taking?  Authorizing Provider   metoprolol tartrate (LOPRESSOR) 25 MG tablet Take 25 mg by mouth 2 times daily   Yes Historical Provider, MD   traMADol (ULTRAM) 50 MG tablet Take 50 mg by mouth every 6 hours as needed for Pain   Yes Historical Provider, MD   losartan-hydrochlorothiazide (HYZAAR) 50-12.5 MG per tablet Take 1 tablet by mouth daily   Yes Historical Provider, MD   celecoxib (CELEBREX) 200 MG capsule Take 200 mg by mouth nightly    Yes Historical Provider, MD   levothyroxine (SYNTHROID) 50 MCG tablet Take 50 mcg by mouth Daily   Yes Historical Provider, MD   aspirin 325 MG EC tablet Take 1 tablet by mouth 2 times daily 17   Trevon Parra DO   Misc Natural Products (GLUCOSAMINE CHONDROITIN ADV PO) Take 1 tablet by mouth daily    Historical Provider, MD   vitamin D-3 (CHOLECALCIFEROL) 5000 UNITS TABS Take 5,000 Units by mouth daily    Historical Provider, MD   aspirin 81 MG chewable tablet Take 81 mg by mouth daily    Historical Provider, MD       Current medications:    Current Facility-Administered Medications   Medication Dose Route Frequency Provider Last Rate Last Dose    0.9 % sodium chloride bolus  250 mL Intravenous Once Ben Mays MD 20 mL/hr at 18 0833 250 mL at 18 0833    albumin human 25 % solution 50 g  50 g Intravenous Q8H Latasha Rosario DO   50 g at 18 0817    sotalol (BETAPACE) tablet 80 mg  80 mg Oral BID Travis Rowell MD   80 mg at 18 0959    piperacillin-tazobactam (ZOSYN) 3.375 g in dextrose 5 % 50 mL IVPB extended Anesthesia Plan      general     ASA 3       Induction: intravenous. MIPS: Postoperative opioids intended and Prophylactic antiemetics administered. Anesthetic plan and risks discussed with patient. Plan discussed with CRNA. DOS STAFF ADDENDUM:    Pt seen and examined, chart reviewed (including anesthesia, drug and allergy history). Anesthetic plan, risks, benefits, alternatives, and personnel involved discussed with patient. Patient verbalized an understanding and agrees to proceed. Plan discussed with care team members and agreed upon.     Robinson Izquierdo MD  Staff Anesthesiologist  3:37 PM    Robinson Izquierdo MD   7/2/2018

## 2018-07-02 NOTE — PROGRESS NOTES
PROGRESS NOTE  By Rachel Gamboa M.D. [facility]  Dr. Don Pearson M.D.,  Dr. Ori Salter M.D.,   SERENA MirandaO.,  Dr. Selina Hernandez M.D.,  Dr Otilio Galeano D.O. Debra WOLFE Cherol  68 y.o.  female    SUBJECTIVE:  Complaints of mild lower abdominal discomfort but no pain. Patient reports that she redeveloped rectal bleed yesterday - according to nursing from the floor she was found to be in A. fib with RVR. Currently patient denies chest pain, palpitations or tachycardia. She continues to complain of rectal bleeding. OBJECTIVE:    BP (!) 153/98   Pulse 78   Temp 98.2 °F (36.8 °C) (Oral)   Resp 21   Ht 5' 4\" (1.626 m)   Wt 245 lb (111.1 kg)   SpO2 100%   BMI 42.05 kg/m²     General:AAOx3/NAD/ female  HEENT: Anicteric sclerae/moist oromucosa  Neck: Supple with trachea midline  Chest: Symmetrical excursions/respirations   Cor: Tachycardic   Abd.:obese and soft. Appears nontender. No rebound tenderness or guarding.  BS +  Extr.: Decreased muscle tone and bulk throughout, consistent with age and condition  Skin: Warm and dry/anicteric        DATA:       Lab Results   Component Value Date    WBC 10.1 07/02/2018    RBC 2.93 07/02/2018    HGB 8.7 07/02/2018    HCT 26.5 07/02/2018    MCV 90.4 07/02/2018    MCH 29.7 07/02/2018    MCHC 32.8 07/02/2018    RDW 14.4 07/02/2018     07/02/2018    MPV 9.5 07/02/2018     Lab Results   Component Value Date     07/02/2018    K 3.8 07/02/2018     07/02/2018    CO2 27 07/02/2018    BUN 13 07/02/2018    CREATININE 0.9 07/02/2018    CALCIUM 8.5 07/02/2018    PROT 5.0 07/02/2018    LABALBU 2.7 07/02/2018    LABALBU 4.2 03/22/2012    BILITOT 0.3 07/02/2018    ALKPHOS 52 07/02/2018    AST 15 07/02/2018    ALT 11 07/02/2018     No results found for: LIPASE  No results found for: AMYLASE      ASSESSMENT/PLAN:   Acute blood loss anemia -  Hematochezia secondary to diverticular bleeding - status post colonoscopy hemostasis obtained with injection of epinephrine and clips on 6/27/18 - for details, please, refer to pertinent procedure note by Dr. Regine Melvin. Apparently yesterday patient developed recurrent hematochezia associated with drop in H&H.  Patient is currently being transfused with plan to proceed with surgical intervention    Jia Nava MD  7/2/2018  11:38 AM

## 2018-07-02 NOTE — CONSULTS
Oral 2 times per day    fluconazole  200 mg Oral Daily    levothyroxine  50 mcg Oral Daily    sodium chloride (PF)  10 mL Intravenous Daily    sodium chloride (PF)  10 mL Intravenous 2 times per day       OUTPT MEDS  Current Discharge Medication List      CONTINUE these medications which have NOT CHANGED    Details   metoprolol tartrate (LOPRESSOR) 25 MG tablet Take 25 mg by mouth 2 times daily      traMADol (ULTRAM) 50 MG tablet Take 50 mg by mouth every 6 hours as needed for Pain      losartan-hydrochlorothiazide (HYZAAR) 50-12.5 MG per tablet Take 1 tablet by mouth daily      celecoxib (CELEBREX) 200 MG capsule Take 200 mg by mouth nightly       levothyroxine (SYNTHROID) 50 MCG tablet Take 50 mcg by mouth Daily      aspirin 325 MG EC tablet Take 1 tablet by mouth 2 times daily  Qty: 30 tablet, Refills: 0      Misc Natural Products (GLUCOSAMINE CHONDROITIN ADV PO) Take 1 tablet by mouth daily      vitamin D-3 (CHOLECALCIFEROL) 5000 UNITS TABS Take 5,000 Units by mouth daily      aspirin 81 MG chewable tablet Take 81 mg by mouth daily             REVIEW OF SYSTEMS:    No high-grade fever. No constitutional symptoms. No weight gain or weight loss. Some nausea, bloody diarrhea. She had abdominal pain  No cough, sputum, or wheezing. Hematochezia. No hemoptysis, hematemesis, or hematuria. No excessive bruises or tendency to bleed. No urinary symptoms. No anxiety. No depression. No vision loss. No hearing loss. No focal neurological deficits. No seizure activity. No skin rash. Intermittent arthritis symptoms. Rest of review of systems is negative or noncontributory. PHYSICAL EXAM:      /74   Pulse 71   Temp 97.7 °F (36.5 °C) (Oral)   Resp 23   Ht 5' 4\" (1.626 m)   Wt 245 lb (111.1 kg)   SpO2 98%   BMI 42.05 kg/m²    General: Alert and oriented, No acute distress. Appears as stated age. Eye:  Pupils are equal, round and reactive to light, Extraocular movements are intact, Normal conjunctiva. with diverticula, mild mural thickening and mild pericolonic stranding. Small nodules are noted within the mesentery which could be focally enlarged lymph nodes range up to about 12 mm in diameter. Differential includes low-grade diverticulitis or malignancy. Final report differs from that generated by Amanda Millan. These findings were communicated to radiology department personnel by critical results form for further communication to the requesting physician at the conclusion of this examination. Ir Aamir Carbajal Device Placement    Result Date: 6/28/2018  Location:200 Exam: IR FLUORO GUIDED CVA DEVICE PLACEMENT HISTORY:   GI bleed FLUOROSCOPY TIME:  0.2 minutes PROCEDURE:  The procedure was explained to the patient and informed consent was obtained. The skin over the  right arm was prepped and draped in a sterile fashion and then anesthetized with Lidocaine. Maximal sterile barrier technique was utilized. Under ultrasound guidance, the basilic vein was localized. Using a micropuncture needle, the vein was entered under direct ultrasound visualization. A 0.018 guidewire was advanced into the central venous circulation. A 5 Croatian peel-away sheath was placed. The 5 Croatian Power PICC line catheter was trimmed to 41 cm was then advanced through the peel-away sheath and positioned at the right atrial/SVC junction. Fluoroscopic spot film was obtained. FINDINGS:  Ultrasound shows the veins to be patent. Fluoroscopic spot film demonstrates the Power PICC line to be at the right atrial/SVC junction. Successful placement of a 5 Croatian double-lumen Power PICC line using ultrasound guidance via the right basilic  vein. Ir Ultrasound Guidance Vascular Access    Result Date: 6/28/2018  Location:200 Exam: IR ULTRASOUND GUIDANCE VASCULAR ACCESS History:  PICC line placement Ultrasound evaluation of potential access was performed.   After successfully identifying a patent right basilic vein, and following the administration of lidocaine, real-time ultrasound guidance was used to puncture the right basilic   vein. A permanent recording was created for the patient's record. Ultrasound guidance was provided during placement of a PICC line. IMPRESSION:    Acute GI bleed. Diverticulitis. Proxysmal atrial fibrillation with fast ventricular response. Chronic hypertension. Arthritis. Anemia. Plan of management:    I started patient on sotalol to decrease the frequency of her paroxysmal atrial fibrillation. She is going to need long-term anticoagulation with Eliquis are Coumadin after a week or 2 once there is no evidence of further GI bleed as long as it is considered safe by a gastroenterologist.  2-D echo Doppler. **This report was transcribed using voice recognition software. Every effort was made to ensure accuracy; however, inadvertent computerized transcription errors may be present.                   Latisha Chapa MD, [facility]

## 2018-07-03 LAB
ALBUMIN SERPL-MCNC: 3.8 G/DL (ref 3.5–5.2)
ALP BLD-CCNC: 49 U/L (ref 35–104)
ALT SERPL-CCNC: 25 U/L (ref 0–32)
ANION GAP SERPL CALCULATED.3IONS-SCNC: 13 MMOL/L (ref 7–16)
AST SERPL-CCNC: 34 U/L (ref 0–31)
BACTERIA: NORMAL /HPF
BASOPHILS ABSOLUTE: 0.04 E9/L (ref 0–0.2)
BASOPHILS RELATIVE PERCENT: 0.3 % (ref 0–2)
BILIRUB SERPL-MCNC: 0.6 MG/DL (ref 0–1.2)
BILIRUBIN URINE: NEGATIVE
BLOOD, URINE: ABNORMAL
BUN BLDV-MCNC: 18 MG/DL (ref 8–23)
CALCIUM SERPL-MCNC: 8.9 MG/DL (ref 8.6–10.2)
CHLORIDE BLD-SCNC: 105 MMOL/L (ref 98–107)
CLARITY: CLEAR
CO2: 25 MMOL/L (ref 22–29)
COLOR: YELLOW
CREAT SERPL-MCNC: 1 MG/DL (ref 0.5–1)
EKG ATRIAL RATE: 120 BPM
EKG ATRIAL RATE: 83 BPM
EKG P AXIS: 48 DEGREES
EKG P-R INTERVAL: 158 MS
EKG Q-T INTERVAL: 280 MS
EKG Q-T INTERVAL: 386 MS
EKG QRS DURATION: 94 MS
EKG QRS DURATION: 98 MS
EKG QTC CALCULATION (BAZETT): 413 MS
EKG QTC CALCULATION (BAZETT): 453 MS
EKG R AXIS: 26 DEGREES
EKG R AXIS: 35 DEGREES
EKG T AXIS: 155 DEGREES
EKG T AXIS: 35 DEGREES
EKG VENTRICULAR RATE: 131 BPM
EKG VENTRICULAR RATE: 83 BPM
EOSINOPHILS ABSOLUTE: 0 E9/L (ref 0.05–0.5)
EOSINOPHILS RELATIVE PERCENT: 0 % (ref 0–6)
GFR AFRICAN AMERICAN: >60
GFR NON-AFRICAN AMERICAN: 54 ML/MIN/1.73
GLUCOSE BLD-MCNC: 170 MG/DL (ref 74–109)
GLUCOSE URINE: NEGATIVE MG/DL
HCT VFR BLD CALC: 30.1 % (ref 34–48)
HEMOGLOBIN: 9.9 G/DL (ref 11.5–15.5)
IMMATURE GRANULOCYTES #: 0.15 E9/L
IMMATURE GRANULOCYTES %: 1 % (ref 0–5)
KETONES, URINE: NEGATIVE MG/DL
LEUKOCYTE ESTERASE, URINE: NEGATIVE
LYMPHOCYTES ABSOLUTE: 0.65 E9/L (ref 1.5–4)
LYMPHOCYTES RELATIVE PERCENT: 4.3 % (ref 20–42)
MCH RBC QN AUTO: 29.6 PG (ref 26–35)
MCHC RBC AUTO-ENTMCNC: 32.9 % (ref 32–34.5)
MCV RBC AUTO: 90.1 FL (ref 80–99.9)
MONOCYTES ABSOLUTE: 0.61 E9/L (ref 0.1–0.95)
MONOCYTES RELATIVE PERCENT: 4 % (ref 2–12)
NEUTROPHILS ABSOLUTE: 13.65 E9/L (ref 1.8–7.3)
NEUTROPHILS RELATIVE PERCENT: 90.4 % (ref 43–80)
NITRITE, URINE: NEGATIVE
PDW BLD-RTO: 15.1 FL (ref 11.5–15)
PH UA: 6 (ref 5–9)
PLATELET # BLD: 198 E9/L (ref 130–450)
PMV BLD AUTO: 9.5 FL (ref 7–12)
POTASSIUM SERPL-SCNC: 4.4 MMOL/L (ref 3.5–5)
PROTEIN UA: ABNORMAL MG/DL
RBC # BLD: 3.34 E12/L (ref 3.5–5.5)
RBC UA: NORMAL /HPF (ref 0–2)
SODIUM BLD-SCNC: 143 MMOL/L (ref 132–146)
SPECIFIC GRAVITY UA: >=1.03 (ref 1–1.03)
TOTAL PROTEIN: 5.9 G/DL (ref 6.4–8.3)
UROBILINOGEN, URINE: 0.2 E.U./DL
WBC # BLD: 15.1 E9/L (ref 4.5–11.5)
WBC UA: NORMAL /HPF (ref 0–5)

## 2018-07-03 PROCEDURE — 2580000003 HC RX 258: Performed by: STUDENT IN AN ORGANIZED HEALTH CARE EDUCATION/TRAINING PROGRAM

## 2018-07-03 PROCEDURE — 80053 COMPREHEN METABOLIC PANEL: CPT

## 2018-07-03 PROCEDURE — 6370000000 HC RX 637 (ALT 250 FOR IP): Performed by: INTERNAL MEDICINE

## 2018-07-03 PROCEDURE — 6370000000 HC RX 637 (ALT 250 FOR IP): Performed by: SURGERY

## 2018-07-03 PROCEDURE — G8988 SELF CARE GOAL STATUS: HCPCS

## 2018-07-03 PROCEDURE — 97165 OT EVAL LOW COMPLEX 30 MIN: CPT

## 2018-07-03 PROCEDURE — 87088 URINE BACTERIA CULTURE: CPT

## 2018-07-03 PROCEDURE — 2580000003 HC RX 258: Performed by: INTERNAL MEDICINE

## 2018-07-03 PROCEDURE — G8978 MOBILITY CURRENT STATUS: HCPCS | Performed by: PHYSICAL THERAPIST

## 2018-07-03 PROCEDURE — 36415 COLL VENOUS BLD VENIPUNCTURE: CPT

## 2018-07-03 PROCEDURE — 6360000002 HC RX W HCPCS: Performed by: INTERNAL MEDICINE

## 2018-07-03 PROCEDURE — 6360000002 HC RX W HCPCS: Performed by: STUDENT IN AN ORGANIZED HEALTH CARE EDUCATION/TRAINING PROGRAM

## 2018-07-03 PROCEDURE — G8987 SELF CARE CURRENT STATUS: HCPCS

## 2018-07-03 PROCEDURE — 85025 COMPLETE CBC W/AUTO DIFF WBC: CPT

## 2018-07-03 PROCEDURE — 97161 PT EVAL LOW COMPLEX 20 MIN: CPT | Performed by: PHYSICAL THERAPIST

## 2018-07-03 PROCEDURE — 81001 URINALYSIS AUTO W/SCOPE: CPT

## 2018-07-03 PROCEDURE — 97530 THERAPEUTIC ACTIVITIES: CPT | Performed by: PHYSICAL THERAPIST

## 2018-07-03 PROCEDURE — 1200000000 HC SEMI PRIVATE

## 2018-07-03 PROCEDURE — 97530 THERAPEUTIC ACTIVITIES: CPT

## 2018-07-03 PROCEDURE — G8979 MOBILITY GOAL STATUS: HCPCS | Performed by: PHYSICAL THERAPIST

## 2018-07-03 PROCEDURE — 6360000002 HC RX W HCPCS: Performed by: SPECIALIST

## 2018-07-03 RX ORDER — SODIUM CHLORIDE 9 MG/ML
INJECTION, SOLUTION INTRAVENOUS CONTINUOUS
Status: DISCONTINUED | OUTPATIENT
Start: 2018-07-03 | End: 2018-07-03

## 2018-07-03 RX ORDER — LOSARTAN POTASSIUM AND HYDROCHLOROTHIAZIDE 12.5; 5 MG/1; MG/1
1 TABLET ORAL DAILY
Status: DISCONTINUED | OUTPATIENT
Start: 2018-07-03 | End: 2018-07-03 | Stop reason: CLARIF

## 2018-07-03 RX ORDER — KETOROLAC TROMETHAMINE 15 MG/ML
15 INJECTION, SOLUTION INTRAMUSCULAR; INTRAVENOUS EVERY 6 HOURS PRN
Status: DISCONTINUED | OUTPATIENT
Start: 2018-07-03 | End: 2018-07-03

## 2018-07-03 RX ORDER — PANTOPRAZOLE SODIUM 40 MG/1
40 TABLET, DELAYED RELEASE ORAL
Status: DISCONTINUED | OUTPATIENT
Start: 2018-07-03 | End: 2018-07-05 | Stop reason: HOSPADM

## 2018-07-03 RX ORDER — HYDROCHLOROTHIAZIDE 12.5 MG/1
12.5 TABLET ORAL DAILY
Status: DISCONTINUED | OUTPATIENT
Start: 2018-07-03 | End: 2018-07-05 | Stop reason: HOSPADM

## 2018-07-03 RX ORDER — LOSARTAN POTASSIUM 50 MG/1
50 TABLET ORAL DAILY
Status: DISCONTINUED | OUTPATIENT
Start: 2018-07-03 | End: 2018-07-05 | Stop reason: HOSPADM

## 2018-07-03 RX ADMIN — KETOROLAC TROMETHAMINE 15 MG: 15 INJECTION, SOLUTION INTRAMUSCULAR; INTRAVENOUS at 05:37

## 2018-07-03 RX ADMIN — PANTOPRAZOLE SODIUM 40 MG: 40 TABLET, DELAYED RELEASE ORAL at 08:22

## 2018-07-03 RX ADMIN — HYDROCHLOROTHIAZIDE 12.5 MG: 12.5 TABLET ORAL at 08:14

## 2018-07-03 RX ADMIN — Medication 10 ML: at 20:06

## 2018-07-03 RX ADMIN — FLUCONAZOLE 200 MG: 200 INJECTION, SOLUTION INTRAVENOUS at 08:02

## 2018-07-03 RX ADMIN — CHOLECALCIFEROL TAB 125 MCG (5000 UNIT) 5000 UNITS: 125 TAB at 09:55

## 2018-07-03 RX ADMIN — SODIUM CHLORIDE: 9 INJECTION, SOLUTION INTRAVENOUS at 05:37

## 2018-07-03 RX ADMIN — PIPERACILLIN SODIUM AND TAZOBACTAM SODIUM 3.38 G: 3; .375 INJECTION, POWDER, LYOPHILIZED, FOR SOLUTION INTRAVENOUS at 03:46

## 2018-07-03 RX ADMIN — HYDROCODONE BITARTRATE AND ACETAMINOPHEN 2 TABLET: 5; 325 TABLET ORAL at 17:25

## 2018-07-03 RX ADMIN — HYDROCODONE BITARTRATE AND ACETAMINOPHEN 2 TABLET: 5; 325 TABLET ORAL at 08:13

## 2018-07-03 RX ADMIN — PIPERACILLIN SODIUM AND TAZOBACTAM SODIUM 3.38 G: 3; .375 INJECTION, POWDER, LYOPHILIZED, FOR SOLUTION INTRAVENOUS at 12:00

## 2018-07-03 RX ADMIN — SOTALOL HYDROCHLORIDE 80 MG: 80 TABLET ORAL at 09:55

## 2018-07-03 RX ADMIN — HYDROCODONE BITARTRATE AND ACETAMINOPHEN 2 TABLET: 5; 325 TABLET ORAL at 21:35

## 2018-07-03 RX ADMIN — Medication 10 ML: at 08:15

## 2018-07-03 RX ADMIN — LOSARTAN POTASSIUM 50 MG: 50 TABLET ORAL at 08:15

## 2018-07-03 RX ADMIN — LEVOTHYROXINE SODIUM 50 MCG: 25 TABLET ORAL at 05:37

## 2018-07-03 RX ADMIN — HYDROCODONE BITARTRATE AND ACETAMINOPHEN 2 TABLET: 5; 325 TABLET ORAL at 01:50

## 2018-07-03 RX ADMIN — Medication 10 ML: at 08:14

## 2018-07-03 RX ADMIN — PIPERACILLIN SODIUM AND TAZOBACTAM SODIUM 3.38 G: 3; .375 INJECTION, POWDER, LYOPHILIZED, FOR SOLUTION INTRAVENOUS at 20:06

## 2018-07-03 RX ADMIN — SOTALOL HYDROCHLORIDE 80 MG: 80 TABLET ORAL at 20:57

## 2018-07-03 ASSESSMENT — PAIN DESCRIPTION - ORIENTATION
ORIENTATION: MID;LOWER
ORIENTATION: LOWER;MID
ORIENTATION: MID

## 2018-07-03 ASSESSMENT — PAIN SCALES - GENERAL
PAINLEVEL_OUTOF10: 8
PAINLEVEL_OUTOF10: 1
PAINLEVEL_OUTOF10: 0
PAINLEVEL_OUTOF10: 7
PAINLEVEL_OUTOF10: 0
PAINLEVEL_OUTOF10: 7
PAINLEVEL_OUTOF10: 0
PAINLEVEL_OUTOF10: 6
PAINLEVEL_OUTOF10: 8

## 2018-07-03 ASSESSMENT — PAIN DESCRIPTION - LOCATION
LOCATION: ABDOMEN

## 2018-07-03 ASSESSMENT — PAIN DESCRIPTION - DESCRIPTORS
DESCRIPTORS: CRAMPING;DISCOMFORT
DESCRIPTORS: ACHING;CRAMPING;DISCOMFORT
DESCRIPTORS: ACHING;DISCOMFORT;DULL

## 2018-07-03 ASSESSMENT — PAIN DESCRIPTION - FREQUENCY
FREQUENCY: CONTINUOUS
FREQUENCY: INTERMITTENT
FREQUENCY: INTERMITTENT

## 2018-07-03 ASSESSMENT — PAIN DESCRIPTION - PAIN TYPE
TYPE: SURGICAL PAIN
TYPE: ACUTE PAIN
TYPE: SURGICAL PAIN

## 2018-07-03 ASSESSMENT — PAIN DESCRIPTION - ONSET
ONSET: ON-GOING
ONSET: SUDDEN
ONSET: ON-GOING

## 2018-07-03 ASSESSMENT — PAIN DESCRIPTION - PROGRESSION: CLINICAL_PROGRESSION: NOT CHANGED

## 2018-07-03 NOTE — PROGRESS NOTES
Report called to [facility]. Patient admission event noted, labs, vitals and overall condition reviewed. Will put in for transport.

## 2018-07-03 NOTE — OP NOTE
[address]                                 OPERATIVE REPORT    PATIENT NAME: Marisa Bonilla                    :        1945  MED REC NO:   [id number]                            ROOM:       IC06  ACCOUNT NO:   [de-identified]                           ADMIT DATE: 2018  PROVIDER:     Pat Cedeno MD    DATE OF PROCEDURE:  2018    PREOPERATIVE DIAGNOSES:  GI hemorrhage, pandiverticulosis. POSTOPERATIVE DIAGNOSES:  GI hemorrhage, pandiverticulosis. PROCEDURE PERFORMED:  Laparoscopic subtotal colectomy with end ileostomy. ANESTHESIA:  General endotracheal.    SURGEON:  Pat Cedeno MD    ASSISTANT:  Dr. Naomi Andre:  None. ESTIMATED BLOOD LOSS:  50 mL. FLUIDS:  Crystalloid. SPECIMENS:  1. Colon. 2.  Terminal ileum, ileocecal valve and cecum. DESCRIPTION OF PROCEDURE:  This is a 71-year-old female, who presented with  recurrent GI hemorrhage with identifying colonoscopy showing a bleed on the  right side of the colon. She had previously had diverticular bleeds on the  left side of her colon that were documented on previous endoscopies. She  had a CTA, which showed localization of the right colon. She had an  endoscopy with failed control of the hemorrhage and she had recurrent  requirements for blood transfusion. She had received over 8 units of  transfusion during her hospital admission and remained with labile blood  pressure and heart rate. In the ICU after being explained risks, benefits,  alternatives of the procedure, she agreed to proceed. She was taken to the operating room, placed supine, administered general  anesthesia and intubated. Once the airway was secured and she was  adequately sedated, she was prepped and draped in a normal sterile fashion. Time-out was performed to confirm the surgery site and patient's name.   She  received preoperative antibiotics in the form of Zosyn. We initially made  a 5-mm incision superior to the umbilicus, inserted a Veress needle,  confirmed needle placement, insufflated to 15 mmHg, removed the Veress  needle, inserted a camera to follow, and inspected the abdomen. There was  no evidence of any abnormalities. No evidence of any cancer or adhesions. We initially identified some adhesions down into the pelvis. The sigmoid  colon was very tented up to the left ovary in the posterior wall of the  uterus. This was taken down with a LigaSure device. We inserted a 12-mm  trocar in the right lower quadrant and a 5-mm trocar in the right lateral  abdomen, and then we performed medial and lateral dissection identifying  the sigmoid colon. Identifying the sigmoid-rectal junction, we created a  window inferior to it and fired a 60 [place] green load across it, and then  used a LigaSure device to take the mesentery proximally. I dissected  proximally up identifying the left ureter keeping it deep and safe from our  dissection. We continued our dissection up to white line of Toldt,  completely taking it down and then taking the mesentery being careful to  remain close to the colon itself. We took it all the way up to splenic  flexure. The splenic flexure was mobilized completely away from the  spleen. We then turned our attention to the right side of the colon, placed two  more 5-mm trocars, one in the left lower quadrant and one just inferior to  the umbilicus. We then performed medial and lateral dissection,  identifying the ileocolic pedicle, dissecting deep to it, identifying the  duodenum and keeping it safe and deep away from our dissection. I  dissected all the way to the left lateral side wall as well as up to the  transverse colon and entered into the lesser sac behind the stomach.   We  then performed a lateral dissection starting at the transverse colon,   it from the greater omentum and then dissecting all the way  around the hepatic flexure and taking it around the white line of Toldt on  the right side, taking it all the way out to the terminal ileum, 15 cm  proximally, took our mesentery all the way up to the junction of the  mesenteric-terminal ileal junction. We then identified the ileocolic  pedicle and again made sure that the duodenum was deep to our dissection  and dissected it away carefully,  and then took the ileocolic pedicle using  the LigaSure device. We then took down the transverse colon by staying  very close to the colon itself and  it from the greater omentum. As we completely mobilized it away, we freed up the colon in its entirety  and sacrificed the blood supply all the away around and then created an  ileostomy site in the right lower quadrant through the rectus muscle by   the fascial layers in a cruciate fashion and then dissecting and   the rectus muscle. We then inserted a wound protector device in  the form of the Aston device and then eviscerated our specimen. We were  able to completely remove it. Cecum would not gently come through the  opening, therefore, we fired an August stapler blue load across the cecum  and then reinspected the abdomen to confirm that we had completely  mobilized and adequately mobilized the terminal ileum, which we had. We  were then able to completely eviscerate the remainder of the specimen. The  August blue load was then used to fire across the terminal ileum. The  specimen was passed off the table and labeled colon. We then reinsufflated the abdomen, inspected for hemostasis, irrigated  until the fluid returned clear, and then matured our ileostomy in the right  lower quadrant at the site of the wound protector device in a Bettie  fashion using 3-0 interrupted Vicryl stitches.   The 12-mm trocar site was  closed with an 0 Vicryl interrupted stitch and then 4-0 Monocryl was used  to close each one of the skin incisions and surgical glue was placed over  the top. The patient was awoken, extubated, and transferred to the  postoperative care unit in stable condition. All instrument counts, lap  counts, and needle counts were correct at the completion of the procedure.         James Cole MD    D: 07/02/2018 18:13:27       T: 07/03/2018 1:19:31     [email or web address]  Job#: [id number]     Doc#: [id number]    CC:

## 2018-07-03 NOTE — ANESTHESIA POSTPROCEDURE EVALUATION
Department of Anesthesiology  Postprocedure Note    Patient: Karoline Donahue  MRN: [id number]  YOB: 1945  Date of evaluation: 7/2/2018  Time:  9:32 PM     Procedure Summary     Date:  07/02/18 Room / Location:  [facility] OR 05 / Novella Lesches    Anesthesia Start:  1547 Anesthesia Stop:  1822    Procedure:  LAPAROSCOPIC POSS OPEN SUBTOTAL COLECTOMY POSS ILEOSTOMY (N/A Abdomen) Diagnosis:  (N/A)    Surgeon:  Nader Hancock MD Responsible Provider:  Davie Bond MD    Anesthesia Type:  general ASA Status:  3          Anesthesia Type: No value filed. Tyrese Phase I: Tyrese Score: 6    Tyrese Phase II: Tyrese Score: 8    Last vitals: Reviewed and per EMR flowsheets.        Anesthesia Post Evaluation    Patient location during evaluation: ICU  Patient participation: complete - patient participated  Level of consciousness: awake and sleepy but conscious  Airway patency: patent  Nausea & Vomiting: no nausea and no vomiting  Complications: no  Cardiovascular status: hemodynamically stable  Respiratory status: acceptable  Hydration status: euvolemic

## 2018-07-03 NOTE — PROGRESS NOTES
GENERAL SURGERY  DAILY PROGRESS NOTE  7/3/2018    Subjective:   taken to OR yesterday for lap subtotal colectomy and end ileostomy. Having some pain this morning. Urine output minimal since OR. Not drinking much    Objective:  BP (!) 186/90   Pulse 58   Temp 98 °F (36.7 °C) (Oral)   Resp 23   Ht 5' 4\" (1.626 m)   Wt 245 lb (111.1 kg)   SpO2 92%   BMI 42.05 kg/m²     GENERAL:  Laying in bed, awake, alert, cooperative, no apparent distress  HEAD: Normocephalic, atraumatic  EYES: No sclera icterus, pupils equal  LUNGS:  No increased work of breathing  CARDIOVASCULAR:  tachycardia  ABDOMEN:  Soft, appropriately TTP, ND, Ileostomy in RLQ stoma pink and patent with bowel sweat. EXTREMITIES: No edema or swelling  SKIN: Warm and dry    Assessment/Plan:  68 y.o. female with lower GI hemorrhage from hepatic flexure diverticulum clipped and injected with GI via colonoscopy 6/27.  Recurrent lower GI bleeding s/p Laparoscopic subtotal colectomy with end ileostomy POD 1.   - Critical care mgmt per ICU team  - [id number] for diet, IVF until tolerating more PO  - Pain control: norco, added toradol which has worked for her in past.   - Ambulate TID    Electronically signed by Enma Arteaga MD on 7/3/2018 at 6:59 AM     As above  Doing well  Hgb stable  Staley out  [id number] out of ICU  IVF bolus for oliguria  Ok for anticoagulation once hgb stable for 24hrs  Monitor ostomy output- call if greater than 2L, will need fiber once ostomy working  Await return of bowel function    Bethany Gottlieb MD, MS  Minimally Invasive and Bariatric Surgery  [phone] (p)  7/4/2018  8:30 AM

## 2018-07-03 NOTE — PROGRESS NOTES
BUN 18 07/03/2018    CREATININE 1.0 07/03/2018    GFRAA >60 07/03/2018    LABGLOM 54 07/03/2018    GLUCOSE 170 07/03/2018    GLUCOSE 87 03/22/2012    PROT 5.9 07/03/2018    LABALBU 3.8 07/03/2018    LABALBU 4.2 03/22/2012    CALCIUM 8.9 07/03/2018    BILITOT 0.6 07/03/2018    ALKPHOS 49 07/03/2018    AST 34 07/03/2018    ALT 25 07/03/2018     PT/INR:    Lab Results   Component Value Date    PROTIME 12.7 06/28/2018    INR 1.1 06/28/2018         RADIOLOGY REPORT   Final Result      IR ULTRASOUND GUIDANCE VASCULAR ACCESS   Final Result   Ultrasound guidance was provided during placement of a   PICC line. IR FLUORO GUIDED CVA DEVICE PLACEMENT   Final Result   Successful placement of a 5 [place]ones[place] double-lumen Power   PICC line using ultrasound guidance via the right basilic  vein. CT ABDOMEN PELVIS W IV CONTRAST Additional Contrast? None   Final Result   1. New surgical clips within the right colon with a few tiny adjacent   foci of air, possibly related to microperforation. 2. Scattered colonic diverticula without evidence of acute   diverticulitis. 3. Diffuse fatty infiltration of the liver. Findings were discussed with Dr. Nicolas Velazquez on 6/27/2018 at 5:45   PM.      NM GI BLOOD LOSS   Final Result   Addendum 1 of 1   Addendum: Dr. Tho Navarro notified of this finding via telephone   conversation at 9:45 AM. Active GI bleeding appears to be in the   region of the proximal colon near the hepatic flexure. Final   Active gastrointestinal hemorrhage proximal colon. THIS IS AN ABNORMAL REPORT. PLEASE TAKE APPROPRIATE MEASURES. .         CT ABDOMEN PELVIS W IV CONTRAST Additional Contrast? None   Final Result   1. No evidence of urinary or bowel obstruction. 2. Abnormal appearance the distal colon with diverticula, mild mural   thickening and mild pericolonic stranding.  Small nodules are noted   within the mesentery which could be focally enlarged lymph nodes range   up to about 12 mm in diameter. Differential includes low-grade   diverticulitis or malignancy. Final report differs from that generated by Bala Haile. These findings were   communicated to radiology department personnel by critical results   form for further communication to the requesting physician at the   conclusion of this examination.              Other Data:      Intake/Output Summary (Last 24 hours) at 07/03/18 0810  Last data filed at 07/03/18 0700   Gross per 24 hour   Intake             2300 ml   Output              410 ml   Net             1890 ml         Scheduled Medications:   losartan  50 mg Oral Daily    And    hydrochlorothiazide  12.5 mg Oral Daily    pantoprazole  40 mg Oral QAM AC    sotalol  80 mg Oral BID    piperacillin-tazobactam  3.375 g Intravenous Q8H    fluconazole  200 mg Intravenous Q24H    vitamin D3  5,000 Units Oral Daily    levothyroxine  50 mcg Oral Daily    sodium chloride (PF)  10 mL Intravenous Daily    sodium chloride (PF)  10 mL Intravenous 2 times per day         Infusion Medications:   dextrose Stopped (07/01/18 0024)       Assessment:   Patient Active Problem List    Diagnosis Date Noted    Paroxysmal atrial fibrillation with rapid ventricular response ([facility] [address] 06/29/2018    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 06/27/2018    Lower GI bleed 06/26/2018    Diverticulitis of intestine with bleeding 06/26/2018    Anemia due to blood loss, acute 06/26/2018    Essential hypertension 06/26/2018    Acquired hypothyroidism 06/26/2018    Osteoarthritis of multiple joints 06/26/2018    Essential hypertension 05/11/2017    Acquired hypothyroidism 05/11/2017    Class 2 obesity in adult 05/11/2017    Osteoarthritis of multiple joints 05/11/2017         Plan: Patient denies any nausea vomiting, patient had started him back on diet, patient is post right hemicolectomy, was switched to oral antibiotic cannot use Levaquin as patient on sotalol now, transfer patient to

## 2018-07-03 NOTE — PROGRESS NOTES
PROGRESS NOTE    By Annemarie Cho D.O GI Fellow    [facility]  Dr. Devonte Zuñiga MD, Dr. Francine Ortiz MD, Dr Belkys Nunez, Dr. Sunny Moses MD      Ruben Maciel  68 y.o.  female    SUBJECTIVE: No overnight events noted. Patient tolerated surgery well. OBJECTIVE:    BP (!) 172/91   Pulse 58   Temp 98 °F (36.7 °C) (Oral)   Resp 23   Ht 5' 4\" (1.626 m)   Wt 245 lb (111.1 kg)   SpO2 92%   BMI 42.05 kg/m²     Gen: NAD, AAO x 3  HEENT:PEERL, no icterus  Heart: RRR, no M/R/G  Lungs: CTAB  Abd.: soft, NT, ND, BS +, no G/R, no HSM  Extr.: no C/C/E, no bruising         Lab Results   Component Value Date    WBC 15.1 07/03/2018    WBC 10.1 07/02/2018    WBC 11.3 06/29/2018    HGB 9.9 07/03/2018    HGB 9.4 07/02/2018    HGB 8.7 07/02/2018    HCT 30.1 07/03/2018    MCV 90.1 07/03/2018    RDW 15.1 07/03/2018     07/03/2018     07/02/2018     06/29/2018     Lab Results   Component Value Date     07/03/2018    K 4.4 07/03/2018     07/03/2018    CO2 25 07/03/2018    BUN 18 07/03/2018    CREATININE 1.0 07/03/2018    CALCIUM 8.9 07/03/2018    PROT 5.9 07/03/2018    LABALBU 3.8 07/03/2018    LABALBU 4.2 03/22/2012    BILITOT 0.6 07/03/2018    BILITOT 0.3 07/02/2018    BILITOT <0.2 06/29/2018    ALKPHOS 49 07/03/2018    ALKPHOS 52 07/02/2018    ALKPHOS 59 06/29/2018    AST 34 07/03/2018    AST 15 07/02/2018    AST 14 06/29/2018    ALT 25 07/03/2018    ALT 11 07/02/2018    ALT 13 06/29/2018     No results found for: LIPASE  No results found for: AMYLASE      ASSESSMENT/PLAN:   Acute blood loss anemia -  Hematochezia secondary to diverticular bleeding - status post colonoscopy hemostasis obtained with injection of epinephrine and clips on 6/27/18 - for details, please, refer to pertinent procedure note by Dr. Urbano Reid. - recurrent hematochezia with decrease in Hgb. Patient underwent subtotal colectomy with ileostomy POD 1.      Pt was seen, d/w, and examined with Dr. Zeus Wilson

## 2018-07-03 NOTE — PROGRESS NOTES
notified  Rehab Potential: good     Patients Goal: home    ASSESSMENT  Patient exhibits decreased strength, balance, coordination impairing functional mobility. GOALS to be met in 4 days. Bed mobility-  Independent        Transfers-Sit to stand-Independent     Gait:  Patient to ambulate 200 feet Independent     Steps: Patient to go up and down 4  step(s) using 1 rail(s) with  Supervision      Increase strength in affected mm groups by 1/3 grade  Increase balance to allow for improvement towards functional goals. Increase endurance to allow for improvement towards functional goals.         Marianne Vaughan, PT

## 2018-07-03 NOTE — PROGRESS NOTES
solving skills   good  Memory    good  Sequencing  Communication: intact   Visual perceptual skills: intact     Glasses: yes   Edema: no     Sensation: intact   Hand Dominance:  Left     X  Right     Left Right Comment   Passive range of motion [facility]     Active range of motion [facility]     Muscle Grade 4/5 4/5    /pinch Strength Intact  Intact     [] Malnutrition indicators have been identified and nursing has been notified to ensure a dietitian consult is ordered. Function Assessment    Status  Goal Comment   Feeding:  Independent   Independent      Grooming:  Minimal Assist  Independent      UE dressing:  Minimal Assist  Independent     LE dressing:  Dependent  Independent  To don pajama pants while seated EOB. Bathing: Moderate Assist  Independent     Bed Mobility:     Minimal Assist  for supine to sit,   Minimal Assist  for scooting,  Sit to supine : n/t as pt was up in chair Independent     Functional Transfers:    Minimal Assist  for sit to stand transfer from EOB to wheeled walker Independent  Safety: good    Functional Mobility: 75 feet x 2 with wheeled walker and  Minimal Assist  Independent       Pt/family participated in establishment of goals. Balance:   Sitting: good   Standing: fair with wheeled walker    Endurance: fair       Assessment of Current Deficits:   [x]Functional mobility  []ROM  [x]Strength   []Cognition   []Safety Awareness    [x]ADLs [x]IADLs   [x]Endurance  [x]Balance    []Vision  []Sensation     []Gross Motor Coordination       []Fine Motor Coordination     · Low Complexity  · History: Brief history including review of medical records relating to the problem  · Exam: 1-3 performance Deficits  · Assistance/Modification: No assistance or modifications required to perform tasks. No comorbities affecting occupational performance.     Patients Goal: return home   Treatment: OT eval, bed mobility, sitting balance at EOB for 10 minutes with supervision, transfer training with

## 2018-07-04 LAB
ALBUMIN SERPL-MCNC: 3.4 G/DL (ref 3.5–5.2)
ALP BLD-CCNC: 48 U/L (ref 35–104)
ALT SERPL-CCNC: 23 U/L (ref 0–32)
ANION GAP SERPL CALCULATED.3IONS-SCNC: 11 MMOL/L (ref 7–16)
AST SERPL-CCNC: 26 U/L (ref 0–31)
BASOPHILS ABSOLUTE: 0.02 E9/L (ref 0–0.2)
BASOPHILS RELATIVE PERCENT: 0.1 % (ref 0–2)
BILIRUB SERPL-MCNC: 0.3 MG/DL (ref 0–1.2)
BUN BLDV-MCNC: 18 MG/DL (ref 8–23)
CALCIUM SERPL-MCNC: 8.8 MG/DL (ref 8.6–10.2)
CHLORIDE BLD-SCNC: 103 MMOL/L (ref 98–107)
CO2: 26 MMOL/L (ref 22–29)
CREAT SERPL-MCNC: 1.1 MG/DL (ref 0.5–1)
EOSINOPHILS ABSOLUTE: 0.03 E9/L (ref 0.05–0.5)
EOSINOPHILS RELATIVE PERCENT: 0.2 % (ref 0–6)
GFR AFRICAN AMERICAN: 59
GFR NON-AFRICAN AMERICAN: 49 ML/MIN/1.73
GLUCOSE BLD-MCNC: 120 MG/DL (ref 74–109)
HCT VFR BLD CALC: 28.2 % (ref 34–48)
HEMOGLOBIN: 9 G/DL (ref 11.5–15.5)
IMMATURE GRANULOCYTES #: 0.09 E9/L
IMMATURE GRANULOCYTES %: 0.6 % (ref 0–5)
LYMPHOCYTES ABSOLUTE: 1.54 E9/L (ref 1.5–4)
LYMPHOCYTES RELATIVE PERCENT: 11 % (ref 20–42)
MCH RBC QN AUTO: 29.3 PG (ref 26–35)
MCHC RBC AUTO-ENTMCNC: 31.9 % (ref 32–34.5)
MCV RBC AUTO: 91.9 FL (ref 80–99.9)
MONOCYTES ABSOLUTE: 1.32 E9/L (ref 0.1–0.95)
MONOCYTES RELATIVE PERCENT: 9.5 % (ref 2–12)
NEUTROPHILS ABSOLUTE: 10.96 E9/L (ref 1.8–7.3)
NEUTROPHILS RELATIVE PERCENT: 78.6 % (ref 43–80)
PDW BLD-RTO: 15.5 FL (ref 11.5–15)
PLATELET # BLD: 198 E9/L (ref 130–450)
PMV BLD AUTO: 9.7 FL (ref 7–12)
POTASSIUM SERPL-SCNC: 4 MMOL/L (ref 3.5–5)
RBC # BLD: 3.07 E12/L (ref 3.5–5.5)
SODIUM BLD-SCNC: 140 MMOL/L (ref 132–146)
TOTAL PROTEIN: 5.6 G/DL (ref 6.4–8.3)
WBC # BLD: 14 E9/L (ref 4.5–11.5)

## 2018-07-04 PROCEDURE — 6370000000 HC RX 637 (ALT 250 FOR IP): Performed by: SURGERY

## 2018-07-04 PROCEDURE — 6370000000 HC RX 637 (ALT 250 FOR IP): Performed by: SPECIALIST

## 2018-07-04 PROCEDURE — 6370000000 HC RX 637 (ALT 250 FOR IP): Performed by: INTERNAL MEDICINE

## 2018-07-04 PROCEDURE — 80053 COMPREHEN METABOLIC PANEL: CPT

## 2018-07-04 PROCEDURE — 2580000003 HC RX 258: Performed by: INTERNAL MEDICINE

## 2018-07-04 PROCEDURE — 36415 COLL VENOUS BLD VENIPUNCTURE: CPT

## 2018-07-04 PROCEDURE — 85025 COMPLETE CBC W/AUTO DIFF WBC: CPT

## 2018-07-04 PROCEDURE — 6360000002 HC RX W HCPCS: Performed by: INTERNAL MEDICINE

## 2018-07-04 PROCEDURE — 1200000000 HC SEMI PRIVATE

## 2018-07-04 RX ORDER — CIPROFLOXACIN 500 MG/1
500 TABLET, FILM COATED ORAL EVERY 12 HOURS SCHEDULED
Status: DISCONTINUED | OUTPATIENT
Start: 2018-07-04 | End: 2018-07-05 | Stop reason: HOSPADM

## 2018-07-04 RX ORDER — CIPROFLOXACIN 500 MG/1
500 TABLET, FILM COATED ORAL EVERY 12 HOURS SCHEDULED
Qty: 20 TABLET | Refills: 0 | Status: SHIPPED | OUTPATIENT
Start: 2018-07-04 | End: 2018-07-14

## 2018-07-04 RX ORDER — FLUCONAZOLE 200 MG/1
200 TABLET ORAL DAILY
Qty: 7 TABLET | Refills: 0 | Status: SHIPPED | OUTPATIENT
Start: 2018-07-04 | End: 2018-07-11

## 2018-07-04 RX ORDER — SOTALOL HYDROCHLORIDE 80 MG/1
80 TABLET ORAL 2 TIMES DAILY
Qty: 60 TABLET | Refills: 3 | Status: SHIPPED | OUTPATIENT
Start: 2018-07-04

## 2018-07-04 RX ORDER — FLUCONAZOLE 100 MG/1
200 TABLET ORAL DAILY
Status: DISCONTINUED | OUTPATIENT
Start: 2018-07-04 | End: 2018-07-05 | Stop reason: HOSPADM

## 2018-07-04 RX ORDER — METRONIDAZOLE 500 MG/1
500 TABLET ORAL EVERY 8 HOURS SCHEDULED
Status: DISCONTINUED | OUTPATIENT
Start: 2018-07-04 | End: 2018-07-05 | Stop reason: HOSPADM

## 2018-07-04 RX ORDER — METRONIDAZOLE 500 MG/1
500 TABLET ORAL EVERY 8 HOURS SCHEDULED
Qty: 30 TABLET | Refills: 0 | Status: SHIPPED | OUTPATIENT
Start: 2018-07-04 | End: 2018-07-14

## 2018-07-04 RX ADMIN — PIPERACILLIN SODIUM AND TAZOBACTAM SODIUM 3.38 G: 3; .375 INJECTION, POWDER, LYOPHILIZED, FOR SOLUTION INTRAVENOUS at 04:02

## 2018-07-04 RX ADMIN — SOTALOL HYDROCHLORIDE 80 MG: 80 TABLET ORAL at 20:29

## 2018-07-04 RX ADMIN — LOSARTAN POTASSIUM 50 MG: 50 TABLET ORAL at 09:07

## 2018-07-04 RX ADMIN — Medication 10 ML: at 20:30

## 2018-07-04 RX ADMIN — METRONIDAZOLE 500 MG: 500 TABLET ORAL at 13:58

## 2018-07-04 RX ADMIN — CIPROFLOXACIN 500 MG: 500 TABLET, FILM COATED ORAL at 12:33

## 2018-07-04 RX ADMIN — FLUCONAZOLE 200 MG: 100 TABLET ORAL at 11:21

## 2018-07-04 RX ADMIN — HYDROCHLOROTHIAZIDE 12.5 MG: 12.5 TABLET ORAL at 09:07

## 2018-07-04 RX ADMIN — PANTOPRAZOLE SODIUM 40 MG: 40 TABLET, DELAYED RELEASE ORAL at 05:47

## 2018-07-04 RX ADMIN — Medication 10 ML: at 09:07

## 2018-07-04 RX ADMIN — METRONIDAZOLE 500 MG: 500 TABLET ORAL at 21:45

## 2018-07-04 RX ADMIN — HYDROCODONE BITARTRATE AND ACETAMINOPHEN 2 TABLET: 5; 325 TABLET ORAL at 12:34

## 2018-07-04 RX ADMIN — SOTALOL HYDROCHLORIDE 80 MG: 80 TABLET ORAL at 09:07

## 2018-07-04 RX ADMIN — CIPROFLOXACIN 500 MG: 500 TABLET, FILM COATED ORAL at 20:29

## 2018-07-04 RX ADMIN — CHOLECALCIFEROL TAB 125 MCG (5000 UNIT) 5000 UNITS: 125 TAB at 09:07

## 2018-07-04 RX ADMIN — LEVOTHYROXINE SODIUM 50 MCG: 25 TABLET ORAL at 05:47

## 2018-07-04 RX ADMIN — HYDROCODONE BITARTRATE AND ACETAMINOPHEN 2 TABLET: 5; 325 TABLET ORAL at 19:05

## 2018-07-04 RX ADMIN — HYDROCODONE BITARTRATE AND ACETAMINOPHEN 2 TABLET: 5; 325 TABLET ORAL at 01:58

## 2018-07-04 ASSESSMENT — PAIN SCALES - GENERAL
PAINLEVEL_OUTOF10: 6
PAINLEVEL_OUTOF10: 4
PAINLEVEL_OUTOF10: 0
PAINLEVEL_OUTOF10: 4

## 2018-07-04 ASSESSMENT — PAIN DESCRIPTION - ORIENTATION: ORIENTATION: LOWER;MID

## 2018-07-04 ASSESSMENT — PAIN DESCRIPTION - DESCRIPTORS
DESCRIPTORS: PRESSURE;SORE
DESCRIPTORS: ACHING;DISCOMFORT;DULL
DESCRIPTORS: ACHING;CONSTANT;DISCOMFORT

## 2018-07-04 ASSESSMENT — PAIN DESCRIPTION - LOCATION
LOCATION: ABDOMEN

## 2018-07-04 ASSESSMENT — PAIN DESCRIPTION - FREQUENCY: FREQUENCY: INTERMITTENT

## 2018-07-04 ASSESSMENT — PAIN DESCRIPTION - ONSET: ONSET: ON-GOING

## 2018-07-04 ASSESSMENT — PAIN DESCRIPTION - PAIN TYPE
TYPE: SURGICAL PAIN
TYPE: ACUTE PAIN

## 2018-07-04 ASSESSMENT — PAIN DESCRIPTION - PROGRESSION: CLINICAL_PROGRESSION: GRADUALLY IMPROVING

## 2018-07-04 NOTE — PROGRESS NOTES
GENERAL SURGERY  DAILY PROGRESS NOTE  7/4/2018    Subjective:  Doing well    Objective:  BP (!) 158/90   Pulse 109   Temp 98.1 °F (36.7 °C) (Oral)   Resp 18   Ht 5' 4\" (1.626 m)   Wt 245 lb (111.1 kg)   SpO2 98%   BMI 42.05 kg/m²     GENERAL:  Laying in bed, awake, alert, cooperative, no apparent distress  HEAD: Normocephalic, atraumatic  EYES: No sclera icterus, pupils equal  LUNGS:  No increased work of breathing  CARDIOVASCULAR:  tachycardia  ABDOMEN:  Soft, appropriately TTP, ND, Ileostomy in RLQ stoma pink and patent with bowel sweat. EXTREMITIES: No edema or swelling  SKIN: Warm and dry    Assessment/Plan:  68 y.o. female with lower GI hemorrhage from hepatic flexure diverticulum clipped and injected with GI via colonoscopy 6/27. Recurrent lower GI bleeding s/p Laparoscopic subtotal colectomy with end ileostomy POD 2.      Christoph Benavides for home

## 2018-07-04 NOTE — PROGRESS NOTES
Date:  7/3/2018  Patient: Rocky Hoffmann  Admission:  6/25/2018 11:53 PM  Admit DX: GI bleed [K92.2]  Age:  68 y.o., 1945     LOS: 7 days       SUBJECTIVE:    Patient was seen and examined. She is not reporting any cardiac symptoms. She had subtotal colectomy and ileostomy secondary to her pandiverticulosis and GI bleed. She is currently in sinus rhythm. Her blood pressure is stable. No fever. OBJECTIVE:    /61   Pulse 65   Temp 98.2 °F (36.8 °C) (Oral)   Resp 18   Ht 5' 4\" (1.626 m)   Wt 245 lb (111.1 kg)   SpO2 93%   BMI 42.05 kg/m²     Intake/Output Summary (Last 24 hours) at 07/03/18 2252  Last data filed at 07/03/18 2049   Gross per 24 hour   Intake              270 ml   Output              490 ml   Net             -220 ml       EXAM:   General: Alert and oriented, No acute distress. Neck: Supple. No jugular venous distention. Respiratory: Lungs are clear to auscultation, Respirations are non-labored, Breath sounds are equal, Symmetrical chest wall expansion. Cardiovascular: Normal rate, No murmur, No gallop, Good pulses equal in all extremities, No edema. Gastrointestinal: Slightly diminished bowel sounds. Musculoskeletal: Normal strength, No tenderness, No deformity. Neurologic: Alert, Oriented, No focal deficits. Cognition and Speech: Oriented, Speech clear and coherent.       Current Inpatient Medications:   losartan  50 mg Oral Daily    And    hydrochlorothiazide  12.5 mg Oral Daily    pantoprazole  40 mg Oral QAM AC    sotalol  80 mg Oral BID    piperacillin-tazobactam  3.375 g Intravenous Q8H    fluconazole  200 mg Intravenous Q24H    vitamin D3  5,000 Units Oral Daily    levothyroxine  50 mcg Oral Daily    sodium chloride (PF)  10 mL Intravenous Daily    sodium chloride (PF)  10 mL Intravenous 2 times per day       IV Infusions (if any):   dextrose Stopped (07/01/18 0024)         Labs:   CBC:   Recent Labs      07/02/18   0420  07/02/18   1527  07/03/18

## 2018-07-04 NOTE — PROGRESS NOTES
Patient to be discharged tomorrow 7-5-18. To be seen by Vin Frederick before she leaves for supplies and teaching.

## 2018-07-04 NOTE — DISCHARGE SUMMARY
Physician Discharge Summary     Patient ID:  Shruti Harris  [id number]  75 y.o.  1945    Admit date: 6/25/2018    Discharge date and time:7/4/2018  Admitting Physician: Mane Vasquez MD     Discharge Physician; Mane Vasquez    Admission Diagnoses: GI bleed [K92.2]    Discharge Diagnoses: Recurrent acute diverticular bleed requiring 8 units packed red blood cell transfusion, patient had right hemicolectomy with ileostomy, paroxysmal atrial fibrillation, hypertension, osteoarthritis, acute diverticulitis with microperforation. Admission Condition: Severe lower GI bleed with passing clots, hypertension, requiring blood transfusion, admitted to intensive care unit. Discharged Condition: Stable now has ileostomy. Indication for Admission: Recurrent lower GI bleed. Hospital Course: Patient admitted from emergency room with lower GI bleed, patient had the GI consult had the colonoscopy had 2 surgical clips placed through colonoscopy, had general surgery consult as standby, infectious disease consult with microperforation of colon, was placed on IV antibiotic Zosyn and Diflucan, patient continued to have paroxysmal atrial fibrillation in spite of increasing metoprolol 200 mg twice a day, had cardiology consult, patient was placed on sotalol, EKG was done daily ×3 to make sure patient QTc interval was within accepted range to continue sotalol, patient stayed in sinus rhythm, patient had recurrent lower GI bleed requiring 8 units packed red blood cell transfusion. Patient was taken for surgery had the right hemicolectomy, had ileostomy. Consults: GI, infectious disease, general surgery, cardiology consult. Significant Diagnostic Studies: Nm Gi Blood Loss    Addendum Date: 6/27/2018    Addendum: Dr. David Harris notified of this finding via telephone conversation at 9:45 AM. Active GI bleeding appears to be in the region of the proximal colon near the hepatic flexure.     Result Date: 6/27/2018  Reading location: 200 INDICATION: GI bleeding FINDINGS: Nuclear GI bleed study after intravenous injection 20.8 mCi of technetium 99m pertechnetate. Sequential images obtained to 60 minutes post injection. Active gastrointestinal hemorrhage is noted in the region of the cecum/ascending colon with activity progressing into the transverse and descending segments of colon. Active gastrointestinal hemorrhage proximal colon. THIS IS AN ABNORMAL REPORT. PLEASE TAKE APPROPRIATE MEASURES. .     Ct Abdomen Pelvis W Iv Contrast Additional Contrast? None    Result Date: 6/27/2018  Location: 200 Indication: Abdominal pain post colonoscopy. Comparison: CT abdomen/pelvis from 6/26/2018. Technique: Multidetector CT imaging of the abdomen and pelvis was performed after the administration of intravenous contrast (80 cc of Isovue-370). Coronal and sagittal reformatted images were obtained. Automated dose exposure control was used for this exam. FINDINGS: Visualized portion bilateral lung bases demonstrate minimal pleural parenchymal bands within bilateral lower lobes. There is no pleural effusion. There is no free fluid within the abdomen or pelvis. The liver is normal in morphology. There is diffuse hypoattenuation. The gallbladder, pancreas, and spleen are unremarkable. The distal esophagus, stomach, duodenum are normal in appearance. The small bowel loops are normal in caliber. The appendix is not identified. There are new surgical clips within the right colon (series 501, image 50) with a few tiny adjacent foci of air, possibly extraluminal. There are scattered colonic diverticula without evidence of acute diverticulitis. Redemonstrated are a few regional lymph nodes along the sigmoid colon, which are not suspicious by CT size criteria. Bilateral adrenal glands are normal in appearance. Bilateral kidneys are normal in morphology and demonstrate symmetric enhancement. There is a fluid attenuation cyst at the superior pole of the left kidney.  There the right basilic   vein. A permanent recording was created for the patient's record. Ultrasound guidance was provided during placement of a PICC line. Discharge Exam:  Patient awake and alert cooperative, has been ambulating without any difficulty, vital signs stable, neck no JVD no lymphadenopathy, lungs clear to auscultation, normal heart sounds, abdomen soft nontender has ileostomy, extremity no edema, neuro no focal deficit. Telemetry EKG QTc interval within normal range. Patient staying in sinus rhythm. Disposition: Home. Patient Instructions:      Medication List      START taking these medications    sotalol 80 MG tablet  Commonly known as:  BETAPACE  Take 1 tablet by mouth 2 times daily        CONTINUE taking these medications    celecoxib 200 MG capsule  Commonly known as:  CELEBREX     GLUCOSAMINE CHONDROITIN ADV PO     levothyroxine 50 MCG tablet  Commonly known as:  SYNTHROID     losartan-hydrochlorothiazide 50-12.5 MG per tablet  Commonly known as:  HYZAAR     traMADol 50 MG tablet  Commonly known as:  ULTRAM     vitamin D-3 5000 units Tabs  Commonly known as:  cholecalciferol        STOP taking these medications    aspirin 325 MG EC tablet     aspirin 81 MG chewable tablet     metoprolol tartrate 25 MG tablet  Commonly known as:  LOPRESSOR           Where to Get Your Medications      These medications were sent to [address]    Phone:  [phone]   · sotalol 80 MG tablet         Activity:As tolerated, patient to follow with general surgery, with cardiology. Follow-up with Dr. Mary Ortega within couple days.     Completed By:  Dr. Mike Monroe MD, Tasha Level.  9:54 AM  7/4/2018      Electronically signed by Bonifacio Red MD on 7/4/2018 at 9:54 AM

## 2018-07-04 NOTE — PROGRESS NOTES
[facility]  PROGRESS NOTE  Admit Date:  6/25/2018   Age:   68 y.o. PCP:   Dick Henry MD, Dick Henry MD    CC:   Chief Complaint   Patient presents with    Rectal Bleeding       Denies n/v, fever, chills.  Eating full      ROS:  Constitutional:  denies fever , chills   Cardiovascular: denies chest pain or palpitation   Gastrointestinal: . Denies abdominal pain no nausea or vomiting  Genitourinary:      Denies  dysuria or burning micturition  Musculoskeletal: no aches or pain   Allergic/Immunologic:  No rash or itching     Vitals:    07/03/18 1632 07/03/18 2034 07/04/18 0005 07/04/18 0838   BP:  138/61  (!) 158/90   Pulse:  65  109   Resp:  18  18   Temp:  98.2 °F (36.8 °C)  98.1 °F (36.7 °C)   TempSrc:  Oral  Oral   SpO2: 93%  94% 98%   Weight:       Height:         General:  alert, cooperative and no distress  Head:   Normocephalic,  atraumatic  Lungs:  Non labored, equal chest rise  Heart[de-identified]  RRR    Abdomen:  Incisions c/d/i, stoma pink, ostomy with brown liquid output, ND, appropriately tender  : quezada pink-yellow, sediment  Extremities: no cyanosis or edema, PICC RUE  Skin:   Skin color, texture, turgor normal. No rashes or lesions  Neurologic:  Alert and oriented , no focal deficits    I & O - 24hr:    Intake/Output Summary (Last 24 hours) at 07/04/18 1048  Last data filed at 07/04/18 0550   Gross per 24 hour   Intake              290 ml   Output              250 ml   Net               40 ml     Labs:  Recent Labs      07/02/18   0420  07/02/18   1527  07/03/18   0455  07/04/18   0605   WBC  10.1   --   15.1*  14.0*   HGB  8.7*  9.4*  9.9*  9.0*   HCT  26.5*  28.6*  30.1*  28.2*   MCV  90.4   --   90.1  91.9   PLT  214   --   198  198     Recent Labs      07/02/18   0420  07/03/18   0455  07/04/18   0605   NA  143  143  140   K  3.8  4.4  4.0   CL  109*  105  103   CO2  27  25  26   BUN  13  18  18   CREATININE  0.9  1.0  1.1*   GFRAA  >60  >60  59   LABGLOM  >60

## 2018-07-05 VITALS
DIASTOLIC BLOOD PRESSURE: 85 MMHG | RESPIRATION RATE: 18 BRPM | HEART RATE: 67 BPM | HEIGHT: 64 IN | WEIGHT: 245 LBS | SYSTOLIC BLOOD PRESSURE: 162 MMHG | TEMPERATURE: 98.3 F | OXYGEN SATURATION: 94 % | BODY MASS INDEX: 41.83 KG/M2

## 2018-07-05 LAB
ALBUMIN SERPL-MCNC: 3.4 G/DL (ref 3.5–5.2)
ALP BLD-CCNC: 50 U/L (ref 35–104)
ALT SERPL-CCNC: 22 U/L (ref 0–32)
ANION GAP SERPL CALCULATED.3IONS-SCNC: 8 MMOL/L (ref 7–16)
AST SERPL-CCNC: 21 U/L (ref 0–31)
BASOPHILS ABSOLUTE: 0.04 E9/L (ref 0–0.2)
BASOPHILS RELATIVE PERCENT: 0.4 % (ref 0–2)
BILIRUB SERPL-MCNC: 0.3 MG/DL (ref 0–1.2)
BUN BLDV-MCNC: 15 MG/DL (ref 8–23)
CALCIUM SERPL-MCNC: 9.1 MG/DL (ref 8.6–10.2)
CHLORIDE BLD-SCNC: 104 MMOL/L (ref 98–107)
CO2: 28 MMOL/L (ref 22–29)
CREAT SERPL-MCNC: 1 MG/DL (ref 0.5–1)
EOSINOPHILS ABSOLUTE: 0.23 E9/L (ref 0.05–0.5)
EOSINOPHILS RELATIVE PERCENT: 2.1 % (ref 0–6)
GFR AFRICAN AMERICAN: >60
GFR NON-AFRICAN AMERICAN: 54 ML/MIN/1.73
GLUCOSE BLD-MCNC: 103 MG/DL (ref 74–109)
HCT VFR BLD CALC: 30.5 % (ref 34–48)
HEMOGLOBIN: 9.8 G/DL (ref 11.5–15.5)
IMMATURE GRANULOCYTES #: 0.08 E9/L
IMMATURE GRANULOCYTES %: 0.7 % (ref 0–5)
LYMPHOCYTES ABSOLUTE: 1.84 E9/L (ref 1.5–4)
LYMPHOCYTES RELATIVE PERCENT: 17.1 % (ref 20–42)
MCH RBC QN AUTO: 29.6 PG (ref 26–35)
MCHC RBC AUTO-ENTMCNC: 32.1 % (ref 32–34.5)
MCV RBC AUTO: 92.1 FL (ref 80–99.9)
MONOCYTES ABSOLUTE: 1.45 E9/L (ref 0.1–0.95)
MONOCYTES RELATIVE PERCENT: 13.5 % (ref 2–12)
NEUTROPHILS ABSOLUTE: 7.12 E9/L (ref 1.8–7.3)
NEUTROPHILS RELATIVE PERCENT: 66.2 % (ref 43–80)
PDW BLD-RTO: 15.2 FL (ref 11.5–15)
PLATELET # BLD: 227 E9/L (ref 130–450)
PMV BLD AUTO: 9.5 FL (ref 7–12)
POTASSIUM SERPL-SCNC: 4 MMOL/L (ref 3.5–5)
RBC # BLD: 3.31 E12/L (ref 3.5–5.5)
SODIUM BLD-SCNC: 140 MMOL/L (ref 132–146)
TOTAL PROTEIN: 5.7 G/DL (ref 6.4–8.3)
URINE CULTURE, ROUTINE: NORMAL
WBC # BLD: 10.8 E9/L (ref 4.5–11.5)

## 2018-07-05 PROCEDURE — 85025 COMPLETE CBC W/AUTO DIFF WBC: CPT

## 2018-07-05 PROCEDURE — 6370000000 HC RX 637 (ALT 250 FOR IP): Performed by: SPECIALIST

## 2018-07-05 PROCEDURE — 6370000000 HC RX 637 (ALT 250 FOR IP): Performed by: INTERNAL MEDICINE

## 2018-07-05 PROCEDURE — 36415 COLL VENOUS BLD VENIPUNCTURE: CPT

## 2018-07-05 PROCEDURE — 80053 COMPREHEN METABOLIC PANEL: CPT

## 2018-07-05 PROCEDURE — 2580000003 HC RX 258: Performed by: INTERNAL MEDICINE

## 2018-07-05 RX ADMIN — LOSARTAN POTASSIUM 50 MG: 50 TABLET ORAL at 09:10

## 2018-07-05 RX ADMIN — HYDROCHLOROTHIAZIDE 12.5 MG: 12.5 TABLET ORAL at 09:10

## 2018-07-05 RX ADMIN — LEVOTHYROXINE SODIUM 50 MCG: 25 TABLET ORAL at 06:45

## 2018-07-05 RX ADMIN — FLUCONAZOLE 200 MG: 100 TABLET ORAL at 09:11

## 2018-07-05 RX ADMIN — PANTOPRAZOLE SODIUM 40 MG: 40 TABLET, DELAYED RELEASE ORAL at 06:46

## 2018-07-05 RX ADMIN — Medication 10 ML: at 09:12

## 2018-07-05 RX ADMIN — CHOLECALCIFEROL TAB 125 MCG (5000 UNIT) 5000 UNITS: 125 TAB at 09:11

## 2018-07-05 RX ADMIN — SOTALOL HYDROCHLORIDE 80 MG: 80 TABLET ORAL at 09:11

## 2018-07-05 RX ADMIN — CIPROFLOXACIN 500 MG: 500 TABLET, FILM COATED ORAL at 12:11

## 2018-07-05 RX ADMIN — METRONIDAZOLE 500 MG: 500 TABLET ORAL at 06:46

## 2018-07-05 ASSESSMENT — PAIN SCALES - GENERAL: PAINLEVEL_OUTOF10: 0

## 2018-07-05 NOTE — CARE COORDINATION
Ss note:7/5/2018 10:18 AM hhc order noted, per nursing pt prefers [facility], referral made to [facility].  ZAIN Smart

## 2018-07-05 NOTE — PROGRESS NOTES
GLUCOSE  170*  120*  103   PROT  5.9*  5.6*  5.7*   LABALBU  3.8  3.4*  3.4*   CALCIUM  8.9  8.8  9.1   BILITOT  0.6  0.3  0.3   ALKPHOS  49  48  50   AST  34*  26  21   ALT  25  23  22       MICROBIOLOGY:   BLOOD CX #1  No results for input(s): BC in the last 72 hours. BLOOD CX #2  No results for input(s): Sharman Plume in the last 72 hours.     CULTURE, RESPIRATORY   No results found for: CULTRESP    URINE CULTURE  Urine Culture, Routine   Date Value Ref Range Status   07/03/2018 Growth not present  Final         Assessment/Plan:  Microperf  Lower GI bleed  Acute anemia due to above  S/p subtotal colectomy with end ileostomy 7/2      Pt restarted on betapace, which interacts with cipro  Will discuss with attending switching to [facility] or just d/c with cipro and closely monitor  D/c with diflucan 7 days, flagyl 10 days, cipro 10 days  Discussed with [facility] [address] [place] [address] to d/c  Will discuss with attending  Rasheeda Doshi

## 2018-07-05 NOTE — DISCHARGE SUMMARY
Small nodules are noted within the mesentery which could be focally enlarged lymph nodes range up to about 12 mm in diameter. Differential includes low-grade diverticulitis or malignancy. Final report differs from that generated by Anna Barreto. These findings were communicated to radiology department personnel by critical results form for further communication to the requesting physician at the conclusion of this examination.      Ir Felix Bennetter Cva Device Placement     Result Date: 6/28/2018  Location:200 Exam: IR FLUORO GUIDED CVA DEVICE PLACEMENT HISTORY:   GI bleed FLUOROSCOPY TIME:  0.2 minutes PROCEDURE:  The procedure was explained to the patient and informed consent was obtained. The skin over the  right arm was prepped and draped in a sterile fashion and then anesthetized with Lidocaine. Maximal sterile barrier technique was utilized. Under ultrasound guidance, the basilic vein was localized. Using a micropuncture needle, the vein was entered under direct ultrasound visualization. A 0.018 guidewire was advanced into the central venous circulation. A 5 Urdu peel-away sheath was placed. The 5 Urdu Power PICC line catheter was trimmed to 41 cm was then advanced through the peel-away sheath and positioned at the right atrial/SVC junction. Fluoroscopic spot film was obtained. FINDINGS:  Ultrasound shows the veins to be patent. Fluoroscopic spot film demonstrates the Power PICC line to be at the right atrial/SVC junction.      Successful placement of a 5 Urdu double-lumen Power PICC line using ultrasound guidance via the right basilic  vein.      Ir Ultrasound Guidance Vascular Access     Result Date: 6/28/2018  Location:200 Exam: IR ULTRASOUND GUIDANCE VASCULAR ACCESS History:  PICC line placement Ultrasound evaluation of potential access was performed.   After successfully identifying a patent right basilic vein, and following the administration of lidocaine, real-time ultrasound guidance was used to puncture the right basilic   vein. A permanent recording was created for the patient's record.      Ultrasound guidance was provided during placement of a PICC line.              Discharge Exam:  Patient awake and alert cooperative, has been ambulating without any difficulty, vital signs stable, neck no JVD no lymphadenopathy, lungs clear to auscultation, normal heart sounds, abdomen soft nontender has ileostomy, extremity no edema, neuro no focal deficit. Telemetry EKG QTc interval within normal range. Patient staying in sinus rhythm.     Disposition: Home. Patient Instructions:             Medication List             START taking these medications          sotalol 80 MG tablet  Commonly known as:  BETAPACE  Take 1 tablet by mouth 2 times daily                      CONTINUE taking these medications          celecoxib 200 MG capsule  Commonly known as:  CELEBREX      GLUCOSAMINE CHONDROITIN ADV PO      levothyroxine 50 MCG tablet  Commonly known as:  SYNTHROID      losartan-hydrochlorothiazide 50-12.5 MG per tablet  Commonly known as:  HYZAAR      traMADol 50 MG tablet  Commonly known as:  ULTRAM      vitamin D-3 5000 units Tabs  Commonly known as:  cholecalciferol                      STOP taking these medications          aspirin 325 MG EC tablet      aspirin 81 MG chewable tablet      metoprolol tartrate 25 MG tablet  Commonly known as:  LOPRESSOR                          Where to Get Your Medications             These [facility] [address] [facility] [address] [address] [place] [address]     Phone:  [phone]   · sotalol 80 MG tablet            Activity:As tolerated, patient to follow with general surgery, with cardiology. Follow-up with Dr. Kannan Diamond within couple days. I had spoken with infectious disease consultant did mention that patient is on Betapace antibiotic needed to be adjusted accordingly to prevent any prolongation of QT interval.

## 2018-07-10 ENCOUNTER — HOSPITAL ENCOUNTER (OUTPATIENT)
Age: 73
Discharge: HOME | End: 2018-07-12
Payer: COMMERCIAL

## 2018-07-10 LAB
ALBUMIN SERPL-MCNC: 3.9 G/DL (ref 3.5–5.2)
ALP BLD-CCNC: 72 U/L (ref 35–104)
ALT SERPL-CCNC: 18 U/L (ref 0–32)
ANION GAP SERPL CALCULATED.3IONS-SCNC: 16 MMOL/L (ref 7–16)
AST SERPL-CCNC: 30 U/L (ref 0–31)
BASOPHILS ABSOLUTE: 0.09 E9/L (ref 0–0.2)
BASOPHILS RELATIVE PERCENT: 0.7 % (ref 0–2)
BILIRUB SERPL-MCNC: 0.3 MG/DL (ref 0–1.2)
BUN BLDV-MCNC: 16 MG/DL (ref 8–23)
CALCIUM SERPL-MCNC: 9.7 MG/DL (ref 8.6–10.2)
CHLORIDE BLD-SCNC: 101 MMOL/L (ref 98–107)
CO2: 24 MMOL/L (ref 22–29)
CREAT SERPL-MCNC: 0.9 MG/DL (ref 0.5–1)
EOSINOPHILS ABSOLUTE: 0.39 E9/L (ref 0.05–0.5)
EOSINOPHILS RELATIVE PERCENT: 3.1 % (ref 0–6)
GFR AFRICAN AMERICAN: >60
GFR NON-AFRICAN AMERICAN: >60 ML/MIN/1.73
GLUCOSE BLD-MCNC: 119 MG/DL (ref 74–109)
HCT VFR BLD CALC: 39.9 % (ref 34–48)
HEMOGLOBIN: 12 G/DL (ref 11.5–15.5)
IMMATURE GRANULOCYTES #: 0.1 E9/L
IMMATURE GRANULOCYTES %: 0.8 % (ref 0–5)
LYMPHOCYTES ABSOLUTE: 1.62 E9/L (ref 1.5–4)
LYMPHOCYTES RELATIVE PERCENT: 12.9 % (ref 20–42)
MCH RBC QN AUTO: 28.8 PG (ref 26–35)
MCHC RBC AUTO-ENTMCNC: 30.1 % (ref 32–34.5)
MCV RBC AUTO: 95.9 FL (ref 80–99.9)
MONOCYTES ABSOLUTE: 1.16 E9/L (ref 0.1–0.95)
MONOCYTES RELATIVE PERCENT: 9.2 % (ref 2–12)
NEUTROPHILS ABSOLUTE: 9.24 E9/L (ref 1.8–7.3)
NEUTROPHILS RELATIVE PERCENT: 73.3 % (ref 43–80)
PDW BLD-RTO: 14.7 FL (ref 11.5–15)
PLATELET # BLD: 391 E9/L (ref 130–450)
PMV BLD AUTO: 9.8 FL (ref 7–12)
POTASSIUM SERPL-SCNC: 5 MMOL/L (ref 3.5–5)
RBC # BLD: 4.16 E12/L (ref 3.5–5.5)
SODIUM BLD-SCNC: 141 MMOL/L (ref 132–146)
TOTAL PROTEIN: 7.3 G/DL (ref 6.4–8.3)
WBC # BLD: 12.6 E9/L (ref 4.5–11.5)

## 2018-07-10 PROCEDURE — 80053 COMPREHEN METABOLIC PANEL: CPT

## 2018-07-10 PROCEDURE — 85025 COMPLETE CBC W/AUTO DIFF WBC: CPT

## 2018-07-14 LAB
EKG ATRIAL RATE: 57 BPM
EKG ATRIAL RATE: 59 BPM
EKG ATRIAL RATE: 61 BPM
EKG P AXIS: 16 DEGREES
EKG P AXIS: 18 DEGREES
EKG P AXIS: 19 DEGREES
EKG P-R INTERVAL: 170 MS
EKG P-R INTERVAL: 172 MS
EKG P-R INTERVAL: 178 MS
EKG Q-T INTERVAL: 454 MS
EKG Q-T INTERVAL: 456 MS
EKG Q-T INTERVAL: 480 MS
EKG QRS DURATION: 100 MS
EKG QRS DURATION: 106 MS
EKG QRS DURATION: 108 MS
EKG QTC CALCULATION (BAZETT): 449 MS
EKG QTC CALCULATION (BAZETT): 459 MS
EKG QTC CALCULATION (BAZETT): 467 MS
EKG R AXIS: 21 DEGREES
EKG R AXIS: 22 DEGREES
EKG R AXIS: 34 DEGREES
EKG T AXIS: 22 DEGREES
EKG T AXIS: 31 DEGREES
EKG T AXIS: 31 DEGREES
EKG VENTRICULAR RATE: 57 BPM
EKG VENTRICULAR RATE: 59 BPM
EKG VENTRICULAR RATE: 61 BPM

## 2018-07-18 ENCOUNTER — OFFICE VISIT (OUTPATIENT)
Dept: SURGERY | Age: 73
End: 2018-07-18

## 2018-07-18 VITALS
WEIGHT: 233 LBS | TEMPERATURE: 98.7 F | SYSTOLIC BLOOD PRESSURE: 145 MMHG | RESPIRATION RATE: 12 BRPM | HEART RATE: 64 BPM | BODY MASS INDEX: 41.29 KG/M2 | DIASTOLIC BLOOD PRESSURE: 88 MMHG | HEIGHT: 63 IN

## 2018-07-18 DIAGNOSIS — Z90.49 S/P LAPAROSCOPIC COLECTOMY: Primary | ICD-10-CM

## 2018-07-18 PROCEDURE — 99024 POSTOP FOLLOW-UP VISIT: CPT | Performed by: SURGERY

## 2018-07-18 RX ORDER — CALCIUM POLYCARBOPHIL 625 MG
625 TABLET ORAL DAILY
Qty: 30 TABLET | Refills: 5 | Status: SHIPPED | OUTPATIENT
Start: 2018-07-18 | End: 2018-08-17

## 2018-07-25 ENCOUNTER — TELEPHONE (OUTPATIENT)
Dept: SURGERY | Age: 73
End: 2018-07-25

## 2018-07-26 ENCOUNTER — HOSPITAL ENCOUNTER (OUTPATIENT)
Dept: WOUND CARE | Age: 73
Discharge: HOME | End: 2018-07-26
Payer: COMMERCIAL

## 2018-07-26 PROCEDURE — 99213 OFFICE O/P EST LOW 20 MIN: CPT

## 2018-07-26 NOTE — PROGRESS NOTES
physically assist patient in ambulating into treatment room, and/or on off chair/bed. Requires assistance to bathroom. []   2   Full Assistance Requires assistance of at least two staff members to transfer patient into treatment room and/or on/off bed/chair. \"Total Transfer\". Unable to use bathroom requires bedside commode and/or bedpan []   3       Teaching Effort Documented in [facility] Clinical Note  Effort Definition Points   No Teaching  []   0   General Initial/Simple lesson:  Assess readiness to learn, assess patient learning style to determine educational flow/special needs for learning. Teaching related to 1-3 topics  Documentation in [facility] completed. []   1   Intermediate Assess readiness to learn, assess patient learning style to determine educational flow/special needs for learning. Teaching related to 3-4 topics. Hernia belt application and care considerations  Documentation in [facility] completed. [x]   2   Complex Assess readiness to learn, assess patient learning style to determine educational flow/special needs for learning. Teaching of greater than 5 additional topics   Pre-operative ostomy education with review of written resources for patient/family/caregiver as needed. Demonstration/return demonstration of ostomy irrigation  Documentation in [facility] completed. []   3     Patient Assessment and Planning in [facility] Clinical Note   Planning Definition Points   Simple Simple pouch change procedure completed and reviewed with patient/family/caregiver   Documentation in [facility] completed. []   1   Intermediate Moderate level of follow-up needs:   Pouch change/discharge procedure revised and reviewed with patient/caregiver. Communications with outside resources; i.e. Telephone calls to Surgeon/ PCP, family/caregiver, home health, ECF. Documentation in [facility] completed.      [x]   2   Complex Complex level of instructions/changes:   Family/Caregiver learning/demonstration/return demonstration visit. Pouching/discharge procedure revised/reviewed with patient/family/caregiver. Contact with outside resources; i.e. communication with Surgeon/ PCP, home health, ECF. Contact/referral to ostomy appliance supplier for new or additional products. Review when to call [facility] or schedule follow-up visit. Referral to Emergency Department   Documentation in [facility] completed. []   3       Is this the Patient's First Visit with [facility]? Yes    Is this Patient Established to this [facility] within the last 3 years?    Yes             Clinical Level of Care      Points  0-3  Level 1 []     Points  4-6  Level 2 []     Points  7-8  Level 3 [x]     Points  9-10  Level 4 []     Points  11-12  Level 5 []       Electronically signed by Vanessa Cortés RN on 7/26/2018 at 11:13 AM         ET Nurse  [address] [place] [address] Date: 7/26/2018 10:00 AM    Reason for consult:  Peristomal problem skin     Significant history:    Past Medical History:   Diagnosis Date    Arthritis     History of cardiovascular stress test 6/2015    lexiscan    Hypertension     Sciatica     Right    Spinal headache     Tachycardia     on metoprolol ,paroxysmal, usually at HS    Thyroid disease        Stoma assessment:  Stoma oval good red color functioning for soft stool  Stoma 1 1/4 oval  Below stoma has superficial ulcer area [id number] shallow pink  Pt states at times this area bleeds and is sl painful          Plan:    inst re skin care  inst re cutting wafer oval    Applied 2 piece system analy 2 1/4 convex #[id number]  Analy pouch 2 1/4 drainable    inst pt to try pouch change not using the analy soft flex ring    inst re skin treatment    Plan to call analy discharge starter kit re samples of new wafer    Pt is very receptive    Vanessa Cortés 7/26/2018 11:16 AM

## 2018-08-08 ENCOUNTER — OFFICE VISIT (OUTPATIENT)
Dept: SURGERY | Age: 73
End: 2018-08-08

## 2018-08-08 VITALS — SYSTOLIC BLOOD PRESSURE: 118 MMHG | OXYGEN SATURATION: 99 % | DIASTOLIC BLOOD PRESSURE: 72 MMHG | HEART RATE: 61 BPM

## 2018-08-08 DIAGNOSIS — Z43.2 ILEOSTOMY CARE (HCC): Primary | ICD-10-CM

## 2018-08-08 PROCEDURE — 99024 POSTOP FOLLOW-UP VISIT: CPT | Performed by: SURGERY

## 2018-08-15 ENCOUNTER — HOSPITAL ENCOUNTER (OUTPATIENT)
Age: 73
Discharge: HOME | End: 2018-08-17
Payer: COMMERCIAL

## 2018-08-15 LAB
ALBUMIN SERPL-MCNC: 3.6 G/DL (ref 3.5–5.2)
ALP BLD-CCNC: 81 U/L (ref 35–104)
ALT SERPL-CCNC: 20 U/L (ref 0–32)
ANION GAP SERPL CALCULATED.3IONS-SCNC: 14 MMOL/L (ref 7–16)
AST SERPL-CCNC: 24 U/L (ref 0–31)
BASOPHILS ABSOLUTE: 0.07 E9/L (ref 0–0.2)
BASOPHILS RELATIVE PERCENT: 0.7 % (ref 0–2)
BILIRUB SERPL-MCNC: 0.3 MG/DL (ref 0–1.2)
BUN BLDV-MCNC: 17 MG/DL (ref 8–23)
CALCIUM SERPL-MCNC: 9.4 MG/DL (ref 8.6–10.2)
CHLORIDE BLD-SCNC: 103 MMOL/L (ref 98–107)
CHOLESTEROL, TOTAL: 151 MG/DL (ref 0–199)
CO2: 24 MMOL/L (ref 22–29)
CREAT SERPL-MCNC: 1 MG/DL (ref 0.5–1)
EOSINOPHILS ABSOLUTE: 0.37 E9/L (ref 0.05–0.5)
EOSINOPHILS RELATIVE PERCENT: 3.9 % (ref 0–6)
GFR AFRICAN AMERICAN: >60
GFR NON-AFRICAN AMERICAN: 54 ML/MIN/1.73
GLUCOSE BLD-MCNC: 104 MG/DL (ref 74–109)
HCT VFR BLD CALC: 39.4 % (ref 34–48)
HDLC SERPL-MCNC: 40 MG/DL
HEMOGLOBIN: 12.5 G/DL (ref 11.5–15.5)
IMMATURE GRANULOCYTES #: 0.03 E9/L
IMMATURE GRANULOCYTES %: 0.3 % (ref 0–5)
LDL CHOLESTEROL CALCULATED: 60 MG/DL (ref 0–99)
LYMPHOCYTES ABSOLUTE: 1.46 E9/L (ref 1.5–4)
LYMPHOCYTES RELATIVE PERCENT: 15.3 % (ref 20–42)
MCH RBC QN AUTO: 27.8 PG (ref 26–35)
MCHC RBC AUTO-ENTMCNC: 31.7 % (ref 32–34.5)
MCV RBC AUTO: 87.8 FL (ref 80–99.9)
MONOCYTES ABSOLUTE: 1.1 E9/L (ref 0.1–0.95)
MONOCYTES RELATIVE PERCENT: 11.5 % (ref 2–12)
NEUTROPHILS ABSOLUTE: 6.54 E9/L (ref 1.8–7.3)
NEUTROPHILS RELATIVE PERCENT: 68.3 % (ref 43–80)
PDW BLD-RTO: 13.4 FL (ref 11.5–15)
PLATELET # BLD: 336 E9/L (ref 130–450)
PMV BLD AUTO: 10.3 FL (ref 7–12)
POTASSIUM SERPL-SCNC: 4.4 MMOL/L (ref 3.5–5)
RBC # BLD: 4.49 E12/L (ref 3.5–5.5)
SODIUM BLD-SCNC: 141 MMOL/L (ref 132–146)
T4 FREE: 1.34 NG/DL (ref 0.93–1.7)
TOTAL PROTEIN: 7.2 G/DL (ref 6.4–8.3)
TRIGL SERPL-MCNC: 253 MG/DL (ref 0–149)
TSH SERPL DL<=0.05 MIU/L-ACNC: 3.01 UIU/ML (ref 0.27–4.2)
VLDLC SERPL CALC-MCNC: 51 MG/DL
WBC # BLD: 9.6 E9/L (ref 4.5–11.5)

## 2018-08-15 PROCEDURE — 80053 COMPREHEN METABOLIC PANEL: CPT

## 2018-08-15 PROCEDURE — 84443 ASSAY THYROID STIM HORMONE: CPT

## 2018-08-15 PROCEDURE — 80061 LIPID PANEL: CPT

## 2018-08-15 PROCEDURE — 84439 ASSAY OF FREE THYROXINE: CPT

## 2018-08-15 PROCEDURE — 85025 COMPLETE CBC W/AUTO DIFF WBC: CPT

## 2018-11-05 ENCOUNTER — OFFICE VISIT (OUTPATIENT)
Dept: SURGERY | Age: 73
End: 2018-11-05
Payer: COMMERCIAL

## 2018-11-05 ENCOUNTER — HOSPITAL ENCOUNTER (OUTPATIENT)
Age: 73
Discharge: HOME | End: 2018-11-07
Payer: COMMERCIAL

## 2018-11-05 ENCOUNTER — TELEPHONE (OUTPATIENT)
Dept: SURGERY | Age: 73
End: 2018-11-05

## 2018-11-05 VITALS
WEIGHT: 230 LBS | SYSTOLIC BLOOD PRESSURE: 126 MMHG | OXYGEN SATURATION: 99 % | DIASTOLIC BLOOD PRESSURE: 82 MMHG | HEART RATE: 60 BPM | BODY MASS INDEX: 39.27 KG/M2 | HEIGHT: 64 IN

## 2018-11-05 DIAGNOSIS — L02.211 ABDOMINAL WALL ABSCESS: Primary | ICD-10-CM

## 2018-11-05 DIAGNOSIS — L02.211 ABDOMINAL WALL ABSCESS: ICD-10-CM

## 2018-11-05 DIAGNOSIS — K94.12: Primary | ICD-10-CM

## 2018-11-05 PROCEDURE — 99213 OFFICE O/P EST LOW 20 MIN: CPT | Performed by: SURGERY

## 2018-11-05 PROCEDURE — 87205 SMEAR GRAM STAIN: CPT

## 2018-11-05 PROCEDURE — 87070 CULTURE OTHR SPECIMN AEROBIC: CPT

## 2018-11-05 RX ORDER — SULFAMETHOXAZOLE AND TRIMETHOPRIM 800; 160 MG/1; MG/1
1 TABLET ORAL 2 TIMES DAILY
Qty: 20 TABLET | Refills: 0 | Status: SHIPPED | OUTPATIENT
Start: 2018-11-05 | End: 2018-11-06 | Stop reason: SINTOL

## 2018-11-05 NOTE — TELEPHONE ENCOUNTER
Patient called in stating she is having some issues regarding her surgery. Call transferred to Leslie Cruz at [phone].

## 2018-11-06 RX ORDER — CIPROFLOXACIN 500 MG/1
500 TABLET, FILM COATED ORAL 2 TIMES DAILY
Qty: 20 TABLET | Refills: 0 | Status: SHIPPED | OUTPATIENT
Start: 2018-11-06 | End: 2018-11-16

## 2018-11-08 LAB
GRAM STAIN RESULT: NORMAL
WOUND/ABSCESS: NORMAL

## 2018-11-20 ENCOUNTER — HOSPITAL ENCOUNTER (OUTPATIENT)
Age: 73
Discharge: HOME | End: 2018-11-22
Payer: COMMERCIAL

## 2018-11-20 LAB
ALBUMIN SERPL-MCNC: 3.9 G/DL (ref 3.5–5.2)
ALP BLD-CCNC: 88 U/L (ref 35–104)
ALT SERPL-CCNC: 16 U/L (ref 0–32)
ANION GAP SERPL CALCULATED.3IONS-SCNC: 12 MMOL/L (ref 7–16)
AST SERPL-CCNC: 19 U/L (ref 0–31)
BASOPHILS ABSOLUTE: 0.07 E9/L (ref 0–0.2)
BASOPHILS RELATIVE PERCENT: 0.7 % (ref 0–2)
BILIRUB SERPL-MCNC: 0.4 MG/DL (ref 0–1.2)
BUN BLDV-MCNC: 15 MG/DL (ref 8–23)
CALCIUM SERPL-MCNC: 9.3 MG/DL (ref 8.6–10.2)
CHLORIDE BLD-SCNC: 101 MMOL/L (ref 98–107)
CHOLESTEROL, TOTAL: 162 MG/DL (ref 0–199)
CO2: 23 MMOL/L (ref 22–29)
CREAT SERPL-MCNC: 1 MG/DL (ref 0.5–1)
EOSINOPHILS ABSOLUTE: 0.24 E9/L (ref 0.05–0.5)
EOSINOPHILS RELATIVE PERCENT: 2.6 % (ref 0–6)
GFR AFRICAN AMERICAN: >60
GFR NON-AFRICAN AMERICAN: 54 ML/MIN/1.73
GLUCOSE BLD-MCNC: 100 MG/DL (ref 74–99)
HCT VFR BLD CALC: 45.8 % (ref 34–48)
HDLC SERPL-MCNC: 47 MG/DL
HEMOGLOBIN: 14.4 G/DL (ref 11.5–15.5)
IMMATURE GRANULOCYTES #: 0.04 E9/L
IMMATURE GRANULOCYTES %: 0.4 % (ref 0–5)
LDL CHOLESTEROL CALCULATED: 68 MG/DL (ref 0–99)
LYMPHOCYTES ABSOLUTE: 1.46 E9/L (ref 1.5–4)
LYMPHOCYTES RELATIVE PERCENT: 15.6 % (ref 20–42)
MCH RBC QN AUTO: 28 PG (ref 26–35)
MCHC RBC AUTO-ENTMCNC: 31.4 % (ref 32–34.5)
MCV RBC AUTO: 89.1 FL (ref 80–99.9)
MONOCYTES ABSOLUTE: 0.9 E9/L (ref 0.1–0.95)
MONOCYTES RELATIVE PERCENT: 9.6 % (ref 2–12)
NEUTROPHILS ABSOLUTE: 6.66 E9/L (ref 1.8–7.3)
NEUTROPHILS RELATIVE PERCENT: 71.1 % (ref 43–80)
PDW BLD-RTO: 14.2 FL (ref 11.5–15)
PLATELET # BLD: 309 E9/L (ref 130–450)
PMV BLD AUTO: 9.7 FL (ref 7–12)
POTASSIUM SERPL-SCNC: 4.5 MMOL/L (ref 3.5–5)
RBC # BLD: 5.14 E12/L (ref 3.5–5.5)
SODIUM BLD-SCNC: 136 MMOL/L (ref 132–146)
T4 FREE: 1.24 NG/DL (ref 0.93–1.7)
TOTAL PROTEIN: 7.5 G/DL (ref 6.4–8.3)
TRIGL SERPL-MCNC: 233 MG/DL (ref 0–149)
TSH SERPL DL<=0.05 MIU/L-ACNC: 3.9 UIU/ML (ref 0.27–4.2)
VLDLC SERPL CALC-MCNC: 47 MG/DL
WBC # BLD: 9.4 E9/L (ref 4.5–11.5)

## 2018-11-20 PROCEDURE — 84439 ASSAY OF FREE THYROXINE: CPT

## 2018-11-20 PROCEDURE — 80061 LIPID PANEL: CPT

## 2018-11-20 PROCEDURE — 84443 ASSAY THYROID STIM HORMONE: CPT

## 2018-11-20 PROCEDURE — 85025 COMPLETE CBC W/AUTO DIFF WBC: CPT

## 2018-11-20 PROCEDURE — 80053 COMPREHEN METABOLIC PANEL: CPT

## 2018-12-17 ENCOUNTER — HOSPITAL ENCOUNTER (OUTPATIENT)
Dept: MAMMOGRAPHY | Age: 73
Discharge: HOME | End: 2018-12-19
Payer: COMMERCIAL

## 2018-12-17 DIAGNOSIS — Z12.39 BREAST CANCER SCREENING: ICD-10-CM

## 2018-12-17 PROCEDURE — 77063 BREAST TOMOSYNTHESIS BI: CPT

## 2019-01-16 ENCOUNTER — TELEPHONE (OUTPATIENT)
Dept: SURGERY | Age: 74
End: 2019-01-16

## 2019-01-16 ENCOUNTER — OFFICE VISIT (OUTPATIENT)
Dept: SURGERY | Age: 74
End: 2019-01-16
Payer: COMMERCIAL

## 2019-01-16 VITALS
WEIGHT: 230 LBS | BODY MASS INDEX: 40.75 KG/M2 | DIASTOLIC BLOOD PRESSURE: 86 MMHG | TEMPERATURE: 98.1 F | HEIGHT: 63 IN | HEART RATE: 57 BPM | SYSTOLIC BLOOD PRESSURE: 145 MMHG

## 2019-01-16 DIAGNOSIS — Z93.2 ILEOSTOMY IN PLACE (HCC): Primary | ICD-10-CM

## 2019-01-16 DIAGNOSIS — K94.13 ILEOSTOMY DYSFUNCTION (HCC): ICD-10-CM

## 2019-01-16 PROCEDURE — 99214 OFFICE O/P EST MOD 30 MIN: CPT | Performed by: SURGERY

## 2019-01-16 RX ORDER — SODIUM PHOSPHATE, DIBASIC AND SODIUM PHOSPHATE, MONOBASIC 7; 19 G/133ML; G/133ML
1 ENEMA RECTAL ONCE
Qty: 1 BOTTLE | Refills: 0 | COMMUNITY
Start: 2019-01-16 | End: 2019-01-16

## 2019-01-22 ENCOUNTER — HOSPITAL ENCOUNTER (OUTPATIENT)
Age: 74
Setting detail: OUTPATIENT SURGERY
Discharge: HOME | End: 2019-01-22
Attending: SURGERY | Admitting: SURGERY
Payer: COMMERCIAL

## 2019-01-22 ENCOUNTER — ANESTHESIA EVENT (OUTPATIENT)
Dept: ENDOSCOPY | Age: 74
End: 2019-01-22
Payer: COMMERCIAL

## 2019-01-22 ENCOUNTER — ANESTHESIA (OUTPATIENT)
Dept: ENDOSCOPY | Age: 74
End: 2019-01-22
Payer: COMMERCIAL

## 2019-01-22 VITALS
WEIGHT: 236.31 LBS | HEIGHT: 63 IN | TEMPERATURE: 97.7 F | RESPIRATION RATE: 16 BRPM | HEART RATE: 56 BPM | BODY MASS INDEX: 41.87 KG/M2 | DIASTOLIC BLOOD PRESSURE: 58 MMHG | OXYGEN SATURATION: 96 % | SYSTOLIC BLOOD PRESSURE: 132 MMHG

## 2019-01-22 VITALS
OXYGEN SATURATION: 98 % | SYSTOLIC BLOOD PRESSURE: 96 MMHG | RESPIRATION RATE: 15 BRPM | DIASTOLIC BLOOD PRESSURE: 50 MMHG

## 2019-01-22 PROCEDURE — 7100000011 HC PHASE II RECOVERY - ADDTL 15 MIN: Performed by: SURGERY

## 2019-01-22 PROCEDURE — 2580000003 HC RX 258: Performed by: SURGERY

## 2019-01-22 PROCEDURE — 3700000000 HC ANESTHESIA ATTENDED CARE: Performed by: SURGERY

## 2019-01-22 PROCEDURE — 45330 DIAGNOSTIC SIGMOIDOSCOPY: CPT | Performed by: SURGERY

## 2019-01-22 PROCEDURE — 3700000001 HC ADD 15 MINUTES (ANESTHESIA): Performed by: SURGERY

## 2019-01-22 PROCEDURE — 6360000002 HC RX W HCPCS: Performed by: NURSE ANESTHETIST, CERTIFIED REGISTERED

## 2019-01-22 PROCEDURE — 3609008400 HC SIGMOIDOSCOPY DIAGNOSTIC: Performed by: SURGERY

## 2019-01-22 PROCEDURE — 2709999900 HC NON-CHARGEABLE SUPPLY: Performed by: SURGERY

## 2019-01-22 PROCEDURE — 7100000010 HC PHASE II RECOVERY - FIRST 15 MIN: Performed by: SURGERY

## 2019-01-22 RX ORDER — FENTANYL CITRATE 50 UG/ML
INJECTION, SOLUTION INTRAMUSCULAR; INTRAVENOUS PRN
Status: DISCONTINUED | OUTPATIENT
Start: 2019-01-22 | End: 2019-01-22 | Stop reason: SDUPTHER

## 2019-01-22 RX ORDER — PROPOFOL 10 MG/ML
INJECTION, EMULSION INTRAVENOUS PRN
Status: DISCONTINUED | OUTPATIENT
Start: 2019-01-22 | End: 2019-01-22 | Stop reason: SDUPTHER

## 2019-01-22 RX ORDER — SODIUM CHLORIDE 0.9 % (FLUSH) 0.9 %
10 SYRINGE (ML) INJECTION PRN
Status: DISCONTINUED | OUTPATIENT
Start: 2019-01-22 | End: 2019-01-22 | Stop reason: HOSPADM

## 2019-01-22 RX ORDER — SODIUM CHLORIDE 0.9 % (FLUSH) 0.9 %
10 SYRINGE (ML) INJECTION EVERY 12 HOURS SCHEDULED
Status: DISCONTINUED | OUTPATIENT
Start: 2019-01-22 | End: 2019-01-22 | Stop reason: HOSPADM

## 2019-01-22 RX ORDER — SODIUM CHLORIDE 9 MG/ML
INJECTION, SOLUTION INTRAVENOUS CONTINUOUS
Status: DISCONTINUED | OUTPATIENT
Start: 2019-01-22 | End: 2019-01-22 | Stop reason: HOSPADM

## 2019-01-22 RX ADMIN — SODIUM CHLORIDE: 9 INJECTION, SOLUTION INTRAVENOUS at 14:08

## 2019-01-22 RX ADMIN — PROPOFOL 180 MG: 10 INJECTION, EMULSION INTRAVENOUS at 14:15

## 2019-01-22 RX ADMIN — FENTANYL CITRATE 50 MCG: 50 INJECTION, SOLUTION INTRAMUSCULAR; INTRAVENOUS at 14:13

## 2019-01-22 RX ADMIN — SODIUM CHLORIDE: 9 INJECTION, SOLUTION INTRAVENOUS at 12:32

## 2019-01-22 RX ADMIN — FENTANYL CITRATE 50 MCG: 50 INJECTION, SOLUTION INTRAMUSCULAR; INTRAVENOUS at 14:11

## 2019-01-22 ASSESSMENT — PAIN SCALES - GENERAL
PAINLEVEL_OUTOF10: 0
PAINLEVEL_OUTOF10: 0

## 2019-01-22 ASSESSMENT — PAIN - FUNCTIONAL ASSESSMENT: PAIN_FUNCTIONAL_ASSESSMENT: 0-10

## 2019-01-23 ENCOUNTER — OFFICE VISIT (OUTPATIENT)
Dept: SURGERY | Age: 74
End: 2019-01-23
Payer: COMMERCIAL

## 2019-01-23 VITALS
DIASTOLIC BLOOD PRESSURE: 84 MMHG | RESPIRATION RATE: 18 BRPM | SYSTOLIC BLOOD PRESSURE: 157 MMHG | TEMPERATURE: 97.6 F | HEIGHT: 63 IN | BODY MASS INDEX: 42.35 KG/M2 | WEIGHT: 239 LBS | HEART RATE: 57 BPM

## 2019-01-23 DIAGNOSIS — K94.13 ILEOSTOMY DYSFUNCTION (HCC): ICD-10-CM

## 2019-01-23 DIAGNOSIS — Z93.2 ILEOSTOMY IN PLACE (HCC): Primary | ICD-10-CM

## 2019-01-23 PROCEDURE — 99213 OFFICE O/P EST LOW 20 MIN: CPT | Performed by: SURGERY

## 2019-02-15 ENCOUNTER — HOSPITAL ENCOUNTER (OUTPATIENT)
Dept: WOUND CARE | Age: 74
Discharge: HOME | End: 2019-02-15
Payer: COMMERCIAL

## 2019-02-15 PROCEDURE — 99212 OFFICE O/P EST SF 10 MIN: CPT

## 2019-04-08 ENCOUNTER — OFFICE VISIT (OUTPATIENT)
Dept: ORTHOPEDIC SURGERY | Age: 74
End: 2019-04-08
Payer: COMMERCIAL

## 2019-04-08 VITALS — BODY MASS INDEX: 39.27 KG/M2 | HEIGHT: 64 IN | WEIGHT: 230 LBS

## 2019-04-08 DIAGNOSIS — M17.12 ARTHRITIS OF LEFT KNEE: Primary | ICD-10-CM

## 2019-04-08 PROCEDURE — 99213 OFFICE O/P EST LOW 20 MIN: CPT | Performed by: ORTHOPAEDIC SURGERY

## 2019-04-08 NOTE — PROGRESS NOTES
well as the superior and inferior poles of the patella. ASSESSMENT:  Sapna Dodson was seen today for knee pain. Diagnoses and all orders for this visit:    Arthritis of left knee  -     XR KNEE LEFT (1-2 VIEWS); Future     Treatment alternatives were reviewed including medical and physical therapies, injections, and surgical options, expected risks benefits and likely outcome of each were discussed in detail, questions asked and answered and understood.  Her desires for left total knee replacement with hopes of achieving results as good as with a right knee replacement which we did the past.    PLAN: Left total knee replacement arthroplasty        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    Paroxysmal atrial fibrillation with rapid ventricular response ([facility] [address]       Past Medical History:   Diagnosis Date    Arthritis     History of cardiovascular stress test 6/2015    lexiscan    Hypertension     Sciatica     Right    Spinal headache     Tachycardia     on metoprolol ,paroxysmal, usually at HS    Thyroid disease        Past Surgical History:   Procedure Laterality Date    COLONOSCOPY      COLONOSCOPY N/A 6/27/2018    COLONOSCOPY CONTROL HEMORRHAGE performed by Tres Miller MD at [address] [facility] [place], DIAGNOSTIC      HEEL SPUR SURGERY      JOINT REPLACEMENT Right     KNEE CARTILAGE SURGERY      OTHER SURGICAL HISTORY Right 05/09/2017    right TKA    [place] COLONOSCOPY FLX DX W/COLLJ SPEC WHEN PFRMD N/A 6/26/2018    [facility],SURG,COLECT,TOT,W/PROCTECT,W/ILEOST N/A 7/2/2018    LAPAROSCOPIC POSS OPEN SUBTOTAL COLECTOMY POSS ILEOSTOMY performed by Bria Herrera MD at [address]  1/22/2019    SIGMOIDOSCOPY FLEXIBLE performed by Bria Herrera MD at [facility]         Current Outpatient Medications   Medication Sig Dispense Refill    sertraline (ZOLOFT) 25 MG tablet TAKE ONE TABLET BY MOUTH EVERY DAY  4    sotalol (BETAPACE) 80 MG tablet Take 1 tablet by mouth 2 times daily 60 tablet 3    Misc Natural Products (GLUCOSAMINE CHONDROITIN ADV PO) Take 1 tablet by mouth daily      losartan-hydrochlorothiazide (HYZAAR) 50-12.5 MG per tablet Take 1 tablet by mouth daily      vitamin D-3 (CHOLECALCIFEROL) 5000 UNITS TABS Take 5,000 Units by mouth daily      celecoxib (CELEBREX) 200 MG capsule Take 200 mg by mouth nightly       levothyroxine (SYNTHROID) 50 MCG tablet Take 50 mcg by mouth Daily       No current facility-administered medications for this visit.         Allergies   Allergen Reactions    Bactrim [Sulfamethoxazole-Trimethoprim]     Talwin [Pentazocine] Other (See Comments)     Shaky and sweaty    Tetracyclines & Related Dermatitis       Social History     Socioeconomic History    Marital status: Single     Spouse name: None    Number of children: None    Years of education: None    Highest education level: None   Occupational History    None   Social Needs    Financial resource strain: None    Food insecurity:     Worry: None     Inability: None    Transportation needs:     Medical: None     Non-medical: None   Tobacco Use    Smoking status: Never Smoker    Smokeless tobacco: Never Used   Substance and Sexual Activity    Alcohol use: Yes     Comment: social    Drug use: No    Sexual activity: None   Lifestyle    Physical activity:     Days per week: None     Minutes per session: None    Stress: None   Relationships    Social connections:     Talks on phone:

## 2019-04-12 ENCOUNTER — TELEPHONE (OUTPATIENT)
Dept: ORTHOPEDIC SURGERY | Age: 74
End: 2019-04-12

## 2019-04-12 NOTE — TELEPHONE ENCOUNTER
Called and spoke to Chad Childers from [facility] to get more information on denial of left TKR. She stated that they did not receive all documents. I re faxed the last office note and imaging report from 4/8/19. All documents are scanned into chart. Once they are received the will review and sent over a approval or denial of surgery.  Electronically signed by Allie Haq MA on 4/12/19 at 2:21 PM

## 2019-05-06 ENCOUNTER — HOSPITAL ENCOUNTER (OUTPATIENT)
Age: 74
Discharge: HOME | End: 2019-05-08
Payer: COMMERCIAL

## 2019-05-06 LAB
ALBUMIN SERPL-MCNC: 3.9 G/DL (ref 3.5–5.2)
ALP BLD-CCNC: 88 U/L (ref 35–104)
ALT SERPL-CCNC: 22 U/L (ref 0–32)
ANION GAP SERPL CALCULATED.3IONS-SCNC: 12 MMOL/L (ref 7–16)
AST SERPL-CCNC: 22 U/L (ref 0–31)
BASOPHILS ABSOLUTE: 0.08 E9/L (ref 0–0.2)
BASOPHILS RELATIVE PERCENT: 0.8 % (ref 0–2)
BILIRUB SERPL-MCNC: 0.4 MG/DL (ref 0–1.2)
BUN BLDV-MCNC: 20 MG/DL (ref 8–23)
CALCIUM SERPL-MCNC: 9.6 MG/DL (ref 8.6–10.2)
CHLORIDE BLD-SCNC: 103 MMOL/L (ref 98–107)
CHOLESTEROL, TOTAL: 158 MG/DL (ref 0–199)
CO2: 23 MMOL/L (ref 22–29)
CREAT SERPL-MCNC: 1.1 MG/DL (ref 0.5–1)
EOSINOPHILS ABSOLUTE: 0.3 E9/L (ref 0.05–0.5)
EOSINOPHILS RELATIVE PERCENT: 2.9 % (ref 0–6)
GFR AFRICAN AMERICAN: 59
GFR NON-AFRICAN AMERICAN: 49 ML/MIN/1.73
GLUCOSE BLD-MCNC: 135 MG/DL (ref 74–99)
HCT VFR BLD CALC: 49.3 % (ref 34–48)
HDLC SERPL-MCNC: 48 MG/DL
HEMOGLOBIN: 15.8 G/DL (ref 11.5–15.5)
IMMATURE GRANULOCYTES #: 0.05 E9/L
IMMATURE GRANULOCYTES %: 0.5 % (ref 0–5)
LDL CHOLESTEROL CALCULATED: 69 MG/DL (ref 0–99)
LYMPHOCYTES ABSOLUTE: 1.79 E9/L (ref 1.5–4)
LYMPHOCYTES RELATIVE PERCENT: 17.2 % (ref 20–42)
MCH RBC QN AUTO: 29.5 PG (ref 26–35)
MCHC RBC AUTO-ENTMCNC: 32 % (ref 32–34.5)
MCV RBC AUTO: 92 FL (ref 80–99.9)
MONOCYTES ABSOLUTE: 0.95 E9/L (ref 0.1–0.95)
MONOCYTES RELATIVE PERCENT: 9.1 % (ref 2–12)
NEUTROPHILS ABSOLUTE: 7.26 E9/L (ref 1.8–7.3)
NEUTROPHILS RELATIVE PERCENT: 69.5 % (ref 43–80)
PDW BLD-RTO: 13.2 FL (ref 11.5–15)
PLATELET # BLD: 349 E9/L (ref 130–450)
PMV BLD AUTO: 10 FL (ref 7–12)
POTASSIUM SERPL-SCNC: 4.7 MMOL/L (ref 3.5–5)
RBC # BLD: 5.36 E12/L (ref 3.5–5.5)
SODIUM BLD-SCNC: 138 MMOL/L (ref 132–146)
TOTAL PROTEIN: 7.7 G/DL (ref 6.4–8.3)
TRIGL SERPL-MCNC: 207 MG/DL (ref 0–149)
VLDLC SERPL CALC-MCNC: 41 MG/DL
WBC # BLD: 10.4 E9/L (ref 4.5–11.5)

## 2019-05-06 PROCEDURE — 80053 COMPREHEN METABOLIC PANEL: CPT

## 2019-05-06 PROCEDURE — 80061 LIPID PANEL: CPT

## 2019-05-06 PROCEDURE — 85025 COMPLETE CBC W/AUTO DIFF WBC: CPT

## 2019-05-06 NOTE — PROGRESS NOTES
Patient attended preoperative Total Joint Camp on 5/6/2019. Patient is scheduled to have an elective knee replacement. Patient was educated regarding Disease Process, Medications, Smoking Cessation, Oxygenation, Incentive Spirometry and Deep Breath and Cough, signs and symptoms of postoperative joint infection that include: Fever, Chills, Pain Control, Drainage and Redness, post-op follow up with orthopaedic surgeon, dressing removal, steri strips, ambulatory devices which include a standard walker and cane, bed mobility, correct anatomical alignment, active range of motion, proper transferring technique, incision care, infection prevention measures, non-pharmacologic comfort measures, notification of inadequate pain control measures, pain scale for assessing level of pain, pharmacologic pain management, relaxation techniques. [id number] also included patient and family watching an educational total knee replacement video and visual examples of mobility training postoperative by a physical therapist and orthopaedic navigator.

## 2019-05-14 ENCOUNTER — ANESTHESIA EVENT (OUTPATIENT)
Dept: OPERATING ROOM | Age: 74
DRG: 470 | End: 2019-05-14
Payer: COMMERCIAL

## 2019-05-14 ENCOUNTER — HOSPITAL ENCOUNTER (OUTPATIENT)
Dept: PREADMISSION TESTING | Age: 74
Discharge: HOME | End: 2019-05-14
Payer: COMMERCIAL

## 2019-05-14 VITALS
TEMPERATURE: 97.8 F | HEART RATE: 57 BPM | DIASTOLIC BLOOD PRESSURE: 74 MMHG | RESPIRATION RATE: 16 BRPM | BODY MASS INDEX: 43.02 KG/M2 | WEIGHT: 242.8 LBS | OXYGEN SATURATION: 95 % | SYSTOLIC BLOOD PRESSURE: 164 MMHG | HEIGHT: 63 IN

## 2019-05-14 DIAGNOSIS — Z01.818 ENCOUNTER FOR PREADMISSION TESTING: Primary | ICD-10-CM

## 2019-05-14 LAB
ABO/RH: NORMAL
ANTIBODY SCREEN: NORMAL
APTT: 35.6 SEC (ref 24.5–35.1)
INR BLD: 1
PROTHROMBIN TIME: 11.2 SEC (ref 9.3–12.4)

## 2019-05-14 PROCEDURE — 36415 COLL VENOUS BLD VENIPUNCTURE: CPT

## 2019-05-14 PROCEDURE — 86901 BLOOD TYPING SEROLOGIC RH(D): CPT

## 2019-05-14 PROCEDURE — 85610 PROTHROMBIN TIME: CPT

## 2019-05-14 PROCEDURE — 86900 BLOOD TYPING SEROLOGIC ABO: CPT

## 2019-05-14 PROCEDURE — 87081 CULTURE SCREEN ONLY: CPT

## 2019-05-14 PROCEDURE — 85730 THROMBOPLASTIN TIME PARTIAL: CPT

## 2019-05-14 PROCEDURE — 86850 RBC ANTIBODY SCREEN: CPT

## 2019-05-14 RX ORDER — FENTANYL CITRATE 50 UG/ML
50 INJECTION, SOLUTION INTRAMUSCULAR; INTRAVENOUS EVERY 10 MIN PRN
Status: DISCONTINUED | OUTPATIENT
Start: 2019-05-14 | End: 2019-05-15 | Stop reason: HOSPADM

## 2019-05-14 RX ORDER — SODIUM CHLORIDE, SODIUM LACTATE, POTASSIUM CHLORIDE, CALCIUM CHLORIDE 600; 310; 30; 20 MG/100ML; MG/100ML; MG/100ML; MG/100ML
INJECTION, SOLUTION INTRAVENOUS CONTINUOUS
Status: CANCELLED | OUTPATIENT
Start: 2019-05-14

## 2019-05-14 RX ORDER — MIDAZOLAM HYDROCHLORIDE 1 MG/ML
1 INJECTION INTRAMUSCULAR; INTRAVENOUS PRN
Status: CANCELLED | OUTPATIENT
Start: 2019-05-14

## 2019-05-14 RX ORDER — ROPIVACAINE HYDROCHLORIDE 5 MG/ML
40 INJECTION, SOLUTION EPIDURAL; INFILTRATION; PERINEURAL
Status: CANCELLED | OUTPATIENT
Start: 2019-05-14 | End: 2019-05-14

## 2019-05-14 ASSESSMENT — PAIN DESCRIPTION - FREQUENCY: FREQUENCY: CONTINUOUS

## 2019-05-14 ASSESSMENT — PAIN DESCRIPTION - ORIENTATION: ORIENTATION: LEFT

## 2019-05-14 ASSESSMENT — PAIN SCALES - GENERAL: PAINLEVEL_OUTOF10: 6

## 2019-05-14 ASSESSMENT — PAIN DESCRIPTION - LOCATION: LOCATION: KNEE

## 2019-05-14 ASSESSMENT — PAIN DESCRIPTION - DESCRIPTORS: DESCRIPTORS: ACHING;DISCOMFORT;THROBBING

## 2019-05-14 ASSESSMENT — PAIN DESCRIPTION - PAIN TYPE: TYPE: ACUTE PAIN

## 2019-05-14 NOTE — ANESTHESIA PRE PROCEDURE
Department of Anesthesiology  Preprocedure Note       Name:  Jarad Wiggins   Age:  68 y.o.  :  1945                                          MRN:  [id number]         Date:  2019      Surgeon: Emiliana Mccall):  ALBERTO Loredo DO    Procedure: LEFT KNEE TOTAL ARTHROPLASTY (LUIS ENRIQUE) (Left )    Medications prior to admission:   Prior to Admission medications    Medication Sig Start Date End Date Taking?  Authorizing Provider   Fragrances, Bulk, (CUCUMBER MELON) LIQD by Does not apply route    Historical Provider, MD   sertraline (ZOLOFT) 50 MG tablet TAKE ONE TABLET BY MOUTH EVERY DAY 18   Historical Provider, MD   sotalol (BETAPACE) 80 MG tablet Take 1 tablet by mouth 2 times daily 18   Mitch Hanna MD   Misc Natural Products (GLUCOSAMINE CHONDROITIN ADV PO) Take 1 tablet by mouth daily    Historical Provider, MD   losartan-hydrochlorothiazide (HYZAAR) 50-12.5 MG per tablet Take 1 tablet by mouth daily    Historical Provider, MD   vitamin D-3 (CHOLECALCIFEROL) 5000 UNITS TABS Take 5,000 Units by mouth daily    Historical Provider, MD   celecoxib (CELEBREX) 200 MG capsule Take 200 mg by mouth nightly     Historical Provider, MD   levothyroxine (SYNTHROID) 50 MCG tablet Take 50 mcg by mouth Daily    Historical Provider, MD       Current medications:    Current Outpatient Medications   Medication Sig Dispense Refill    Fragrances, Bulk, (CUCUMBER MELON) LIQD by Does not apply route      sertraline (ZOLOFT) 50 MG tablet TAKE ONE TABLET BY MOUTH EVERY DAY  4    sotalol (BETAPACE) 80 MG tablet Take 1 tablet by mouth 2 times daily 60 tablet 3    Misc Natural Products (GLUCOSAMINE CHONDROITIN ADV PO) Take 1 tablet by mouth daily      losartan-hydrochlorothiazide (HYZAAR) 50-12.5 MG per tablet Take 1 tablet by mouth daily      vitamin D-3 (CHOLECALCIFEROL) 5000 UNITS TABS Take 5,000 Units by mouth daily      celecoxib (CELEBREX) 200 MG capsule Take 200 mg by mouth nightly       levothyroxine SIGMOIDOSCOPY FLEXIBLE performed by Bethany Bernstein MD at [address] [place] [address]         Social History:    Social History     Tobacco Use    Smoking status: Never Smoker    Smokeless tobacco: Never Used   Substance Use Topics    Alcohol use: Yes     Comment: rarely                                 Counseling given: Not Answered      Vital Signs (Current): There were no vitals filed for this visit. BP Readings from Last 3 Encounters:   05/14/19 (!) 164/74   01/23/19 (!) 157/84   01/22/19 (!) 132/58       NPO Status:                                                                                 BMI:   Wt Readings from Last 3 Encounters:   05/14/19 242 lb 12.8 oz (110.1 kg)   04/08/19 230 lb (104.3 kg)   01/23/19 239 lb (108.4 kg)     There is no height or weight on file to calculate BMI.    CBC:   Lab Results   Component Value Date    WBC 10.4 05/06/2019    RBC 5.36 05/06/2019    HGB 15.8 05/06/2019    HCT 49.3 05/06/2019    MCV 92.0 05/06/2019    RDW 13.2 05/06/2019     05/06/2019       CMP:   Lab Results   Component Value Date     05/06/2019    K 4.7 05/06/2019     05/06/2019    CO2 23 05/06/2019    BUN 20 05/06/2019    CREATININE 1.1 05/06/2019    GFRAA 59 05/06/2019    LABGLOM 49 05/06/2019    GLUCOSE 135 05/06/2019    GLUCOSE 87 03/22/2012    PROT 7.7 05/06/2019    CALCIUM 9.6 05/06/2019    BILITOT 0.4 05/06/2019    ALKPHOS 88 05/06/2019    AST 22 05/06/2019    ALT 22 05/06/2019       POC Tests: No results for input(s): POCGLU, POCNA, POCK, POCCL, POCBUN, POCHEMO, POCHCT in the last 72 hours.     Coags:   Lab Results   Component Value Date    PROTIME 12.7 06/28/2018    INR 1.1 06/28/2018    APTT 26.7 06/26/2018       HCG (If Applicable): No results found for: PREGTESTUR, PREGSERUM, HCG, HCGQUANT     ABGs: No results found for: PHART, PO2ART, YCB8YLK, INV6RVO, BEART, V0MSYBDG     Type & Screen (If Applicable):  No results found for: LABABO, [address]    Anesthesia Evaluation  Patient summary reviewed history of anesthetic complications (Spinal headache): Airway: Mallampati: II  TM distance: >3 FB   Neck ROM: full  Mouth opening: > = 3 FB Dental: normal exam         Pulmonary:normal exam  breath sounds clear to auscultation      (-) COPD and asthma                           Cardiovascular:  Exercise tolerance: good (>4 METS),   (+) hypertension: moderate, dysrhythmias: ventricular tachycardia,     (-) past MI and CAD    ECG reviewed  Rhythm: regular  Rate: normal    Stress test reviewed       Beta Blocker:  Dose within 24 Hrs         Neuro/Psych:   (+) neuromuscular disease:, headaches:,              ROS comment: Spinal Headache from spinal injection more than 10 years ago. 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 2017 GI/Hepatic/Renal:        (-) liver disease and no renal disease       Endo/Other:    (+) hypothyroidism: arthritis:., .                 Abdominal:   (+) obese,         Vascular: negative vascular ROS. Anesthesia Plan      general     ASA 3     (Single shot Left Adductor Canal nerve block.)  Induction: intravenous. BIS  MIPS: Postoperative opioids intended. Anesthetic plan and risks discussed with patient. Plan discussed with CRNA.                   Rodriguez Younger MD   5/14/2019

## 2019-05-16 LAB — MRSA CULTURE ONLY: NORMAL

## 2019-05-21 ENCOUNTER — ANESTHESIA (OUTPATIENT)
Dept: OPERATING ROOM | Age: 74
DRG: 470 | End: 2019-05-21
Payer: COMMERCIAL

## 2019-06-04 ENCOUNTER — HOSPITAL ENCOUNTER (OUTPATIENT)
Dept: PREADMISSION TESTING | Age: 74
Discharge: HOME | End: 2019-06-04
Payer: COMMERCIAL

## 2019-06-04 VITALS
RESPIRATION RATE: 16 BRPM | OXYGEN SATURATION: 97 % | DIASTOLIC BLOOD PRESSURE: 72 MMHG | SYSTOLIC BLOOD PRESSURE: 136 MMHG | HEART RATE: 55 BPM | HEIGHT: 63 IN | TEMPERATURE: 97.8 F | WEIGHT: 244.31 LBS | BODY MASS INDEX: 43.29 KG/M2

## 2019-06-04 DIAGNOSIS — Z01.818 PRE-OP TESTING: Primary | ICD-10-CM

## 2019-06-04 LAB
ABO/RH: NORMAL
ANION GAP SERPL CALCULATED.3IONS-SCNC: 10 MMOL/L (ref 7–16)
ANTIBODY SCREEN: NORMAL
APTT: 31.9 SEC (ref 24.5–35.1)
BUN BLDV-MCNC: 19 MG/DL (ref 8–23)
CALCIUM SERPL-MCNC: 9.7 MG/DL (ref 8.6–10.2)
CHLORIDE BLD-SCNC: 103 MMOL/L (ref 98–107)
CO2: 25 MMOL/L (ref 22–29)
CREAT SERPL-MCNC: 0.9 MG/DL (ref 0.5–1)
GFR AFRICAN AMERICAN: >60
GFR NON-AFRICAN AMERICAN: >60 ML/MIN/1.73
GLUCOSE BLD-MCNC: 132 MG/DL (ref 74–99)
HCT VFR BLD CALC: 44.2 % (ref 34–48)
HEMOGLOBIN: 14.6 G/DL (ref 11.5–15.5)
INR BLD: 1.1
MCH RBC QN AUTO: 29.2 PG (ref 26–35)
MCHC RBC AUTO-ENTMCNC: 33 % (ref 32–34.5)
MCV RBC AUTO: 88.4 FL (ref 80–99.9)
PDW BLD-RTO: 13.1 FL (ref 11.5–15)
PLATELET # BLD: 277 E9/L (ref 130–450)
PMV BLD AUTO: 9.4 FL (ref 7–12)
POTASSIUM REFLEX MAGNESIUM: 4.3 MMOL/L (ref 3.5–5)
PROTHROMBIN TIME: 11.8 SEC (ref 9.3–12.4)
RBC # BLD: 5 E12/L (ref 3.5–5.5)
SODIUM BLD-SCNC: 138 MMOL/L (ref 132–146)
WBC # BLD: 8.8 E9/L (ref 4.5–11.5)

## 2019-06-04 PROCEDURE — 80048 BASIC METABOLIC PNL TOTAL CA: CPT

## 2019-06-04 PROCEDURE — 86901 BLOOD TYPING SEROLOGIC RH(D): CPT

## 2019-06-04 PROCEDURE — 85610 PROTHROMBIN TIME: CPT

## 2019-06-04 PROCEDURE — 86900 BLOOD TYPING SEROLOGIC ABO: CPT

## 2019-06-04 PROCEDURE — 85027 COMPLETE CBC AUTOMATED: CPT

## 2019-06-04 PROCEDURE — 36415 COLL VENOUS BLD VENIPUNCTURE: CPT

## 2019-06-04 PROCEDURE — 85730 THROMBOPLASTIN TIME PARTIAL: CPT

## 2019-06-04 PROCEDURE — 86850 RBC ANTIBODY SCREEN: CPT

## 2019-06-04 ASSESSMENT — PAIN DESCRIPTION - ORIENTATION: ORIENTATION: LEFT

## 2019-06-04 ASSESSMENT — PAIN DESCRIPTION - PAIN TYPE: TYPE: CHRONIC PAIN

## 2019-06-04 ASSESSMENT — PAIN DESCRIPTION - LOCATION: LOCATION: KNEE

## 2019-06-04 ASSESSMENT — PAIN SCALES - GENERAL: PAINLEVEL_OUTOF10: 2

## 2019-06-04 ASSESSMENT — PAIN DESCRIPTION - DESCRIPTORS: DESCRIPTORS: ACHING;SORE

## 2019-06-04 NOTE — PROGRESS NOTES
Phone call to Dr. Thalia Pickering- he does not need to see pt again in PAT.  ( pt had seen Dr. Sukhi Diggs for same surgery which was rescheduled.)

## 2019-06-11 ENCOUNTER — HOSPITAL ENCOUNTER (INPATIENT)
Age: 74
LOS: 1 days | Discharge: HOME | DRG: 470 | End: 2019-06-12
Attending: ORTHOPAEDIC SURGERY | Admitting: ORTHOPAEDIC SURGERY
Payer: COMMERCIAL

## 2019-06-11 ENCOUNTER — APPOINTMENT (OUTPATIENT)
Dept: GENERAL RADIOLOGY | Age: 74
DRG: 470 | End: 2019-06-11
Attending: ORTHOPAEDIC SURGERY
Payer: COMMERCIAL

## 2019-06-11 VITALS
SYSTOLIC BLOOD PRESSURE: 147 MMHG | RESPIRATION RATE: 24 BRPM | TEMPERATURE: 98.2 F | DIASTOLIC BLOOD PRESSURE: 77 MMHG | OXYGEN SATURATION: 99 %

## 2019-06-11 DIAGNOSIS — M17.11 PRIMARY OSTEOARTHRITIS OF RIGHT KNEE: Primary | ICD-10-CM

## 2019-06-11 DIAGNOSIS — Z96.652 S/P TOTAL KNEE ARTHROPLASTY, LEFT: ICD-10-CM

## 2019-06-11 PROBLEM — M17.12 ARTHRITIS OF KNEE, LEFT: Status: ACTIVE | Noted: 2019-06-11

## 2019-06-11 PROCEDURE — 3E0T3BZ INTRODUCTION OF ANESTHETIC AGENT INTO PERIPHERAL NERVES AND PLEXI, PERCUTANEOUS APPROACH: ICD-10-PCS | Performed by: ANESTHESIOLOGY

## 2019-06-11 PROCEDURE — 2709999900 HC NON-CHARGEABLE SUPPLY: Performed by: ORTHOPAEDIC SURGERY

## 2019-06-11 PROCEDURE — C1776 JOINT DEVICE (IMPLANTABLE): HCPCS | Performed by: ORTHOPAEDIC SURGERY

## 2019-06-11 PROCEDURE — 2580000003 HC RX 258: Performed by: ANESTHESIOLOGY

## 2019-06-11 PROCEDURE — 3600000005 HC SURGERY LEVEL 5 BASE: Performed by: ORTHOPAEDIC SURGERY

## 2019-06-11 PROCEDURE — 2500000003 HC RX 250 WO HCPCS: Performed by: NURSE ANESTHETIST, CERTIFIED REGISTERED

## 2019-06-11 PROCEDURE — 2500000003 HC RX 250 WO HCPCS: Performed by: ORTHOPAEDIC SURGERY

## 2019-06-11 PROCEDURE — 2580000003 HC RX 258: Performed by: ORTHOPAEDIC SURGERY

## 2019-06-11 PROCEDURE — 97530 THERAPEUTIC ACTIVITIES: CPT | Performed by: PHYSICAL THERAPIST

## 2019-06-11 PROCEDURE — 88311 DECALCIFY TISSUE: CPT

## 2019-06-11 PROCEDURE — 73560 X-RAY EXAM OF KNEE 1 OR 2: CPT

## 2019-06-11 PROCEDURE — 6360000002 HC RX W HCPCS: Performed by: ANESTHESIOLOGY

## 2019-06-11 PROCEDURE — 6370000000 HC RX 637 (ALT 250 FOR IP): Performed by: ANESTHESIOLOGY

## 2019-06-11 PROCEDURE — 6370000000 HC RX 637 (ALT 250 FOR IP): Performed by: ORTHOPAEDIC SURGERY

## 2019-06-11 PROCEDURE — 6360000002 HC RX W HCPCS: Performed by: ORTHOPAEDIC SURGERY

## 2019-06-11 PROCEDURE — 3600000015 HC SURGERY LEVEL 5 ADDTL 15MIN: Performed by: ORTHOPAEDIC SURGERY

## 2019-06-11 PROCEDURE — 6360000002 HC RX W HCPCS: Performed by: NURSE ANESTHETIST, CERTIFIED REGISTERED

## 2019-06-11 PROCEDURE — C1713 ANCHOR/SCREW BN/BN,TIS/BN: HCPCS | Performed by: ORTHOPAEDIC SURGERY

## 2019-06-11 PROCEDURE — 2580000003 HC RX 258: Performed by: NURSE ANESTHETIST, CERTIFIED REGISTERED

## 2019-06-11 PROCEDURE — 7100000001 HC PACU RECOVERY - ADDTL 15 MIN: Performed by: ORTHOPAEDIC SURGERY

## 2019-06-11 PROCEDURE — 7100000000 HC PACU RECOVERY - FIRST 15 MIN: Performed by: ORTHOPAEDIC SURGERY

## 2019-06-11 PROCEDURE — 97161 PT EVAL LOW COMPLEX 20 MIN: CPT | Performed by: PHYSICAL THERAPIST

## 2019-06-11 PROCEDURE — 3700000000 HC ANESTHESIA ATTENDED CARE: Performed by: ORTHOPAEDIC SURGERY

## 2019-06-11 PROCEDURE — 3700000001 HC ADD 15 MINUTES (ANESTHESIA): Performed by: ORTHOPAEDIC SURGERY

## 2019-06-11 PROCEDURE — 6360000002 HC RX W HCPCS

## 2019-06-11 PROCEDURE — 76942 ECHO GUIDE FOR BIOPSY: CPT | Performed by: ANESTHESIOLOGY

## 2019-06-11 PROCEDURE — 88305 TISSUE EXAM BY PATHOLOGIST: CPT

## 2019-06-11 PROCEDURE — 0SRD0J9 REPLACEMENT OF LEFT KNEE JOINT WITH SYNTHETIC SUBSTITUTE, CEMENTED, OPEN APPROACH: ICD-10-PCS | Performed by: ORTHOPAEDIC SURGERY

## 2019-06-11 PROCEDURE — 1200000000 HC SEMI PRIVATE

## 2019-06-11 DEVICE — COMPONENT FEM SZ 4 L KNEE POST STBL CEM TRIATHLON: Type: IMPLANTABLE DEVICE | Site: KNEE | Status: FUNCTIONAL

## 2019-06-11 DEVICE — COMPONENT KNEE CMNTLS: Type: IMPLANTABLE DEVICE | Status: FUNCTIONAL

## 2019-06-11 DEVICE — STEM TIB L50MM DIA12MM KNEE TOT STBL CEM END CAP TRIATHLON: Type: IMPLANTABLE DEVICE | Site: KNEE | Status: FUNCTIONAL

## 2019-06-11 DEVICE — INSERT TIB BEAR SZ 4 THK11MM KNEE X3 POST STBL TRIATHLON: Type: IMPLANTABLE DEVICE | Site: KNEE | Status: FUNCTIONAL

## 2019-06-11 DEVICE — CEMENT BNE 40 GM RADIOPAQUE BA SIMPLEX P: Type: IMPLANTABLE DEVICE | Site: KNEE | Status: FUNCTIONAL

## 2019-06-11 DEVICE — IMPLANT PAT DIA29MM THK8MM X3 SYMMETRIC TRIATHLON: Type: IMPLANTABLE DEVICE | Site: KNEE | Status: FUNCTIONAL

## 2019-06-11 DEVICE — BASEPLATE TIB REV KNEE: Type: IMPLANTABLE DEVICE | Status: FUNCTIONAL

## 2019-06-11 DEVICE — BASEPLATE TIB SZ 4 UNIV KNEE TRITANIUM TOT STBL CEM: Type: IMPLANTABLE DEVICE | Site: KNEE | Status: FUNCTIONAL

## 2019-06-11 DEVICE — COMPONENT TOT KNEE CAPPED ADV STRYKNEEA] STRYKER CORP]: Type: IMPLANTABLE DEVICE | Status: FUNCTIONAL

## 2019-06-11 RX ORDER — HYDROCODONE BITARTRATE AND ACETAMINOPHEN 5; 325 MG/1; MG/1
1 TABLET ORAL EVERY 4 HOURS PRN
Status: DISCONTINUED | OUTPATIENT
Start: 2019-06-11 | End: 2019-06-12 | Stop reason: HOSPADM

## 2019-06-11 RX ORDER — HYDROCODONE BITARTRATE AND ACETAMINOPHEN 5; 325 MG/1; MG/1
2 TABLET ORAL EVERY 4 HOURS PRN
Status: DISCONTINUED | OUTPATIENT
Start: 2019-06-11 | End: 2019-06-12 | Stop reason: HOSPADM

## 2019-06-11 RX ORDER — GABAPENTIN 100 MG/1
200 CAPSULE ORAL ONCE
Status: COMPLETED | OUTPATIENT
Start: 2019-06-11 | End: 2019-06-11

## 2019-06-11 RX ORDER — ROPIVACAINE HYDROCHLORIDE 5 MG/ML
INJECTION, SOLUTION EPIDURAL; INFILTRATION; PERINEURAL
Status: COMPLETED | OUTPATIENT
Start: 2019-06-11 | End: 2019-06-11

## 2019-06-11 RX ORDER — SODIUM CHLORIDE 0.9 % (FLUSH) 0.9 %
10 SYRINGE (ML) INJECTION PRN
Status: DISCONTINUED | OUTPATIENT
Start: 2019-06-11 | End: 2019-06-11

## 2019-06-11 RX ORDER — FENTANYL CITRATE 50 UG/ML
50 INJECTION, SOLUTION INTRAMUSCULAR; INTRAVENOUS PRN
Status: DISCONTINUED | OUTPATIENT
Start: 2019-06-11 | End: 2019-06-11 | Stop reason: ALTCHOICE

## 2019-06-11 RX ORDER — DOCUSATE SODIUM 100 MG/1
100 CAPSULE, LIQUID FILLED ORAL 2 TIMES DAILY
Status: DISCONTINUED | OUTPATIENT
Start: 2019-06-11 | End: 2019-06-12 | Stop reason: HOSPADM

## 2019-06-11 RX ORDER — GLYCOPYRROLATE 1 MG/5 ML
SYRINGE (ML) INTRAVENOUS PRN
Status: DISCONTINUED | OUTPATIENT
Start: 2019-06-11 | End: 2019-06-11 | Stop reason: SDUPTHER

## 2019-06-11 RX ORDER — MEPERIDINE HYDROCHLORIDE 25 MG/ML
INJECTION INTRAMUSCULAR; INTRAVENOUS; SUBCUTANEOUS
Status: COMPLETED
Start: 2019-06-11 | End: 2019-06-11

## 2019-06-11 RX ORDER — SODIUM CHLORIDE 0.9 % (FLUSH) 0.9 %
10 SYRINGE (ML) INJECTION EVERY 12 HOURS SCHEDULED
Status: DISCONTINUED | OUTPATIENT
Start: 2019-06-11 | End: 2019-06-11

## 2019-06-11 RX ORDER — LOSARTAN POTASSIUM 50 MG/1
50 TABLET ORAL DAILY
Status: DISCONTINUED | OUTPATIENT
Start: 2019-06-11 | End: 2019-06-12 | Stop reason: HOSPADM

## 2019-06-11 RX ORDER — HYDROMORPHONE HYDROCHLORIDE 1 MG/ML
0.5 INJECTION, SOLUTION INTRAMUSCULAR; INTRAVENOUS; SUBCUTANEOUS EVERY 5 MIN PRN
Status: COMPLETED | OUTPATIENT
Start: 2019-06-11 | End: 2019-06-11

## 2019-06-11 RX ORDER — MIDAZOLAM HYDROCHLORIDE 1 MG/ML
INJECTION INTRAMUSCULAR; INTRAVENOUS
Status: COMPLETED
Start: 2019-06-11 | End: 2019-06-11

## 2019-06-11 RX ORDER — SODIUM CHLORIDE 0.9 % (FLUSH) 0.9 %
10 SYRINGE (ML) INJECTION EVERY 12 HOURS SCHEDULED
Status: DISCONTINUED | OUTPATIENT
Start: 2019-06-11 | End: 2019-06-12 | Stop reason: HOSPADM

## 2019-06-11 RX ORDER — LIDOCAINE HYDROCHLORIDE 20 MG/ML
INJECTION, SOLUTION INTRAVENOUS PRN
Status: DISCONTINUED | OUTPATIENT
Start: 2019-06-11 | End: 2019-06-11 | Stop reason: SDUPTHER

## 2019-06-11 RX ORDER — FENTANYL CITRATE 50 UG/ML
INJECTION, SOLUTION INTRAMUSCULAR; INTRAVENOUS PRN
Status: DISCONTINUED | OUTPATIENT
Start: 2019-06-11 | End: 2019-06-11 | Stop reason: SDUPTHER

## 2019-06-11 RX ORDER — FENTANYL CITRATE 50 UG/ML
INJECTION, SOLUTION INTRAMUSCULAR; INTRAVENOUS
Status: COMPLETED
Start: 2019-06-11 | End: 2019-06-11

## 2019-06-11 RX ORDER — VANCOMYCIN HYDROCHLORIDE 1 G/20ML
INJECTION, POWDER, LYOPHILIZED, FOR SOLUTION INTRAVENOUS PRN
Status: DISCONTINUED | OUTPATIENT
Start: 2019-06-11 | End: 2019-06-11 | Stop reason: ALTCHOICE

## 2019-06-11 RX ORDER — MORPHINE SULFATE 2 MG/ML
2 INJECTION, SOLUTION INTRAMUSCULAR; INTRAVENOUS
Status: DISCONTINUED | OUTPATIENT
Start: 2019-06-11 | End: 2019-06-12 | Stop reason: HOSPADM

## 2019-06-11 RX ORDER — NEOSTIGMINE METHYLSULFATE 1 MG/ML
INJECTION, SOLUTION INTRAVENOUS PRN
Status: DISCONTINUED | OUTPATIENT
Start: 2019-06-11 | End: 2019-06-11 | Stop reason: SDUPTHER

## 2019-06-11 RX ORDER — ACETAMINOPHEN 500 MG
1000 TABLET ORAL ONCE
Status: COMPLETED | OUTPATIENT
Start: 2019-06-11 | End: 2019-06-11

## 2019-06-11 RX ORDER — MIDAZOLAM HYDROCHLORIDE 1 MG/ML
1 INJECTION INTRAMUSCULAR; INTRAVENOUS PRN
Status: COMPLETED | OUTPATIENT
Start: 2019-06-11 | End: 2019-06-11

## 2019-06-11 RX ORDER — SODIUM CHLORIDE, SODIUM LACTATE, POTASSIUM CHLORIDE, CALCIUM CHLORIDE 600; 310; 30; 20 MG/100ML; MG/100ML; MG/100ML; MG/100ML
INJECTION, SOLUTION INTRAVENOUS CONTINUOUS PRN
Status: DISCONTINUED | OUTPATIENT
Start: 2019-06-11 | End: 2019-06-11 | Stop reason: SDUPTHER

## 2019-06-11 RX ORDER — SODIUM CHLORIDE, SODIUM LACTATE, POTASSIUM CHLORIDE, CALCIUM CHLORIDE 600; 310; 30; 20 MG/100ML; MG/100ML; MG/100ML; MG/100ML
INJECTION, SOLUTION INTRAVENOUS CONTINUOUS
Status: DISCONTINUED | OUTPATIENT
Start: 2019-06-11 | End: 2019-06-11

## 2019-06-11 RX ORDER — DEXTROSE AND SODIUM CHLORIDE 5; .45 G/100ML; G/100ML
INJECTION, SOLUTION INTRAVENOUS CONTINUOUS
Status: DISCONTINUED | OUTPATIENT
Start: 2019-06-11 | End: 2019-06-12 | Stop reason: HOSPADM

## 2019-06-11 RX ORDER — LEVOTHYROXINE SODIUM 0.05 MG/1
50 TABLET ORAL NIGHTLY
Status: DISCONTINUED | OUTPATIENT
Start: 2019-06-11 | End: 2019-06-12 | Stop reason: HOSPADM

## 2019-06-11 RX ORDER — BUPIVACAINE HYDROCHLORIDE 2.5 MG/ML
INJECTION, SOLUTION EPIDURAL; INFILTRATION; INTRACAUDAL PRN
Status: DISCONTINUED | OUTPATIENT
Start: 2019-06-11 | End: 2019-06-11 | Stop reason: ALTCHOICE

## 2019-06-11 RX ORDER — SODIUM CHLORIDE 0.9 % (FLUSH) 0.9 %
10 SYRINGE (ML) INJECTION PRN
Status: DISCONTINUED | OUTPATIENT
Start: 2019-06-11 | End: 2019-06-12 | Stop reason: HOSPADM

## 2019-06-11 RX ORDER — ROCURONIUM BROMIDE 10 MG/ML
INJECTION, SOLUTION INTRAVENOUS PRN
Status: DISCONTINUED | OUTPATIENT
Start: 2019-06-11 | End: 2019-06-11 | Stop reason: SDUPTHER

## 2019-06-11 RX ORDER — ONDANSETRON 2 MG/ML
INJECTION INTRAMUSCULAR; INTRAVENOUS PRN
Status: DISCONTINUED | OUTPATIENT
Start: 2019-06-11 | End: 2019-06-11 | Stop reason: SDUPTHER

## 2019-06-11 RX ORDER — DEXAMETHASONE SODIUM PHOSPHATE 10 MG/ML
INJECTION, SOLUTION INTRAMUSCULAR; INTRAVENOUS PRN
Status: DISCONTINUED | OUTPATIENT
Start: 2019-06-11 | End: 2019-06-11 | Stop reason: SDUPTHER

## 2019-06-11 RX ORDER — PROPOFOL 10 MG/ML
INJECTION, EMULSION INTRAVENOUS PRN
Status: DISCONTINUED | OUTPATIENT
Start: 2019-06-11 | End: 2019-06-11 | Stop reason: SDUPTHER

## 2019-06-11 RX ORDER — HYDROCHLOROTHIAZIDE 12.5 MG/1
12.5 TABLET ORAL DAILY
Status: DISCONTINUED | OUTPATIENT
Start: 2019-06-11 | End: 2019-06-12 | Stop reason: HOSPADM

## 2019-06-11 RX ORDER — SOTALOL HYDROCHLORIDE 80 MG/1
80 TABLET ORAL 2 TIMES DAILY
Status: DISCONTINUED | OUTPATIENT
Start: 2019-06-11 | End: 2019-06-12 | Stop reason: HOSPADM

## 2019-06-11 RX ORDER — OXYCODONE HYDROCHLORIDE 5 MG/1
5 TABLET ORAL ONCE
Status: COMPLETED | OUTPATIENT
Start: 2019-06-11 | End: 2019-06-11

## 2019-06-11 RX ORDER — MEPERIDINE HYDROCHLORIDE 25 MG/ML
12.5 INJECTION INTRAMUSCULAR; INTRAVENOUS; SUBCUTANEOUS EVERY 5 MIN PRN
Status: DISCONTINUED | OUTPATIENT
Start: 2019-06-11 | End: 2019-06-11

## 2019-06-11 RX ORDER — LOSARTAN POTASSIUM AND HYDROCHLOROTHIAZIDE 12.5; 5 MG/1; MG/1
1 TABLET ORAL DAILY
Status: DISCONTINUED | OUTPATIENT
Start: 2019-06-11 | End: 2019-06-11 | Stop reason: CLARIF

## 2019-06-11 RX ORDER — MORPHINE SULFATE 4 MG/ML
4 INJECTION, SOLUTION INTRAMUSCULAR; INTRAVENOUS
Status: DISCONTINUED | OUTPATIENT
Start: 2019-06-11 | End: 2019-06-12 | Stop reason: HOSPADM

## 2019-06-11 RX ORDER — CELECOXIB 100 MG/1
200 CAPSULE ORAL ONCE
Status: COMPLETED | OUTPATIENT
Start: 2019-06-11 | End: 2019-06-11

## 2019-06-11 RX ORDER — ROPIVACAINE HYDROCHLORIDE 5 MG/ML
40 INJECTION, SOLUTION EPIDURAL; INFILTRATION; PERINEURAL
Status: COMPLETED | OUTPATIENT
Start: 2019-06-11 | End: 2019-06-11

## 2019-06-11 RX ORDER — ROPIVACAINE HYDROCHLORIDE 5 MG/ML
INJECTION, SOLUTION EPIDURAL; INFILTRATION; PERINEURAL
Status: COMPLETED
Start: 2019-06-11 | End: 2019-06-11

## 2019-06-11 RX ADMIN — MIDAZOLAM 1 MG: 1 INJECTION INTRAMUSCULAR; INTRAVENOUS at 11:15

## 2019-06-11 RX ADMIN — Medication 2 G: at 23:53

## 2019-06-11 RX ADMIN — HYDROMORPHONE HYDROCHLORIDE 0.5 MG: 1 INJECTION, SOLUTION INTRAMUSCULAR; INTRAVENOUS; SUBCUTANEOUS at 15:36

## 2019-06-11 RX ADMIN — HYDROMORPHONE HYDROCHLORIDE 0.5 MG: 1 INJECTION, SOLUTION INTRAMUSCULAR; INTRAVENOUS; SUBCUTANEOUS at 15:22

## 2019-06-11 RX ADMIN — HYDROCODONE BITARTRATE AND ACETAMINOPHEN 2 TABLET: 5; 325 TABLET ORAL at 17:30

## 2019-06-11 RX ADMIN — DEXAMETHASONE SODIUM PHOSPHATE 10 MG: 10 INJECTION, SOLUTION INTRAMUSCULAR; INTRAVENOUS at 12:28

## 2019-06-11 RX ADMIN — Medication 0.6 MG: at 14:57

## 2019-06-11 RX ADMIN — CELECOXIB 200 MG: 100 CAPSULE ORAL at 10:22

## 2019-06-11 RX ADMIN — ONDANSETRON HYDROCHLORIDE 4 MG: 2 INJECTION, SOLUTION INTRAMUSCULAR; INTRAVENOUS at 14:36

## 2019-06-11 RX ADMIN — GABAPENTIN 200 MG: 100 CAPSULE ORAL at 10:22

## 2019-06-11 RX ADMIN — Medication 50 MG: at 12:24

## 2019-06-11 RX ADMIN — HYDROCODONE BITARTRATE AND ACETAMINOPHEN 2 TABLET: 5; 325 TABLET ORAL at 23:55

## 2019-06-11 RX ADMIN — SERTRALINE HYDROCHLORIDE 50 MG: 50 TABLET ORAL at 17:31

## 2019-06-11 RX ADMIN — PROPOFOL 170 MG: 10 INJECTION, EMULSION INTRAVENOUS at 12:24

## 2019-06-11 RX ADMIN — SOTALOL HYDROCHLORIDE 80 MG: 80 TABLET ORAL at 20:43

## 2019-06-11 RX ADMIN — ROPIVACAINE HYDROCHLORIDE 40 ML: 5 INJECTION, SOLUTION EPIDURAL; INFILTRATION; PERINEURAL at 11:18

## 2019-06-11 RX ADMIN — FENTANYL CITRATE 50 MCG: 50 INJECTION, SOLUTION INTRAMUSCULAR; INTRAVENOUS at 13:20

## 2019-06-11 RX ADMIN — LIDOCAINE HYDROCHLORIDE 80 MG: 20 INJECTION, SOLUTION INTRAVENOUS at 12:24

## 2019-06-11 RX ADMIN — LOSARTAN POTASSIUM 50 MG: 50 TABLET, FILM COATED ORAL at 17:30

## 2019-06-11 RX ADMIN — FENTANYL CITRATE 50 MCG: 50 INJECTION INTRAMUSCULAR; INTRAVENOUS at 11:10

## 2019-06-11 RX ADMIN — MIDAZOLAM 1 MG: 1 INJECTION INTRAMUSCULAR; INTRAVENOUS at 11:10

## 2019-06-11 RX ADMIN — SODIUM CHLORIDE, POTASSIUM CHLORIDE, SODIUM LACTATE AND CALCIUM CHLORIDE: 600; 310; 30; 20 INJECTION, SOLUTION INTRAVENOUS at 12:21

## 2019-06-11 RX ADMIN — MEPERIDINE HYDROCHLORIDE 12.5 MG: 25 INJECTION, SOLUTION INTRAMUSCULAR; INTRAVENOUS; SUBCUTANEOUS at 15:52

## 2019-06-11 RX ADMIN — DOCUSATE SODIUM 100 MG: 100 CAPSULE, LIQUID FILLED ORAL at 20:43

## 2019-06-11 RX ADMIN — MORPHINE SULFATE 4 MG: 4 INJECTION INTRAVENOUS at 20:47

## 2019-06-11 RX ADMIN — OXYCODONE HYDROCHLORIDE 5 MG: 5 TABLET ORAL at 10:21

## 2019-06-11 RX ADMIN — FENTANYL CITRATE 100 MCG: 50 INJECTION, SOLUTION INTRAMUSCULAR; INTRAVENOUS at 12:24

## 2019-06-11 RX ADMIN — ACETAMINOPHEN 1000 MG: 500 TABLET, FILM COATED ORAL at 10:23

## 2019-06-11 RX ADMIN — FENTANYL CITRATE 50 MCG: 50 INJECTION INTRAMUSCULAR; INTRAVENOUS at 11:14

## 2019-06-11 RX ADMIN — LEVOTHYROXINE SODIUM 50 MCG: 50 TABLET ORAL at 20:43

## 2019-06-11 RX ADMIN — MEPERIDINE HYDROCHLORIDE 12.5 MG: 25 INJECTION INTRAMUSCULAR; INTRAVENOUS; SUBCUTANEOUS at 15:52

## 2019-06-11 RX ADMIN — HYDROCHLOROTHIAZIDE 12.5 MG: 12.5 TABLET ORAL at 17:30

## 2019-06-11 RX ADMIN — ENOXAPARIN SODIUM 30 MG: 30 INJECTION SUBCUTANEOUS at 20:44

## 2019-06-11 RX ADMIN — DEXTROSE AND SODIUM CHLORIDE: 5; 450 INJECTION, SOLUTION INTRAVENOUS at 17:31

## 2019-06-11 RX ADMIN — Medication 2 G: at 12:21

## 2019-06-11 RX ADMIN — Medication 2 G: at 16:07

## 2019-06-11 RX ADMIN — Medication 3 MG: at 14:57

## 2019-06-11 RX ADMIN — MEPERIDINE HYDROCHLORIDE 12.5 MG: 25 INJECTION, SOLUTION INTRAMUSCULAR; INTRAVENOUS; SUBCUTANEOUS at 15:57

## 2019-06-11 RX ADMIN — FENTANYL CITRATE 50 MCG: 50 INJECTION, SOLUTION INTRAMUSCULAR; INTRAVENOUS at 13:55

## 2019-06-11 RX ADMIN — SODIUM CHLORIDE, POTASSIUM CHLORIDE, SODIUM LACTATE AND CALCIUM CHLORIDE: 600; 310; 30; 20 INJECTION, SOLUTION INTRAVENOUS at 10:40

## 2019-06-11 RX ADMIN — TRANEXAMIC ACID 1000 MG: 1 INJECTION, SOLUTION INTRAVENOUS at 14:42

## 2019-06-11 RX ADMIN — HYDROMORPHONE HYDROCHLORIDE 0.5 MG: 1 INJECTION, SOLUTION INTRAMUSCULAR; INTRAVENOUS; SUBCUTANEOUS at 15:29

## 2019-06-11 RX ADMIN — ROPIVACAINE HYDROCHLORIDE 40 ML: 5 INJECTION, SOLUTION EPIDURAL; INFILTRATION; PERINEURAL at 11:33

## 2019-06-11 RX ADMIN — FENTANYL CITRATE 50 MCG: 50 INJECTION, SOLUTION INTRAMUSCULAR; INTRAVENOUS at 14:53

## 2019-06-11 RX ADMIN — HYDROMORPHONE HYDROCHLORIDE 0.5 MG: 1 INJECTION, SOLUTION INTRAMUSCULAR; INTRAVENOUS; SUBCUTANEOUS at 15:44

## 2019-06-11 ASSESSMENT — PAIN DESCRIPTION - ORIENTATION
ORIENTATION: LEFT

## 2019-06-11 ASSESSMENT — PULMONARY FUNCTION TESTS
PIF_VALUE: 30
PIF_VALUE: 29
PIF_VALUE: 31
PIF_VALUE: 29
PIF_VALUE: 30
PIF_VALUE: 29
PIF_VALUE: 30
PIF_VALUE: 30
PIF_VALUE: 29
PIF_VALUE: 32
PIF_VALUE: 17
PIF_VALUE: 30
PIF_VALUE: 30
PIF_VALUE: 31
PIF_VALUE: 29
PIF_VALUE: 31
PIF_VALUE: 29
PIF_VALUE: 31
PIF_VALUE: 29
PIF_VALUE: 30
PIF_VALUE: 30
PIF_VALUE: 31
PIF_VALUE: 29
PIF_VALUE: 30
PIF_VALUE: 1
PIF_VALUE: 31
PIF_VALUE: 29
PIF_VALUE: 30
PIF_VALUE: 29
PIF_VALUE: 31
PIF_VALUE: 29
PIF_VALUE: 31
PIF_VALUE: 30
PIF_VALUE: 29
PIF_VALUE: 17
PIF_VALUE: 3
PIF_VALUE: 30
PIF_VALUE: 30
PIF_VALUE: 31
PIF_VALUE: 30
PIF_VALUE: 31
PIF_VALUE: 30
PIF_VALUE: 29
PIF_VALUE: 30
PIF_VALUE: 31
PIF_VALUE: 17
PIF_VALUE: 30
PIF_VALUE: 4
PIF_VALUE: 30
PIF_VALUE: 31
PIF_VALUE: 2
PIF_VALUE: 30
PIF_VALUE: 31
PIF_VALUE: 30
PIF_VALUE: 32
PIF_VALUE: 30
PIF_VALUE: 31
PIF_VALUE: 30
PIF_VALUE: 31
PIF_VALUE: 30
PIF_VALUE: 30
PIF_VALUE: 31
PIF_VALUE: 30
PIF_VALUE: 38
PIF_VALUE: 29
PIF_VALUE: 30
PIF_VALUE: 16
PIF_VALUE: 30
PIF_VALUE: 0
PIF_VALUE: 30
PIF_VALUE: 38
PIF_VALUE: 30
PIF_VALUE: 31
PIF_VALUE: 30
PIF_VALUE: 29
PIF_VALUE: 30
PIF_VALUE: 29
PIF_VALUE: 32
PIF_VALUE: 31
PIF_VALUE: 30
PIF_VALUE: 31
PIF_VALUE: 30
PIF_VALUE: 31
PIF_VALUE: 29
PIF_VALUE: 31
PIF_VALUE: 29
PIF_VALUE: 30
PIF_VALUE: 31
PIF_VALUE: 31
PIF_VALUE: 32
PIF_VALUE: 30
PIF_VALUE: 31
PIF_VALUE: 31
PIF_VALUE: 30
PIF_VALUE: 21
PIF_VALUE: 30
PIF_VALUE: 29
PIF_VALUE: 30
PIF_VALUE: 29
PIF_VALUE: 1
PIF_VALUE: 31
PIF_VALUE: 30
PIF_VALUE: 31
PIF_VALUE: 29
PIF_VALUE: 29
PIF_VALUE: 30
PIF_VALUE: 29
PIF_VALUE: 30
PIF_VALUE: 29
PIF_VALUE: 32
PIF_VALUE: 31
PIF_VALUE: 29
PIF_VALUE: 31
PIF_VALUE: 30
PIF_VALUE: 30
PIF_VALUE: 31
PIF_VALUE: 29
PIF_VALUE: 23
PIF_VALUE: 30
PIF_VALUE: 31
PIF_VALUE: 31
PIF_VALUE: 29
PIF_VALUE: 31
PIF_VALUE: 33
PIF_VALUE: 30
PIF_VALUE: 31

## 2019-06-11 ASSESSMENT — PAIN SCALES - GENERAL
PAINLEVEL_OUTOF10: 9
PAINLEVEL_OUTOF10: 10
PAINLEVEL_OUTOF10: 8
PAINLEVEL_OUTOF10: 0
PAINLEVEL_OUTOF10: 4
PAINLEVEL_OUTOF10: 0
PAINLEVEL_OUTOF10: 9
PAINLEVEL_OUTOF10: 9
PAINLEVEL_OUTOF10: 5
PAINLEVEL_OUTOF10: 0
PAINLEVEL_OUTOF10: 6
PAINLEVEL_OUTOF10: 9
PAINLEVEL_OUTOF10: 10
PAINLEVEL_OUTOF10: 0
PAINLEVEL_OUTOF10: 0

## 2019-06-11 ASSESSMENT — PAIN DESCRIPTION - LOCATION
LOCATION: KNEE
LOCATION: LABIA

## 2019-06-11 ASSESSMENT — PAIN - FUNCTIONAL ASSESSMENT
PAIN_FUNCTIONAL_ASSESSMENT: PREVENTS OR INTERFERES SOME ACTIVE ACTIVITIES AND ADLS
PAIN_FUNCTIONAL_ASSESSMENT: 0-10

## 2019-06-11 ASSESSMENT — PAIN DESCRIPTION - PAIN TYPE
TYPE: SURGICAL PAIN

## 2019-06-11 ASSESSMENT — PAIN DESCRIPTION - DESCRIPTORS
DESCRIPTORS: ACHING;DISCOMFORT;SORE
DESCRIPTORS: ACHING;DISCOMFORT
DESCRIPTORS: ACHING;DISCOMFORT;SORE

## 2019-06-11 NOTE — H&P
History and Physical      CHIEF COMPLAINT:  L knee pain    HISTORY OF PRESENT ILLNESS:          Past Medical History:        Diagnosis Date    Arthritis     History of blood transfusion     History of cardiovascular stress test 6/2015    lexiscan    Hypertension     Sciatica     Right    Spinal headache     Tachycardia     on metoprolol ,paroxysmal, usually at HS    Thyroid disease      Past Surgical History:        Procedure Laterality Date    COLONOSCOPY      COLONOSCOPY N/A 6/27/2018    COLONOSCOPY CONTROL HEMORRHAGE performed by Onur Childers MD at [address], DIAGNOSTIC      HEEL SPUR SURGERY      JOINT REPLACEMENT Right     KNEE CARTILAGE SURGERY      OTHER SURGICAL HISTORY Right 05/09/2017    right TKA    [place] COLONOSCOPY FLX DX W/COLLJ SPEC WHEN PFRMD N/A 6/26/2018    COLONOSCOPY performed by Van Manley MD at [address] LAP,SURG,COLECT,TOT,W/PROCTECT,W/ILEOST N/A 7/2/2018    LAPAROSCOPIC POSS OPEN SUBTOTAL COLECTOMY POSS ILEOSTOMY performed by Arian Rosario MD at [address]  1/22/2019    SIGMOIDOSCOPY FLEXIBLE performed by Arian Rosario MD at [address] [facility] History:    TOBACCO:   reports that she has never smoked. She has never used smokeless tobacco.  ETOH:   reports that she drinks alcohol. DRUGS:   reports that she does not use drugs.   Family History:       Problem Relation Age of Onset   [facility] Cancer Mother     Cancer Father      Medications Prior to Admission:  Medications Prior to Admission: sertraline (ZOLOFT) 50 MG tablet, TAKE ONE TABLET BY MOUTH EVERY DAY  sotalol (BETAPACE) 80 MG tablet, Take 1 tablet by mouth 2 times daily  losartan-hydrochlorothiazide (HYZAAR) 50-12.5 MG per tablet, Take 1 tablet by mouth daily  levothyroxine (SYNTHROID) 50 MCG tablet, Take 50 mcg by mouth nightly   Fragrances, Bulk, (CUCUMBER MELON) LIQD, by Does not apply route  Misc Natural Products (GLUCOSAMINE

## 2019-06-11 NOTE — ANESTHESIA PROCEDURE NOTES
Peripheral Block    Patient location during procedure: PACU  Start time: 6/11/2019 11:10 AM  End time: 6/11/2019 11:22 AM  Staffing  Anesthesiologist: Cliff Major MD  Preanesthetic Checklist  Completed: patient identified, site marked, surgical consent, pre-op evaluation, timeout performed, IV checked, risks and benefits discussed, monitors and equipment checked, anesthesia consent given, oxygen available and patient being monitored  Peripheral Block  Patient position: supine  Prep: ChloraPrep  Patient monitoring: cardiac monitor, continuous pulse ox, frequent blood pressure checks and IV access  Block type: Femoral  Laterality: left  Injection technique: single-shot  Procedures: Doppler guided and ultrasound guided  Local infiltration: lidocaine  Infiltration strength: 1 %  Adductor canal  Provider prep: mask and sterile gloves  Local infiltration: lidocaine  Needle  Needle type: combined needle/nerve stimulator   Needle gauge: 22 G  Needle length: 10 cm  Needle localization: ultrasound guidance and anatomical landmarks  Assessment  Injection assessment: negative aspiration for heme, no paresthesia on injection and local visualized surrounding nerve on ultrasound  Slow fractionated injection: yes  Hemodynamics: stable  Additional Notes  Versed 2 mgm IV abnd Fentanyl 100 microgram IV, Left groin prepared, Left femoral artery identified, using doppler. Left groin and medial thigh are sterilized and draped. Ultra sound picture at the junction of the upper third and the lower two third of medial thigh, till we got a picture of the femoral artery and vein, then I passed the stimuplex needle on the proximal side of the femoral artery and injected 40 ccof Ropivicaine  5.6%  No complications.         Reason for block: post-op pain management and at surgeon's request

## 2019-06-11 NOTE — PROGRESS NOTES
Physical Therapy      Room #:   0317/0317-01  Patient Name: Patience Almendarez    PHYSICAL THERAPY INITIAL EVALUATION    Tentative placement recommendation: Home Health Physical Therapy 5 days/week    Equipment recommendation: [employer]  Vs wheels     Plan of care: Patient will be seen  twice daily  for therapeutic exercise, functional retraining, endurance activities, balance exercises, family and patient education. AM-PAC Basic Mobility        AM-[facility] Mobility Inpatient   How much difficulty turning over in bed?: A Little  How much difficulty sitting down on / standing up from a chair with arms?: A Lot  How much difficulty moving from lying on back to sitting on side of bed?: A Lot  How much help from another person moving to and from a bed to a chair?: Total  How much help from another person needed to walk in hospital room?: Total  How much help from another person for climbing 3-5 steps with a railing?: Total  AM-PAC Inpatient Mobility Raw Score : 10  AM-PAC Inpatient T-Scale Score : 32.29  Mobility Inpatient CMS 0-100% Score: 76.75  Mobility Inpatient CMS G-Code Modifier : CL    Order:  EVALUATE AND TREAT    Diagnosis/Problem list:    1.  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    Paroxysmal atrial fibrillation with rapid ventricular response (HCC)    Arthritis of knee, right    Arthritis of knee, left       Past medical history:       Diagnosis Date    Arthritis     History of blood transfusion     History of cardiovascular stress test 6/2015    lexiscan    Hypertension     Sciatica     Right    Spinal headache     Tachycardia     on metoprolol ,paroxysmal, usually at HS    Thyroid disease    ;      Procedure Laterality Date    COLONOSCOPY      COLONOSCOPY N/A 6/27/2018    COLONOSCOPY CONTROL HEMORRHAGE performed by Tonja Mcgraw MD at [address], DIAGNOSTIC      HEEL SPUR SURGERY      JOINT REPLACEMENT Right     KNEE CARTILAGE SURGERY      OTHER SURGICAL HISTORY Right 05/09/2017    right TKA    [place] COLONOSCOPY FLX DX W/COLLJ SPEC WHEN PFRMD N/A 6/26/2018    COLONOSCOPY performed by Sussy Zurita MD at [address] LAP,SURG,COLECT,TOT,W/PROCTECT,W/ILEOST N/A 7/2/2018    LAPAROSCOPIC POSS OPEN SUBTOTAL COLECTOMY POSS ILEOSTOMY performed by Bria Herrera MD at [address] [facility] 1/22/2019    SIGMOIDOSCOPY FLEXIBLE performed by Bria Herrera MD at [address] [facility]         The admitting diagnosis and active problem list as listed above have been reviewed prior to the initiation of this evaluation. Last time out of bed: preop    Precautions: falls , left  FWB (full weight bearing) colostomy (have emptied prior to treatment to avoid tension on seal)   Social history: Patient lives friend (steff)  in a ranch home  with 3 (rail) +2 steps to enter without Rail    Prior Level of Function: Patient ambulated independently    Equipment owned: Cane and Standard Walker,       [address], cooperative, oriented x person and place  and follows directions,  Two step with increased time    ROM: wfl with exception of left knee 0-60 deg  STRENGTH: wfl with exception of Left lower extremity 2-/5  PAIN: (measured on a visual analog scale with 0=no pain and 10=excruciating pain) 0/10.  Adductor block    FUNCTIONAL ASSESSMENT   Bed Mobility- Supine to sit- Moderate assist using leg          Scooting- Moderate assist       Sit to supine- Maximal assist      Transfers-Sit to stand- Moderate assist x 2     Gait:

## 2019-06-11 NOTE — PLAN OF CARE
Problem: Discharge Planning:  Goal: Discharged to appropriate level of care  Description  Discharged to appropriate level of care  Outcome: Met This Shift     Problem: Mobility - Impaired:  Goal: Mobility will improve  Description  Mobility will improve  Outcome: Met This Shift     Problem: Pain - Acute:  Goal: Pain level will decrease  Description  Pain level will decrease  Outcome: Met This Shift     Problem: Falls - Risk of:  Goal: Will remain free from falls  Description  Will remain free from falls  Outcome: Met This Shift     Problem: Pain:  Goal: Pain level will decrease  Description  Pain level will decrease  Outcome: Met This Shift     Problem: Pain:  Goal: Control of acute pain  Description  Control of acute pain  Outcome: Met This Shift

## 2019-06-12 VITALS
RESPIRATION RATE: 17 BRPM | SYSTOLIC BLOOD PRESSURE: 105 MMHG | HEART RATE: 59 BPM | HEIGHT: 63 IN | DIASTOLIC BLOOD PRESSURE: 49 MMHG | OXYGEN SATURATION: 94 % | WEIGHT: 239.56 LBS | TEMPERATURE: 98 F | BODY MASS INDEX: 42.45 KG/M2

## 2019-06-12 LAB
HCT VFR BLD CALC: 41.3 % (ref 34–48)
HEMOGLOBIN: 13.6 G/DL (ref 11.5–15.5)

## 2019-06-12 PROCEDURE — 97116 GAIT TRAINING THERAPY: CPT

## 2019-06-12 PROCEDURE — 97530 THERAPEUTIC ACTIVITIES: CPT

## 2019-06-12 PROCEDURE — 85018 HEMOGLOBIN: CPT

## 2019-06-12 PROCEDURE — 97110 THERAPEUTIC EXERCISES: CPT

## 2019-06-12 PROCEDURE — 97535 SELF CARE MNGMENT TRAINING: CPT

## 2019-06-12 PROCEDURE — 97165 OT EVAL LOW COMPLEX 30 MIN: CPT

## 2019-06-12 PROCEDURE — 6360000002 HC RX W HCPCS: Performed by: ORTHOPAEDIC SURGERY

## 2019-06-12 PROCEDURE — 85014 HEMATOCRIT: CPT

## 2019-06-12 PROCEDURE — 2580000003 HC RX 258: Performed by: ORTHOPAEDIC SURGERY

## 2019-06-12 PROCEDURE — 36415 COLL VENOUS BLD VENIPUNCTURE: CPT

## 2019-06-12 PROCEDURE — 6370000000 HC RX 637 (ALT 250 FOR IP): Performed by: ORTHOPAEDIC SURGERY

## 2019-06-12 RX ORDER — ASPIRIN 325 MG
325 TABLET, DELAYED RELEASE (ENTERIC COATED) ORAL DAILY
Qty: 60 TABLET | Refills: 2 | Status: SHIPPED | OUTPATIENT
Start: 2019-06-12 | End: 2019-07-08

## 2019-06-12 RX ORDER — HYDROCODONE BITARTRATE AND ACETAMINOPHEN 5; 325 MG/1; MG/1
1 TABLET ORAL EVERY 4 HOURS PRN
Qty: 42 TABLET | Refills: 0 | Status: SHIPPED | OUTPATIENT
Start: 2019-06-12 | End: 2019-06-19

## 2019-06-12 RX ADMIN — DOCUSATE SODIUM 100 MG: 100 CAPSULE, LIQUID FILLED ORAL at 10:07

## 2019-06-12 RX ADMIN — HYDROCODONE BITARTRATE AND ACETAMINOPHEN 2 TABLET: 5; 325 TABLET ORAL at 04:22

## 2019-06-12 RX ADMIN — MORPHINE SULFATE 4 MG: 4 INJECTION INTRAVENOUS at 06:31

## 2019-06-12 RX ADMIN — HYDROCODONE BITARTRATE AND ACETAMINOPHEN 2 TABLET: 5; 325 TABLET ORAL at 11:19

## 2019-06-12 RX ADMIN — HYDROCODONE BITARTRATE AND ACETAMINOPHEN 2 TABLET: 5; 325 TABLET ORAL at 16:13

## 2019-06-12 RX ADMIN — SERTRALINE HYDROCHLORIDE 50 MG: 50 TABLET ORAL at 10:07

## 2019-06-12 RX ADMIN — DEXTROSE AND SODIUM CHLORIDE: 5; 450 INJECTION, SOLUTION INTRAVENOUS at 10:06

## 2019-06-12 RX ADMIN — ENOXAPARIN SODIUM 30 MG: 30 INJECTION SUBCUTANEOUS at 10:06

## 2019-06-12 RX ADMIN — MORPHINE SULFATE 4 MG: 4 INJECTION INTRAVENOUS at 13:06

## 2019-06-12 ASSESSMENT — PAIN SCALES - GENERAL
PAINLEVEL_OUTOF10: 6
PAINLEVEL_OUTOF10: 8
PAINLEVEL_OUTOF10: 3
PAINLEVEL_OUTOF10: 9
PAINLEVEL_OUTOF10: 6
PAINLEVEL_OUTOF10: 9
PAINLEVEL_OUTOF10: 4
PAINLEVEL_OUTOF10: 9
PAINLEVEL_OUTOF10: 4
PAINLEVEL_OUTOF10: 9

## 2019-06-12 ASSESSMENT — PAIN DESCRIPTION - PAIN TYPE: TYPE: SURGICAL PAIN

## 2019-06-12 ASSESSMENT — PAIN DESCRIPTION - DESCRIPTORS: DESCRIPTORS: ACHING;DISCOMFORT

## 2019-06-12 ASSESSMENT — PAIN DESCRIPTION - ORIENTATION: ORIENTATION: LEFT

## 2019-06-12 ASSESSMENT — PAIN DESCRIPTION - LOCATION: LOCATION: KNEE

## 2019-06-12 NOTE — PROGRESS NOTES
Department of Orthopedic Surgery  Resident Progress Note    Patient seen and examined. Pain controlled. No new complaints. Pt ambulated with therapy today. She has not done steps yet. Plan for home today. Urinating well. Denies chest pain, shortness of breath, dizziness/lightheadedness. No Flatulence, No BM     VITALS:  BP (!) 105/51   Pulse 54   Temp 97.9 °F (36.6 °C) (Oral)   Resp 16   Ht 5' 3\" (1.6 m)   Wt 239 lb 9 oz (108.7 kg)   SpO2 93%   Breastfeeding?  No   BMI 42.44 kg/m²     General: alert and oriented to person, place and time, well-developed and well-nourished, in no acute distress    MUSCULOSKELETAL:   left lower extremity:  · Dressing C/D/I - ace bandage no saturaion  · Compartments soft and compressible  · +PF/DF/EHL  · +2/4 DP & PT pulses, Brisk Cap refill, Toes warm and perfused  · Distal sensation grossly intact to Peroneals, Sural, Saphenous, and tibial nrs    CBC:   Lab Results   Component Value Date    WBC 8.8 06/04/2019    HGB 13.6 06/12/2019    HCT 41.3 06/12/2019     06/04/2019     PT/INR:    Lab Results   Component Value Date    PROTIME 11.8 06/04/2019    INR 1.1 06/04/2019       ASSESSMENT  · S/P L TKA  - 6/11/19    PLAN      · Continue physical therapy and protocol: WBAT - LLE  · 24 hour abx coverage  · Deep venous thrombosis prophylaxis - Aspirin, early mobilization  · PT/OT  · Pain Control: IV and PO 13.6  · Monitor H&H  · D/C Plan:  Home Health  · Plan for Discharge home today  · Discuss with Dr. Rosenda Chatterjee

## 2019-06-12 NOTE — CARE COORDINATION
SS Note/Discharge plan:  Met with pt for post-op d/c planning and reviewed rehab options and therapy recommendations, pt does plan to return home and prefers [facility], pt has had agency services in the past, notified Era Nicole nurse liaison for agency who will follow for home care orders for anticipated d/c later today, pt has dme needed, pt declined wheels for walker or new w/w, prefers to use her standard walker she has at home, nursing and therapy notified.  Electronically signed by ZAIN Clark on 6/12/2019 at 10:33 AM

## 2019-06-12 NOTE — PROGRESS NOTES
Date:2019  Patient Name: Sawyer Krishnamurthy  MRN: [id number]  : 1945  ROOM #: 0317/0317-01    Occupational Therapy order received, chart reviewed and evaluation attempted this date. Patient is unavailable for OT evaluation due to eating lunch. Will attempt OT evaluation at a later time. Thank you.    Lucy Huffman OTR/[facility]WARD #[id number]

## 2019-06-12 NOTE — PLAN OF CARE
Problem: Discharge Planning:  Goal: Discharged to appropriate level of care  Description  Discharged to appropriate level of care  Outcome: Met This Shift     Problem: Mobility - Impaired:  Goal: Mobility will improve  Description  Mobility will improve  6/12/2019 1443 by Kim Allan RN  Outcome: Met This Shift     Problem: Infection - Surgical Site:  Goal: Will show no infection signs and symptoms  Description  Will show no infection signs and symptoms  Outcome: Met This Shift     Problem: Pain - Acute:  Goal: Pain level will decrease  Description  Pain level will decrease  Outcome: Met This Shift     Problem: Falls - Risk of:  Goal: Will remain free from falls  Description  Will remain free from falls  6/12/2019 1443 by Kim Allan RN  Outcome: Met This Shift     Problem: Falls - Risk of:  Goal: Absence of physical injury  Description  Absence of physical injury  Outcome: Met This Shift     Problem: Pain:  Goal: Pain level will decrease  Description  Pain level will decrease  Outcome: Met This Shift

## 2019-06-12 NOTE — PROGRESS NOTES
assists x 1    Gait:  Patient ambulated 50 feet x 2 using Loly Rouleau with Supervision. Verbal cues required for sequencing, safety, upright posture, weight bearing,  increased base of support, increased step length,     Steps: Pt ascended/descended 14 steps using 2 hand rails with Supervision. V/C for sequencing and safety. Treatment: Pt performed supine ex's:ankle pumps, gluteal sets, quad sets, heel slides, straight leg raise, hip abduction, hip adduction,  x 15 reps. V/C for technique. Pt transferred to edge of bed, stood, ambulated in the hallway, performed stair training, and back to the chair. Pt instructed on using a leg  for bed mobility, to assist with exercises flexion and extension, proper hand placement and increased knee flexion with each sit to stand transfer and to elevate her L leg higher than her heart while sitting more than 1 hour or more and in bed to decrease swelling and to use leg  for dorsiflexion to increase her knee extension when elevated. Family education/handouts: No family present during tx session. Comment: Call light left by patient. Pt in chair at end of tx session and notified nursing. A: Pt able to perform bed mobility with Supervision using leg . Pt able to progress with increased distance and perform stairs with no LOB. P: Continue with physical therapy    GERONIMO BEDOLLA, PTA   GOALS to be met in 1 days. Bed mobility-  Supervision                                          Transfers-Sit to stand-Supervision                 Gait:  Patient to ambulate 75 feet using wheeled walker with Supervision                 Steps: Patient to go up and down 3  step(s) using rail and crutch and 2 steps with 2 crutches  with  Minimal assist        Increase rom in affected joints by 10%  Increase strength in affected mm groups by 1/3 grade  Increase balance to allow for improvement towards functional goals.   Increase endurance to allow for improvement towards functional goals.

## 2019-06-12 NOTE — PROGRESS NOTES
CLINICAL PHARMACY NOTE: MED[address] TO [address] Select Patient?: No  Total # of Prescriptions Filled: 1   The following medications were delivered to the patient:  · Hydrocodone/apap 5-325  Total # of Interventions Completed: 3  Time Spent (min): 15    Additional Documentation:

## 2019-06-12 NOTE — PROGRESS NOTES
Patient dangled on edge of bed, stood at bedside with 1 assist and front wheeled walker. Ambulated 5 feet to commode and sat for 25 minutes. Ambulated back to bed with 1 assist and front wheeled walker. Returned to bed. Tolerated well.

## 2019-06-12 NOTE — PLAN OF CARE
Problem: Mobility - Impaired:  Goal: Mobility will improve  Description  Mobility will improve  6/12/2019 0116 by Mortimer Dolphin, RN  Outcome: Met This Shift     Problem: Falls - Risk of:  Goal: Will remain free from falls  Description  Will remain free from falls  6/12/2019 0116 by Mortimer Dolphin, RN  Outcome: Met This Shift

## 2019-06-12 NOTE — PROGRESS NOTES
Physical Therapy  PHYSICAL THERAPY  TREATMENT NOTE    6/12/2019  0317/0317-01                      Patient Name: Samuel Tapia      [id number]                              1945     Recommendation for discharge: HHPT 5x/week  Equipment prescriptions needed: to be determined    AM-[facility] Mobility Inpatient   How much difficulty turning over in bed?: A Little  How much difficulty sitting down on / standing up from a chair with arms?: A Little  How much difficulty moving from lying on back to sitting on side of bed?: None  How much help from another person moving to and from a bed to a chair?: A Little  How much help from another person needed to walk in hospital room?: A Little  How much help from another person for climbing 3-5 steps with a railing?: A Lot  AM-PAC Inpatient Mobility Raw Score : 18  AM-PAC Inpatient T-Scale Score : 43.63  Mobility Inpatient CMS 0-100% Score: 46.58  Mobility Inpatient CMS G-Code Modifier : CK      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 Patient is agreeable to treatment. Pt c/o pain with L knee and dizziness once standing up. Pain status: (measured on a visual analog scale with 0=no pain and 10=excruciating pain) 9/10.    O: Pt was instructed in and performed the following:   Bed Mobility-  Supine to sit-Supervision  Using leg      Scooting- Supervision      Sit to supine-Supervision Transfers-sit to stand- Minimal assists x 1    Gait:  Patient ambulated 3 feet; 10 feet using Wheeled Walker with Minimal assists x 1. Verbal cues required for sequencing, safety, upright posture, weight bearing,  increased base of support, increased step length,     Steps: Not assessed    Treatment: Pt in chair at start of tx session, returned pt to bed, performed supine ex's:ankle pumps, gluteal sets, quad sets, heel slides, straight leg raise, hip abduction, hip adduction,  x 10 reps. V/C for technique. Pt transferred to edge of bed, stood, ambulated around the bed, and back to bed. Pt instructed on using a leg  for bed mobility, to assist with exercises flexion and extension, proper hand placement and increased knee flexion with each sit to stand transfer and to elevate her L leg higher than her heart while sitting more than 1 hour or more and in bed to decrease swelling and to use leg  for dorsiflexion to increase her knee extension when elevated. Family education/handouts: No family present during tx session. Comment: Call light left by patient. RTB at end of tx session. A: Pt able to perform bed mobility with Supervision using leg . Pt ambulated around her bed and c/o dizziness and requested to lay back down. Nursing notified and did not attempt stair training this am.  P: Continue with physical therapy    GERONIMO BEDOLLA, PTA   GOALS to be met in 1 days. Bed mobility-  Supervision                                          Transfers-Sit to stand-Supervision                 Gait:  Patient to ambulate 75 feet using wheeled walker with Supervision                 Steps: Patient to go up and down 3  step(s) using rail and crutch and 2 steps with 2 crutches  with  Minimal assist        Increase rom in affected joints by 10%  Increase strength in affected mm groups by 1/3 grade  Increase balance to allow for improvement towards functional goals.   Increase endurance to allow for improvement towards functional goals.

## 2019-06-12 NOTE — CONSULTS
CONSULT    H&P    Patient ID:  Librado Humphries  [id number]  69 y.o.  1945     Reason for consult: Paroxysmal atrial fibrillation, hypertension, osteoarthritis, hypothyroidism for medical management  Requesting Physician: Dr. Clarice Ortez    History Obtained From: Patient seen post left total knee replacement, long-standing history of hypertension, hypothyroidism, osteoarthritis, previous history of diverticulitis and diverticular bleed, patient has divergent colostomy, patient underwent left total knee replacement, pain well-controlled, already has been out of bed to chair couple times. HISTORY OF PRESENT ILLNESS:    The patient is a 69 y.o. female who presents with left knee pain, patient is post left total knee replacement, known history of paroxysmal atrial fibrillation, hypertension, hypothyroidism, diverticular bleed, patient has ileostomy.   Past Medical History:        Diagnosis Date    Arthritis     History of blood transfusion     History of cardiovascular stress test 6/2015    lexiscan    Hypertension     Sciatica     Right    Spinal headache     Tachycardia     on metoprolol ,paroxysmal, usually at HS    Thyroid disease        Past Surgical History:        Procedure Laterality Date    COLONOSCOPY      COLONOSCOPY N/A 6/27/2018    COLONOSCOPY CONTROL HEMORRHAGE performed by Debi Fitzgerald MD at [address] [facility], COLON, DIAGNOSTIC      HEEL SPUR SURGERY      JOINT REPLACEMENT Right     KNEE CARTILAGE SURGERY      OTHER SURGICAL HISTORY Right 05/09/2017    right TKA    MD COLONOSCOPY FLX DX W/COLLJ SPEC WHEN PFRMD N/A 6/26/2018    [address]ONOSCOPY performed by Sabiha Gonzalez MD [address] LAP,SURG,COLECT,TOT,W/PROCTECT,W/ILEOST N/A 7/2/2018    LAPAROSCOPIC POSS OPEN SUBTOTAL COLECTOMY POSS ILEOSTOMY performed by Jenny Berman MD at [address]  1/22/2019    SIGMOIDOSCOPY FLEXIBLE performed by Jenny Berman MD at [address] LIGATION         Medications Prior to Admission:    Medications Prior to Admission: sertraline (ZOLOFT) 50 MG tablet, TAKE ONE TABLET BY MOUTH EVERY DAY  sotalol (BETAPACE) 80 MG tablet, Take 1 tablet by mouth 2 times daily  losartan-hydrochlorothiazide (HYZAAR) 50-12.5 MG per tablet, Take 1 tablet by mouth daily  levothyroxine (SYNTHROID) 50 MCG tablet, Take 50 mcg by mouth nightly   Fragrances, Bulk, (CUCUMBER MELON) LIQD, by Does not apply route  Misc Natural Products (GLUCOSAMINE CHONDROITIN ADV PO), Take 1 tablet by mouth daily  vitamin D-3 (CHOLECALCIFEROL) 5000 UNITS TABS, Take 5,000 Units by mouth daily  celecoxib (CELEBREX) 200 MG capsule, Take 200 mg by mouth nightly     Allergies:  Bactrim [sulfamethoxazole-trimethoprim]; Sulfa antibiotics; Talwin [pentazocine]; and Tetracyclines & related    Social History:   TOBACCO:   reports that she has never smoked. She has never used smokeless tobacco.  ETOH:   reports that she drinks alcohol. Patient currently lives alone    Family History:       Problem Relation Age of Onset   [facility] Cancer Mother     Cancer Father        REVIEW OF SYSTEMS:  CONSTITUTIONAL:  Neg   Recent weight changes,fatigue,fever,chills or night sweats  EYES:  Neg  blurriness,tearing,itching or acute change in vision  EARS:  Neg   hearing loss,tinnitus,vertigo,discharge or earache. NOSE:  Neg  rhinorrhea,sneezing,itching,allergy or epistaxis  MOUTH/THROAT:  Neg  bleeding gums,hoarseness or sore throat. RESPIRATORY:   Neg SOB,wheeze,cough,sputum,hemoptysis or bronochitis  CARDIOVASCULAR   Neg : chest pain,palpitations,dyspnea on exertion,orthopnea,paroxysmal nocturnal dyspnea or edema  GASTROINTESTINAL:  Neg   Appetite changes,nausea,vomiting,or diarrhea,indigestion,dysphagia,change in bowel movements, or abdominal pain.,  Has ileostomy  GENITOURINARY:  Neg  Urinary frequency,hesitancy,urgency,polyuria,dysuria,hematuria,nocturia,or incontinence.   HEMATOLOGIC/LYMPHATIC:  Neg  Anemia,bleeding 06/12/2019     06/04/2019    MCV 88.4 06/04/2019    SEGSPCT 72 01/18/2014    LYMPHOPCT 17.2 05/06/2019     BMP:    Lab Results   Component Value Date     06/04/2019    K 4.3 06/04/2019     06/04/2019    CO2 25 06/04/2019    BUN 19 06/04/2019    CREATININE 0.9 06/04/2019    CALCIUM 9.7 06/04/2019    GFRAA >60 06/04/2019    LABGLOM >60 06/04/2019    GLUCOSE 132 06/04/2019    GLUCOSE 87 03/22/2012     PT/INR:  No results found for: PTINR      ASSESSMENT:      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 History of diverticular bleed patient has ileostomy. Paroxysmal atrial fibrillation continue present sotalol. Hypothyroidism continue present thyroid replacement. History of hypertension blood pressure well controlled. See my Orders.       Completed By:  Dr. Jama Covarrubias MD, Evelene Snellen.  8:13 AM  6/12/2019    Electronically signed by Elizabeth Stearns MD on 6/12/19 at 8:13 AM

## 2019-06-12 NOTE — PROGRESS NOTES
Occupational Therapy  Occupational Therapy Initial Assessment    Date:2019  Patient Name: Librado Humphires  MRN: [id number]  : 1945  ROOM #: 0317/0317-01    Placement Recommendation: HHOT   Equipment Prescriptions Needed: dressing kit provided, wheels for standard walker    [facility]   AM-PAC Daily Activity Inpatient   How much help for putting on and taking off regular lower body clothing?: A Little  How much help for Bathing?: A Little  How much help for Toileting?: A Little  How much help for putting on and taking off regular upper body clothing?: A Little  How much help for taking care of personal grooming?: A Little  How much help for eating meals?: None  AM-PAC Inpatient Daily Activity Raw Score: 19  AM-PAC Inpatient ADL T-Scale Score : 40.22  ADL Inpatient CMS 0-100% Score: 42.8  ADL Inpatient CMS G-Code Modifier : CK    Diagnosis:   1. Primary osteoarthritis of right knee    2. S/P total knee arthroplasty, left      Past Medical History:   Past Medical History:   Diagnosis Date    Arthritis     History of blood transfusion     History of cardiovascular stress test 2015    lexiscan    Hypertension     Sciatica     Right    Spinal headache     Tachycardia     on metoprolol ,paroxysmal, usually at     Thyroid disease          The admitting diagnosis and active problem list as listed above have been reviewed prior to the initiation of this evaluation. Nursing cleared the patient for evaluation. Patient is agreeable to evaluation. Precautions: falls, full weight bearing: L LE d/t TKA, diaphoretic and dizzy    Pain Scale: Numeric Rate: 4/10 in L knee; Nursing notified. Social history: with a friend named Debra       Drive: yes  Home architecture: single family home, 1 story, 3+2 steps to enter with no rail, walk in shower. PLOF: independent with BADL and IADL, ambulated with no device   Equipment owned: standard walker, cane   Cognition: oriented x 4; follows 3 step directions.     good Problem solving skills   good  Memory    good  Sequencing  Communication: intact   Visual perceptual skills: intact     Glasses: no   Edema: yes, L LE    Sensation: intact   Hand Dominance:  Left     X  Right     Left Right Comment   Passive range of motion [facility]     Active range of motion [facility]     Muscle Grade 4/5 4/5    /pinch Strength Intact  Intact     [] Malnutrition indicators have been identified and nursing has been notified to ensure a dietitian consult is ordered. Function Assessment    Status  Goal Comment   Feeding:  Independent   Independent      Grooming:  Supervision  Independent      UE dressing:  Supervision  Independent     LE dressing:  Minimal Assist  Modified [place]  To don socks using sock aide    Bathing:  Minimal Assist  Independent     Bed Mobility:     Supervision  for supine to sit using leg ,   Supervision  for scooting,  Stand by Assist  for sit to supine using leg  Independent     Functional Transfers:    Supervision  for sit to stand transfer from EOB, wheelchair x 3 reps, bedside commode, car in clinic Independent  Safety: good    Functional Mobility: 20 feet x 2 plus ambulated in clinic bathroom and to and from car with wheeled walker and Minimal assist progressing to  Supervision  Modified [place]       Pt/family participated in establishment of goals. Balance:   Sitting: good   Standing: fair with wheeled walker    Endurance: fair       Assessment of Current Deficits:   [x]Functional mobility  []ROM  [x]Strength   []Cognition   []Safety Awareness    [x]ADLs [x]IADLs   [x]Endurance  [x]Balance    []Vision  []Sensation     []Gross Motor Coordination       []Fine Motor Coordination     · Low Complexity  · History: Brief history including review of medical records relating to the problem  · Exam: 1-3 performance Deficits  · Assistance/Modification: No assistance or modifications required to perform tasks.  No comorbities affecting occupational performance. Patients Goal: return home today   Treatment: OT vicky, pt educated in leg  prior to use, bed mobility using leg , sitting balance at EOB for 10 minutes with supervision, educated pt on lower body dressing techniques, pt donned sock using sock aide, transfer training with verbal cues for hand placement, static standing balance with wheeled walker, functional mobility with wheeled walker to wheelchair in hallway, verbal cues for walker sequence and safety, pt ambulated around clinic participating in various transfers such as a commode transfer and transferring to and from the car, pt assisted back to wheelchair and returned to room. Pt returned self to bed using leg . All needs within reach. Rehab Potential: good   Plan of care: Patient will be seen by OT 2-4 PRN times a week for therapeutic activity, ADL re-training, bed mobility, functional transfers, functional mobility, safety and fall prevention, balance and endurance activities, instruction in energy conservation principles, and patient/family education. Patient and/or family understands diagnosis, prognosis, education and plan of care. Pt/family verbalized understanding.      Time Code treatment minutes: [address] OTR/L #[id number]

## 2019-07-08 ENCOUNTER — OFFICE VISIT (OUTPATIENT)
Dept: ORTHOPEDIC SURGERY | Age: 74
End: 2019-07-08

## 2019-07-08 VITALS — HEIGHT: 63 IN | BODY MASS INDEX: 42.52 KG/M2 | WEIGHT: 240 LBS

## 2019-07-08 DIAGNOSIS — Z96.652 S/P TKR (TOTAL KNEE REPLACEMENT) USING CEMENT, LEFT: Primary | ICD-10-CM

## 2019-07-08 PROCEDURE — 99024 POSTOP FOLLOW-UP VISIT: CPT | Performed by: ORTHOPAEDIC SURGERY

## 2019-07-08 NOTE — PROGRESS NOTES
Chief Complaint:   Chief Complaint   Patient presents with    Post-Op Check     post-op from left TKR DOS 6/11/19, patient states she twisted her knee twice since her surgery, she has swelling and some pain. Debra Almendarez is 4 weeks postop left total knee replacement arthroplasty. She says this was doing better and its been easier than the other knee. She seems to be happy with the results and thinks that she is healing up pretty quickly. Allergies; medications; past medical, surgical, family, and social history; and problem list have been reviewed today and updated as indicated in this encounter seen below. Exam: The scar looks good healing well. There is minimal edema or effusion in the at this point. Her calf is supple. Her knee is stable with a little bit tender with stress examination. Range of motion is 0 to 100 degrees or more flexion. Gait is brisk with slight favoring of the left lower extremity and she needs to work on full extension of the knee during her gait cycle. ASSESSMENT:    Aron Garcias was seen today for post-op check. Diagnoses and all orders for this visit:    S/P TKR (total knee replacement) using cement, left        PLAN: Finish outpatient physical therapy and continue home exercise. Increase activity gradually. Return in about 6 weeks (around 8/19/2019).        Current Outpatient Medications   Medication Sig Dispense Refill    Fragrances, Bulk, (CUCUMBER MELON) LIQD by Does not apply route      sertraline (ZOLOFT) 50 MG tablet TAKE ONE TABLET BY MOUTH EVERY DAY  4    sotalol (BETAPACE) 80 MG tablet Take 1 tablet by mouth 2 times daily 60 tablet 3    Misc Natural Products (GLUCOSAMINE CHONDROITIN ADV PO) Take 1 tablet by mouth daily      losartan-hydrochlorothiazide (HYZAAR) 50-12.5 MG per tablet Take 1 tablet by mouth daily      vitamin D-3 (CHOLECALCIFEROL) 5000 UNITS TABS Take 5,000 Units by mouth daily      celecoxib (CELEBREX) 200 MG capsule Take 200 mg by mouth nightly       levothyroxine (SYNTHROID) 50 MCG tablet Take 50 mcg by mouth nightly        No current facility-administered medications for this visit.         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    Paroxysmal atrial fibrillation (HCC)    Arthritis of knee, right    Arthritis of knee, left       Past Medical History:   Diagnosis Date    Arthritis     History of blood transfusion     History of cardiovascular stress test 6/2015    lexiscan    Hypertension     Sciatica     Right    Spinal headache     Tachycardia     on metoprolol ,paroxysmal, usually at HS    Thyroid disease        Past Surgical History:   Procedure Laterality Date    COLONOSCOPY      COLONOSCOPY N/A 6/27/2018    COLONOSCOPY CONTROL HEMORRHAGE performed by Mellisa Jorge MD at [address] [facility], COLON, DIAGNOSTIC      HEEL SPUR SURGERY      JOINT REPLACEMENT Right     KNEE CARTILAGE SURGERY      OTHER SURGICAL HISTORY Right 05/09/2017    right TKA    DC COLONOSCOPY FLX DX W/COLLJ SPEC WHEN PFRMD N/A 6/26/2018    COLONOSCOPY performed by Laura Rice MD at [address] LAP,SURG,COLECT,TOT,W/PROCTECT,W/ILEOST N/A 7/2/2018    LAPAROSCOPIC POSS OPEN SUBTOTAL COLECTOMY POSS ILEOSTOMY performed by Betzy Costa MD at [address] [facility] N/A 1/22/2019    SIGMOIDOSCOPY FLEXIBLE performed by Betzy Costa MD at [address] Left 6/11/2019    LEFT TOTAL KNEE ARTHROPLASTY (LUIS ENRIQUE) performed by Ashutosh Marti DO at [address] [facility]

## 2019-07-15 ENCOUNTER — EVALUATION (OUTPATIENT)
Dept: PHYSICAL THERAPY | Age: 74
End: 2019-07-15
Payer: COMMERCIAL

## 2019-07-15 DIAGNOSIS — Z96.652 S/P TKR (TOTAL KNEE REPLACEMENT), LEFT: Primary | ICD-10-CM

## 2019-07-15 PROCEDURE — 97163 PT EVAL HIGH COMPLEX 45 MIN: CPT | Performed by: PHYSICAL THERAPIST

## 2019-07-15 PROCEDURE — 97110 THERAPEUTIC EXERCISES: CPT | Performed by: PHYSICAL THERAPIST

## 2019-07-15 NOTE — PROGRESS NOTES
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     Past Medical History:  Past Medical History:   Diagnosis Date    Arthritis     History of blood transfusion     History of cardiovascular stress test 2015    lexiscan    Hypertension     Sciatica     Right    Spinal headache     Tachycardia     on metoprolol ,paroxysmal, usually at HS    Thyroid disease      Past Surgical History:   Procedure Laterality Date    COLONOSCOPY      COLONOSCOPY N/A 2018    COLONOSCOPY CONTROL HEMORRHAGE performed by Ryan Zee MD at [address] [facility] [place], DIAGNOSTIC      HEEL SPUR SURGERY      JOINT REPLACEMENT Right     KNEE CARTILAGE SURGERY      OTHER SURGICAL HISTORY Right 2017    right TKA    [place] COLONOSCOPY FLX DX W/COLLJ SPEC WHEN PFRMD N/A 2018    COLONOSCOPY performed by Nuria Cullen MD at [address] LAP,SURG,COLECT,TOT,W/PROCTECT,W/ILEOST N/A 2018    LAPAROSCOPIC POSS OPEN SUBTOTAL COLECTOMY POSS ILEOSTOMY performed by Chester Garcia MD at [address] [facility] 2019    SIGMOIDOSCOPY FLEXIBLE performed by Chester Garcia MD at [address] Left 2019    LEFT TOTAL KNEE ARTHROPLASTY (LUIS ENRIQUE) performed by ALBERTO There are no barriers affecting plan of care or recovery    [] Barriers to this patient's plan of care or recovery include. Domestic Concerns:  [x] No  [] Yes:    Short Term goals (2 weeks)   Decrease reported pain to 1/10   Increase ROM to WNL   Lower Extremity Functional Scale (LEFS) 20% impairment    Long Term goals (4 weeks)   Decrease reported pain to 0/10   Increase ROM to WNL and symmetrical   Increase Strength to 5/5    Able to perform/complete the following functions/tasks: reciprocal stairs   Independent with Home Exercise Programs   Lower Extremity Functional Scale (LEFS) 10% impairment    Rehab Potential: [x] Good  [] Fair  [] Poor    PLAN       Treatment Plan:  [x] Therapeutic Exercise  [x] Therapeutic Activity  [x] Neuromuscular Re-education   [x] Gait Training  [x] Balance Training  [] Aerobic conditioning  [] Manual Therapy  [] Massage/Fascial release   [] Work/Sport specific activities    [] Pain Neuroscience [x] Cold/hotpack  [] Vasocompression  [] Electrical Stimulation  [] Lumbar/Cervical Traction  [] Ultrasound   [] Iontophoresis: 4 mg/mL Dexamethasone Sodium Phosphate 40-80 mAmin        [x] Instruction in HEP      []  Medication allergies reviewed for use of Dexamethasone Sodium Phosphate 4mg/ml  with iontophoresis treatments. Patient is not allergic. The following CPT codes are likely to be used in the care of this patient: 94781 Therapeutic Exercise , 91441 Neuromuscular Re-Education ,  Therapeutic Activities       Suggested Professional Referral: [x] No  [] Yes:     Patient Education:  [x] Plans/Goals, Risks/Benefits discussed  [x] Home exercise program  Method of Education: [x] Verbal  [x] Demo  [x] Written  Comprehension of Education:  [x] Verbalizes understanding. [x] Demonstrates understanding. [] Needs Review. [] Demonstrates/verbalizes understanding of HEP/Ed previously given.     Frequency:  1-2 days per week for 4 weeks    Patient understands diagnosis/prognosis and consents to treatment, plan and goals: [x] Yes    [] No     Thank you for the opportunity to work with your patient. If you have questions or comments, please contact me at [phone]; fax: [phone]. Electronically signed by: Joanne Summers PT         Please sign Physician's Certification and return to: [facility]  [address] [facility] [address]  Dept: [phone]  Dept Fax: [phone] Certification / Comments     Frequency/Duration 1-2 days per week for 4 weeks. Certification period from 7/15/2019  to 10-5-19. I have reviewed the Plan of Care established for skilled therapy services and certify that the services are required and that they will be provided while the patient is under my care.     Physician's Comments/Revisions:               Physician's Printed Name:                                           [de-identified] Signature:                                                               Date:

## 2019-07-22 ENCOUNTER — TREATMENT (OUTPATIENT)
Dept: PHYSICAL THERAPY | Age: 74
End: 2019-07-22
Payer: COMMERCIAL

## 2019-07-22 DIAGNOSIS — Z96.652 S/P TKR (TOTAL KNEE REPLACEMENT), LEFT: Primary | ICD-10-CM

## 2019-07-22 PROCEDURE — 97110 THERAPEUTIC EXERCISES: CPT | Performed by: PHYSICAL THERAPIST

## 2019-07-23 ENCOUNTER — HOSPITAL ENCOUNTER (OUTPATIENT)
Age: 74
Discharge: HOME | End: 2019-07-25
Payer: COMMERCIAL

## 2019-07-23 LAB
CHOLESTEROL, TOTAL: 154 MG/DL (ref 0–199)
HBA1C MFR BLD: 5.8 % (ref 4–5.6)
HDLC SERPL-MCNC: 43 MG/DL
LDL CHOLESTEROL CALCULATED: 73 MG/DL (ref 0–99)
T4 FREE: 1.27 NG/DL (ref 0.93–1.7)
TRIGL SERPL-MCNC: 189 MG/DL (ref 0–149)
TSH SERPL DL<=0.05 MIU/L-ACNC: 4.57 UIU/ML (ref 0.27–4.2)
VLDLC SERPL CALC-MCNC: 38 MG/DL

## 2019-07-23 PROCEDURE — 83036 HEMOGLOBIN GLYCOSYLATED A1C: CPT

## 2019-07-23 PROCEDURE — 84439 ASSAY OF FREE THYROXINE: CPT

## 2019-07-23 PROCEDURE — 84443 ASSAY THYROID STIM HORMONE: CPT

## 2019-07-23 PROCEDURE — 80061 LIPID PANEL: CPT

## 2019-09-11 ENCOUNTER — HOSPITAL ENCOUNTER (OUTPATIENT)
Age: 74
Discharge: HOME | End: 2019-09-13
Payer: COMMERCIAL

## 2019-09-11 LAB
ALBUMIN SERPL-MCNC: 3.7 G/DL (ref 3.5–5.2)
ALP BLD-CCNC: 86 U/L (ref 35–104)
ALT SERPL-CCNC: 16 U/L (ref 0–32)
ANION GAP SERPL CALCULATED.3IONS-SCNC: 15 MMOL/L (ref 7–16)
AST SERPL-CCNC: 23 U/L (ref 0–31)
BASOPHILS ABSOLUTE: 0.07 E9/L (ref 0–0.2)
BASOPHILS RELATIVE PERCENT: 0.9 % (ref 0–2)
BILIRUB SERPL-MCNC: 0.4 MG/DL (ref 0–1.2)
BUN BLDV-MCNC: 19 MG/DL (ref 8–23)
CALCIUM SERPL-MCNC: 9.8 MG/DL (ref 8.6–10.2)
CHLORIDE BLD-SCNC: 103 MMOL/L (ref 98–107)
CO2: 23 MMOL/L (ref 22–29)
CREAT SERPL-MCNC: 1 MG/DL (ref 0.5–1)
EOSINOPHILS ABSOLUTE: 0.28 E9/L (ref 0.05–0.5)
EOSINOPHILS RELATIVE PERCENT: 3.5 % (ref 0–6)
GFR AFRICAN AMERICAN: >60
GFR NON-AFRICAN AMERICAN: 54 ML/MIN/1.73
GLUCOSE BLD-MCNC: 132 MG/DL (ref 74–99)
HCT VFR BLD CALC: 44.5 % (ref 34–48)
HEMOGLOBIN: 14.2 G/DL (ref 11.5–15.5)
IMMATURE GRANULOCYTES #: 0.03 E9/L
IMMATURE GRANULOCYTES %: 0.4 % (ref 0–5)
LYMPHOCYTES ABSOLUTE: 1.13 E9/L (ref 1.5–4)
LYMPHOCYTES RELATIVE PERCENT: 14.3 % (ref 20–42)
MCH RBC QN AUTO: 29 PG (ref 26–35)
MCHC RBC AUTO-ENTMCNC: 31.9 % (ref 32–34.5)
MCV RBC AUTO: 90.8 FL (ref 80–99.9)
MONOCYTES ABSOLUTE: 0.83 E9/L (ref 0.1–0.95)
MONOCYTES RELATIVE PERCENT: 10.5 % (ref 2–12)
NEUTROPHILS ABSOLUTE: 5.57 E9/L (ref 1.8–7.3)
NEUTROPHILS RELATIVE PERCENT: 70.4 % (ref 43–80)
PDW BLD-RTO: 13.2 FL (ref 11.5–15)
PLATELET # BLD: 267 E9/L (ref 130–450)
PMV BLD AUTO: 9.9 FL (ref 7–12)
POTASSIUM SERPL-SCNC: 4.4 MMOL/L (ref 3.5–5)
RBC # BLD: 4.9 E12/L (ref 3.5–5.5)
SODIUM BLD-SCNC: 141 MMOL/L (ref 132–146)
T4 FREE: 1.33 NG/DL (ref 0.93–1.7)
TOTAL PROTEIN: 7.5 G/DL (ref 6.4–8.3)
TSH SERPL DL<=0.05 MIU/L-ACNC: 3.08 UIU/ML (ref 0.27–4.2)
VITAMIN D 25-HYDROXY: 50 NG/ML (ref 30–100)
WBC # BLD: 7.9 E9/L (ref 4.5–11.5)

## 2019-09-11 PROCEDURE — 85025 COMPLETE CBC W/AUTO DIFF WBC: CPT

## 2019-09-11 PROCEDURE — 84443 ASSAY THYROID STIM HORMONE: CPT

## 2019-09-11 PROCEDURE — 80053 COMPREHEN METABOLIC PANEL: CPT

## 2019-09-11 PROCEDURE — 82306 VITAMIN D 25 HYDROXY: CPT

## 2019-09-11 PROCEDURE — 84439 ASSAY OF FREE THYROXINE: CPT

## 2019-09-16 ENCOUNTER — OFFICE VISIT (OUTPATIENT)
Dept: ORTHOPEDIC SURGERY | Age: 74
End: 2019-09-16
Payer: COMMERCIAL

## 2019-09-16 VITALS — BODY MASS INDEX: 43.41 KG/M2 | WEIGHT: 245 LBS | HEIGHT: 63 IN

## 2019-09-16 DIAGNOSIS — Z96.652 S/P TKR (TOTAL KNEE REPLACEMENT) USING CEMENT, LEFT: Primary | ICD-10-CM

## 2019-09-16 PROCEDURE — 99213 OFFICE O/P EST LOW 20 MIN: CPT | Performed by: ORTHOPAEDIC SURGERY

## 2019-09-16 NOTE — PROGRESS NOTES
Chief Complaint:   Chief Complaint   Patient presents with    Post-Op Check     post-op from left TKR DOS 6/11/19, patient still has some swelling. Debra Almendarez little more than 3 months postop left total knee replacement arthroplasty. She is doing very well. He does notice a little bit of noise coming from the knee but does not cause her any big distress. She is happy with the results. Range of motion is good and she is quite active. Allergies; medications; past medical, surgical, family, and social history; and problem list have been reviewed today and updated as indicated in this encounter seen below. Exam: Incisions well-healed the anterior midline left knee. She has no effusion today her calf is supple. She has good neurovascular function overall. She is good medial and lateral stability and good posterior stability with a little bit of noise from the prosthesis which is normal.  This was explained to her and she seemed relieved to that. The sensation is improved in the lateral parapatellar area as well. Gait is smooth and balanced. Radiographs: None    ASSESSMENT:    Shanthi Galeana was seen today for post-op check. Diagnoses and all orders for this visit:    S/P TKR (total knee replacement) using cement, left        PLAN: X-ray left knee 3 months. Activity as tolerated. Return in about 3 months (around 12/16/2019) for X knee.        Current Outpatient Medications   Medication Sig Dispense Refill    Fragrances, Bulk, (CUCUMBER MELON) LIQD by Does not apply route      sotalol (BETAPACE) 80 MG tablet Take 1 tablet by mouth 2 times daily 60 tablet 3    Misc Natural Products (GLUCOSAMINE CHONDROITIN ADV PO) Take 1 tablet by mouth daily      losartan-hydrochlorothiazide (HYZAAR) 50-12.5 MG per tablet Take 1 tablet by mouth daily      vitamin D-3 (CHOLECALCIFEROL) 5000 UNITS TABS Take 5,000 Units by mouth daily      celecoxib (CELEBREX) 200 MG capsule Take 200 mg by mouth nightly

## 2019-11-11 ENCOUNTER — OFFICE VISIT (OUTPATIENT)
Dept: SURGERY | Age: 74
End: 2019-11-11
Payer: COMMERCIAL

## 2019-11-11 VITALS
BODY MASS INDEX: 42.7 KG/M2 | HEIGHT: 63 IN | OXYGEN SATURATION: 95 % | TEMPERATURE: 97.3 F | DIASTOLIC BLOOD PRESSURE: 76 MMHG | HEART RATE: 67 BPM | SYSTOLIC BLOOD PRESSURE: 143 MMHG | WEIGHT: 241 LBS

## 2019-11-11 DIAGNOSIS — K43.5 PARASTOMAL HERNIA WITHOUT OBSTRUCTION OR GANGRENE: Primary | ICD-10-CM

## 2019-11-11 DIAGNOSIS — Z93.2 ILEOSTOMY IN PLACE (HCC): ICD-10-CM

## 2019-11-11 PROCEDURE — 99213 OFFICE O/P EST LOW 20 MIN: CPT | Performed by: SURGERY

## 2019-12-04 ENCOUNTER — HOSPITAL ENCOUNTER (OUTPATIENT)
Age: 74
Discharge: HOME | End: 2019-12-06
Payer: COMMERCIAL

## 2019-12-04 LAB
BASOPHILS ABSOLUTE: 0.06 E9/L (ref 0–0.2)
BASOPHILS RELATIVE PERCENT: 0.7 % (ref 0–2)
EOSINOPHILS ABSOLUTE: 0.27 E9/L (ref 0.05–0.5)
EOSINOPHILS RELATIVE PERCENT: 3.1 % (ref 0–6)
HBA1C MFR BLD: 6.3 % (ref 4–5.6)
HCT VFR BLD CALC: 45 % (ref 34–48)
HEMOGLOBIN: 14.2 G/DL (ref 11.5–15.5)
IMMATURE GRANULOCYTES #: 0.03 E9/L
IMMATURE GRANULOCYTES %: 0.3 % (ref 0–5)
LYMPHOCYTES ABSOLUTE: 1.38 E9/L (ref 1.5–4)
LYMPHOCYTES RELATIVE PERCENT: 15.9 % (ref 20–42)
MCH RBC QN AUTO: 29 PG (ref 26–35)
MCHC RBC AUTO-ENTMCNC: 31.6 % (ref 32–34.5)
MCV RBC AUTO: 92 FL (ref 80–99.9)
MONOCYTES ABSOLUTE: 0.85 E9/L (ref 0.1–0.95)
MONOCYTES RELATIVE PERCENT: 9.8 % (ref 2–12)
NEUTROPHILS ABSOLUTE: 6.1 E9/L (ref 1.8–7.3)
NEUTROPHILS RELATIVE PERCENT: 70.2 % (ref 43–80)
PDW BLD-RTO: 13.5 FL (ref 11.5–15)
PLATELET # BLD: 281 E9/L (ref 130–450)
PMV BLD AUTO: 10 FL (ref 7–12)
RBC # BLD: 4.89 E12/L (ref 3.5–5.5)
WBC # BLD: 8.7 E9/L (ref 4.5–11.5)

## 2019-12-04 PROCEDURE — 83036 HEMOGLOBIN GLYCOSYLATED A1C: CPT

## 2019-12-04 PROCEDURE — 85025 COMPLETE CBC W/AUTO DIFF WBC: CPT

## 2019-12-13 DIAGNOSIS — Z96.652 S/P TKR (TOTAL KNEE REPLACEMENT) USING CEMENT, LEFT: Primary | ICD-10-CM

## 2019-12-19 ENCOUNTER — HOSPITAL ENCOUNTER (OUTPATIENT)
Dept: MAMMOGRAPHY | Age: 74
Discharge: HOME | End: 2019-12-21
Payer: COMMERCIAL

## 2019-12-19 DIAGNOSIS — Z12.31 ENCOUNTER FOR SCREENING MAMMOGRAM FOR MALIGNANT NEOPLASM OF BREAST: ICD-10-CM

## 2019-12-19 PROCEDURE — 77063 BREAST TOMOSYNTHESIS BI: CPT

## 2019-12-27 ENCOUNTER — HOSPITAL ENCOUNTER (OUTPATIENT)
Dept: MAMMOGRAPHY | Age: 74
Discharge: HOME | End: 2019-12-29
Payer: COMMERCIAL

## 2019-12-27 ENCOUNTER — HOSPITAL ENCOUNTER (OUTPATIENT)
Dept: ULTRASOUND IMAGING | Age: 74
Discharge: HOME | End: 2019-12-27
Payer: COMMERCIAL

## 2019-12-27 DIAGNOSIS — N64.89 BREAST ASYMMETRY: ICD-10-CM

## 2019-12-27 PROCEDURE — 76642 ULTRASOUND BREAST LIMITED: CPT

## 2019-12-27 PROCEDURE — G0279 TOMOSYNTHESIS, MAMMO: HCPCS

## 2020-03-05 ENCOUNTER — HOSPITAL ENCOUNTER (OUTPATIENT)
Age: 75
Discharge: HOME | End: 2020-03-07
Payer: MEDICARE

## 2020-03-05 LAB
ALBUMIN SERPL-MCNC: 3.8 G/DL (ref 3.5–5.2)
ALP BLD-CCNC: 80 U/L (ref 35–104)
ALT SERPL-CCNC: 22 U/L (ref 0–32)
ANION GAP SERPL CALCULATED.3IONS-SCNC: 13 MMOL/L (ref 7–16)
AST SERPL-CCNC: 27 U/L (ref 0–31)
BASOPHILS ABSOLUTE: 0.08 E9/L (ref 0–0.2)
BASOPHILS RELATIVE PERCENT: 0.9 % (ref 0–2)
BILIRUB SERPL-MCNC: 0.5 MG/DL (ref 0–1.2)
BUN BLDV-MCNC: 19 MG/DL (ref 8–23)
CALCIUM SERPL-MCNC: 10.1 MG/DL (ref 8.6–10.2)
CHLORIDE BLD-SCNC: 101 MMOL/L (ref 98–107)
CHOLESTEROL, TOTAL: 141 MG/DL (ref 0–199)
CO2: 26 MMOL/L (ref 22–29)
CREAT SERPL-MCNC: 1 MG/DL (ref 0.5–1)
EOSINOPHILS ABSOLUTE: 0.24 E9/L (ref 0.05–0.5)
EOSINOPHILS RELATIVE PERCENT: 2.6 % (ref 0–6)
GFR AFRICAN AMERICAN: >60
GFR NON-AFRICAN AMERICAN: 54 ML/MIN/1.73
GLUCOSE BLD-MCNC: 133 MG/DL (ref 74–99)
HCT VFR BLD CALC: 48.8 % (ref 34–48)
HDLC SERPL-MCNC: 45 MG/DL
HEMOGLOBIN: 15.1 G/DL (ref 11.5–15.5)
IMMATURE GRANULOCYTES #: 0.04 E9/L
IMMATURE GRANULOCYTES %: 0.4 % (ref 0–5)
LDL CHOLESTEROL CALCULATED: 59 MG/DL (ref 0–99)
LYMPHOCYTES ABSOLUTE: 1.44 E9/L (ref 1.5–4)
LYMPHOCYTES RELATIVE PERCENT: 15.6 % (ref 20–42)
MCH RBC QN AUTO: 28.8 PG (ref 26–35)
MCHC RBC AUTO-ENTMCNC: 30.9 % (ref 32–34.5)
MCV RBC AUTO: 93 FL (ref 80–99.9)
MONOCYTES ABSOLUTE: 0.81 E9/L (ref 0.1–0.95)
MONOCYTES RELATIVE PERCENT: 8.8 % (ref 2–12)
NEUTROPHILS ABSOLUTE: 6.64 E9/L (ref 1.8–7.3)
NEUTROPHILS RELATIVE PERCENT: 71.7 % (ref 43–80)
PDW BLD-RTO: 13.2 FL (ref 11.5–15)
PLATELET # BLD: 268 E9/L (ref 130–450)
PMV BLD AUTO: 10.2 FL (ref 7–12)
POTASSIUM SERPL-SCNC: 4.9 MMOL/L (ref 3.5–5)
RBC # BLD: 5.25 E12/L (ref 3.5–5.5)
SODIUM BLD-SCNC: 140 MMOL/L (ref 132–146)
TOTAL PROTEIN: 7.7 G/DL (ref 6.4–8.3)
TRIGL SERPL-MCNC: 186 MG/DL (ref 0–149)
TSH SERPL DL<=0.05 MIU/L-ACNC: 2.78 UIU/ML (ref 0.27–4.2)
VITAMIN D 25-HYDROXY: 46 NG/ML (ref 30–100)
VLDLC SERPL CALC-MCNC: 37 MG/DL
WBC # BLD: 9.3 E9/L (ref 4.5–11.5)

## 2020-03-05 PROCEDURE — 84443 ASSAY THYROID STIM HORMONE: CPT

## 2020-03-05 PROCEDURE — 82306 VITAMIN D 25 HYDROXY: CPT

## 2020-03-05 PROCEDURE — 80053 COMPREHEN METABOLIC PANEL: CPT

## 2020-03-05 PROCEDURE — 80061 LIPID PANEL: CPT

## 2020-03-05 PROCEDURE — 85025 COMPLETE CBC W/AUTO DIFF WBC: CPT

## 2020-03-11 ENCOUNTER — OFFICE VISIT (OUTPATIENT)
Dept: SURGERY | Age: 75
End: 2020-03-11
Payer: MEDICARE

## 2020-03-11 ENCOUNTER — HOSPITAL ENCOUNTER (OUTPATIENT)
Age: 75
Discharge: HOME | End: 2020-03-13
Payer: MEDICARE

## 2020-03-11 VITALS
DIASTOLIC BLOOD PRESSURE: 81 MMHG | BODY MASS INDEX: 42.68 KG/M2 | HEART RATE: 57 BPM | OXYGEN SATURATION: 96 % | HEIGHT: 64 IN | SYSTOLIC BLOOD PRESSURE: 172 MMHG | TEMPERATURE: 98 F | WEIGHT: 250 LBS

## 2020-03-11 LAB — HBA1C MFR BLD: 6.3 % (ref 4–5.6)

## 2020-03-11 PROCEDURE — 99213 OFFICE O/P EST LOW 20 MIN: CPT | Performed by: SURGERY

## 2020-03-11 PROCEDURE — 83036 HEMOGLOBIN GLYCOSYLATED A1C: CPT

## 2020-03-11 RX ORDER — TERBINAFINE HYDROCHLORIDE 250 MG/1
TABLET ORAL
COMMUNITY
Start: 2020-03-06 | End: 2020-06-01 | Stop reason: ALTCHOICE

## 2020-03-11 NOTE — PROGRESS NOTES
General Surgery History and Physical  [facility]    Patient's Name/Date of Birth: Nohemy Hernandez / 1945    Date: March 11, 2020     Surgeon: Mckenzie Pringle M.D.    PCP: Tiffanie Meng MD     Chief Complaint: Umbilical drainage, parastomal hernia    HPI:   Nohemy Hernandez is a 69 y.o. female who presents for evaluation of perastomal hernia enlarging and drainage from umbilicus. Timing is constant, radiation to RLQ and umbilicus, alleviated by none and started several weeks ago and severity is 6/10. Bloody drainage for last month with increasing amounts. State the ostomy nurse discussed with her that she has a hernia and thus the reason for the bulge around her stoma. Denies malfunction, no pain, stoma working well, no bleeding. Denies nausea, vomiting, fever, chills. Hx of subtotal colectomy, recent Cscope with 16cm remaining of rectum. SHe had refused to discuss reversal. Lap subtotal colectomy in July 2018 for GI bleed.      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

## 2020-03-16 ENCOUNTER — HOSPITAL ENCOUNTER (OUTPATIENT)
Dept: CT IMAGING | Age: 75
Discharge: HOME | End: 2020-03-16
Payer: MEDICARE

## 2020-03-16 PROCEDURE — 74177 CT ABD & PELVIS W/CONTRAST: CPT

## 2020-03-16 PROCEDURE — 6360000004 HC RX CONTRAST MEDICATION: Performed by: RADIOLOGY

## 2020-03-16 RX ADMIN — IOHEXOL 50 ML: 240 INJECTION, SOLUTION INTRATHECAL; INTRAVASCULAR; INTRAVENOUS; ORAL at 16:13

## 2020-03-16 RX ADMIN — IOPAMIDOL 80 ML: 755 INJECTION, SOLUTION INTRAVENOUS at 16:13

## 2020-03-25 PROBLEM — E03.9 ACQUIRED HYPOTHYROIDISM: Status: RESOLVED | Noted: 2017-05-11 | Resolved: 2020-03-24

## 2020-03-25 PROBLEM — I10 ESSENTIAL HYPERTENSION: Status: RESOLVED | Noted: 2017-05-11 | Resolved: 2020-03-24

## 2020-03-25 PROBLEM — M15.9 OSTEOARTHRITIS OF MULTIPLE JOINTS: Status: RESOLVED | Noted: 2017-05-11 | Resolved: 2020-03-24

## 2020-06-01 ENCOUNTER — TELEPHONE (OUTPATIENT)
Dept: SURGERY | Age: 75
End: 2020-06-01

## 2020-06-01 ENCOUNTER — OFFICE VISIT (OUTPATIENT)
Dept: SURGERY | Age: 75
End: 2020-06-01
Payer: MEDICARE

## 2020-06-01 VITALS
SYSTOLIC BLOOD PRESSURE: 185 MMHG | TEMPERATURE: 97.7 F | WEIGHT: 247 LBS | HEART RATE: 59 BPM | HEIGHT: 64 IN | BODY MASS INDEX: 42.17 KG/M2 | DIASTOLIC BLOOD PRESSURE: 88 MMHG

## 2020-06-01 PROCEDURE — 99213 OFFICE O/P EST LOW 20 MIN: CPT | Performed by: SURGERY

## 2020-06-01 RX ORDER — METFORMIN HYDROCHLORIDE 500 MG/1
TABLET, EXTENDED RELEASE ORAL
COMMUNITY
Start: 2020-03-11 | End: 2021-03-31 | Stop reason: ALTCHOICE

## 2020-06-01 RX ORDER — ASPIRIN 81 MG/1
81 TABLET ORAL DAILY
COMMUNITY

## 2020-06-01 NOTE — PROGRESS NOTES
General Surgery History and Physical  [facility]    Patient's Name/Date of Birth: Haroldo Metzger / 1945    Date: June 1, 2020     Surgeon: Palma Schaumann, M.D.    PCP: Lashawn Turner MD     Chief Complaint: Umbilical drainage, parastomal hernia    HPI:   Haroldo Metzger is a 77 y.o. female who presents for evaluation of perastomal hernia enlarging and drainage from umbilicus. Timing is constant, radiation to RLQ and umbilicus, alleviated by none and started several weeks ago and severity is 6/10. Bloody drainage for last month with increasing amounts. State the ostomy nurse discussed with her that she has a hernia and thus the reason for the bulge around her stoma. Denies malfunction, no pain, stoma working well, no bleeding. Denies nausea, vomiting, fever, chills. Hx of subtotal colectomy, recent Cscope with 16cm remaining of rectum. SHe had refused to discuss reversal. Lap subtotal colectomy in July 2018 for GI bleed. Presents again with pain around stoma and feels it is enlarging. No nausea, no vomiting.  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          Coty Aranda MD  9:16 AM  6/1/2020

## 2020-07-16 RX ORDER — SODIUM CHLORIDE 9 MG/ML
INJECTION, SOLUTION INTRAVENOUS CONTINUOUS
Status: CANCELLED | OUTPATIENT
Start: 2020-07-16

## 2020-07-16 RX ORDER — SODIUM CHLORIDE 0.9 % (FLUSH) 0.9 %
10 SYRINGE (ML) INJECTION PRN
Status: CANCELLED | OUTPATIENT
Start: 2020-07-16

## 2020-07-17 ENCOUNTER — HOSPITAL ENCOUNTER (OUTPATIENT)
Dept: PREADMISSION TESTING | Age: 75
Discharge: HOME | End: 2020-07-17
Payer: MEDICARE

## 2020-07-17 ENCOUNTER — HOSPITAL ENCOUNTER (OUTPATIENT)
Age: 75
Discharge: HOME | End: 2020-07-19
Payer: MEDICARE

## 2020-07-17 VITALS
BODY MASS INDEX: 42.19 KG/M2 | TEMPERATURE: 97.6 F | HEART RATE: 58 BPM | WEIGHT: 247.13 LBS | RESPIRATION RATE: 16 BRPM | DIASTOLIC BLOOD PRESSURE: 79 MMHG | HEIGHT: 64 IN | OXYGEN SATURATION: 97 % | SYSTOLIC BLOOD PRESSURE: 172 MMHG

## 2020-07-17 LAB
ANION GAP SERPL CALCULATED.3IONS-SCNC: 13 MMOL/L (ref 7–16)
BUN BLDV-MCNC: 17 MG/DL (ref 8–23)
CALCIUM SERPL-MCNC: 9.6 MG/DL (ref 8.6–10.2)
CHLORIDE BLD-SCNC: 102 MMOL/L (ref 98–107)
CO2: 26 MMOL/L (ref 22–29)
CREAT SERPL-MCNC: 1.1 MG/DL (ref 0.5–1)
GFR AFRICAN AMERICAN: 59
GFR NON-AFRICAN AMERICAN: 49 ML/MIN/1.73
GLUCOSE BLD-MCNC: 135 MG/DL (ref 74–99)
HCT VFR BLD CALC: 42.7 % (ref 34–48)
HEMOGLOBIN: 14.3 G/DL (ref 11.5–15.5)
MCH RBC QN AUTO: 30.4 PG (ref 26–35)
MCHC RBC AUTO-ENTMCNC: 33.5 % (ref 32–34.5)
MCV RBC AUTO: 90.7 FL (ref 80–99.9)
PDW BLD-RTO: 13 FL (ref 11.5–15)
PLATELET # BLD: 247 E9/L (ref 130–450)
PMV BLD AUTO: 9 FL (ref 7–12)
POTASSIUM REFLEX MAGNESIUM: 4.3 MMOL/L (ref 3.5–5)
RBC # BLD: 4.71 E12/L (ref 3.5–5.5)
SODIUM BLD-SCNC: 141 MMOL/L (ref 132–146)
WBC # BLD: 8.5 E9/L (ref 4.5–11.5)

## 2020-07-17 PROCEDURE — U0003 INFECTIOUS AGENT DETECTION BY NUCLEIC ACID (DNA OR RNA); SEVERE ACUTE RESPIRATORY SYNDROME CORONAVIRUS 2 (SARS-COV-2) (CORONAVIRUS DISEASE [COVID-19]), AMPLIFIED PROBE TECHNIQUE, MAKING USE OF HIGH THROUGHPUT TECHNOLOGIES AS DESCRIBED BY CMS-2020-01-R: HCPCS

## 2020-07-17 PROCEDURE — 36415 COLL VENOUS BLD VENIPUNCTURE: CPT

## 2020-07-17 PROCEDURE — 80048 BASIC METABOLIC PNL TOTAL CA: CPT

## 2020-07-17 PROCEDURE — 85027 COMPLETE CBC AUTOMATED: CPT

## 2020-07-17 ASSESSMENT — PAIN DESCRIPTION - DESCRIPTORS: DESCRIPTORS: ACHING

## 2020-07-17 ASSESSMENT — PAIN DESCRIPTION - PAIN TYPE: TYPE: CHRONIC PAIN

## 2020-07-17 ASSESSMENT — PAIN DESCRIPTION - ORIENTATION: ORIENTATION: LOWER

## 2020-07-17 ASSESSMENT — PAIN SCALES - GENERAL: PAINLEVEL_OUTOF10: 3

## 2020-07-17 ASSESSMENT — PAIN DESCRIPTION - LOCATION: LOCATION: BACK

## 2020-07-18 LAB
SARS-COV-2: NOT DETECTED
SOURCE: NORMAL

## 2020-07-21 ENCOUNTER — ANESTHESIA EVENT (OUTPATIENT)
Dept: OPERATING ROOM | Age: 75
DRG: 330 | End: 2020-07-21
Payer: MEDICARE

## 2020-07-21 ENCOUNTER — HOSPITAL ENCOUNTER (OUTPATIENT)
Age: 75
Setting detail: OUTPATIENT SURGERY
Discharge: HOME | DRG: 330 | End: 2020-07-21
Attending: SURGERY | Admitting: SURGERY
Payer: MEDICARE

## 2020-07-21 ENCOUNTER — ANESTHESIA (OUTPATIENT)
Dept: OPERATING ROOM | Age: 75
DRG: 330 | End: 2020-07-21
Payer: MEDICARE

## 2020-07-21 VITALS
DIASTOLIC BLOOD PRESSURE: 98 MMHG | TEMPERATURE: 95.5 F | OXYGEN SATURATION: 97 % | SYSTOLIC BLOOD PRESSURE: 150 MMHG | RESPIRATION RATE: 4 BRPM

## 2020-07-21 VITALS
DIASTOLIC BLOOD PRESSURE: 70 MMHG | RESPIRATION RATE: 20 BRPM | OXYGEN SATURATION: 93 % | HEART RATE: 51 BPM | SYSTOLIC BLOOD PRESSURE: 130 MMHG | TEMPERATURE: 97.1 F

## 2020-07-21 LAB — METER GLUCOSE: 127 MG/DL (ref 74–99)

## 2020-07-21 PROCEDURE — 6360000002 HC RX W HCPCS: Performed by: SURGERY

## 2020-07-21 PROCEDURE — 49655 PR LAP, INCISIONAL HERNIA REPAIR,INCARCERATED: CPT | Performed by: SURGERY

## 2020-07-21 PROCEDURE — 2580000003 HC RX 258: Performed by: SURGERY

## 2020-07-21 PROCEDURE — 2500000003 HC RX 250 WO HCPCS: Performed by: SURGERY

## 2020-07-21 PROCEDURE — 7100000000 HC PACU RECOVERY - FIRST 15 MIN: Performed by: SURGERY

## 2020-07-21 PROCEDURE — 2709999900 HC NON-CHARGEABLE SUPPLY: Performed by: SURGERY

## 2020-07-21 PROCEDURE — 3600000009 HC SURGERY ROBOT BASE: Performed by: SURGERY

## 2020-07-21 PROCEDURE — 7100000011 HC PHASE II RECOVERY - ADDTL 15 MIN: Performed by: SURGERY

## 2020-07-21 PROCEDURE — 6360000002 HC RX W HCPCS

## 2020-07-21 PROCEDURE — 3700000001 HC ADD 15 MINUTES (ANESTHESIA): Performed by: SURGERY

## 2020-07-21 PROCEDURE — 82962 GLUCOSE BLOOD TEST: CPT

## 2020-07-21 PROCEDURE — 44346 REVISION OF COLOSTOMY: CPT | Performed by: SURGERY

## 2020-07-21 PROCEDURE — 88302 TISSUE EXAM BY PATHOLOGIST: CPT

## 2020-07-21 PROCEDURE — 3600000019 HC SURGERY ROBOT ADDTL 15MIN: Performed by: SURGERY

## 2020-07-21 PROCEDURE — 2500000003 HC RX 250 WO HCPCS: Performed by: ANESTHESIOLOGIST ASSISTANT

## 2020-07-21 PROCEDURE — 8E0W4CZ ROBOTIC ASSISTED PROCEDURE OF TRUNK REGION, PERCUTANEOUS ENDOSCOPIC APPROACH: ICD-10-PCS | Performed by: SURGERY

## 2020-07-21 PROCEDURE — 0WUF4JZ SUPPLEMENT ABDOMINAL WALL WITH SYNTHETIC SUBSTITUTE, PERCUTANEOUS ENDOSCOPIC APPROACH: ICD-10-PCS | Performed by: SURGERY

## 2020-07-21 PROCEDURE — C1781 MESH (IMPLANTABLE): HCPCS | Performed by: SURGERY

## 2020-07-21 PROCEDURE — 7100000001 HC PACU RECOVERY - ADDTL 15 MIN: Performed by: SURGERY

## 2020-07-21 PROCEDURE — 7100000010 HC PHASE II RECOVERY - FIRST 15 MIN: Performed by: SURGERY

## 2020-07-21 PROCEDURE — 3700000000 HC ANESTHESIA ATTENDED CARE: Performed by: SURGERY

## 2020-07-21 PROCEDURE — S2900 ROBOTIC SURGICAL SYSTEM: HCPCS | Performed by: SURGERY

## 2020-07-21 PROCEDURE — 6360000002 HC RX W HCPCS: Performed by: ANESTHESIOLOGIST ASSISTANT

## 2020-07-21 PROCEDURE — 6360000002 HC RX W HCPCS: Performed by: ANESTHESIOLOGY

## 2020-07-21 DEVICE — MESH SURG DIA4.5IN CIR SEPRA TECHNOLOGY VENTRALIGHT: Type: IMPLANTABLE DEVICE | Site: ABDOMEN | Status: FUNCTIONAL

## 2020-07-21 RX ORDER — LIDOCAINE HYDROCHLORIDE 20 MG/ML
INJECTION, SOLUTION INTRAVENOUS PRN
Status: DISCONTINUED | OUTPATIENT
Start: 2020-07-21 | End: 2020-07-21 | Stop reason: SDUPTHER

## 2020-07-21 RX ORDER — SODIUM CHLORIDE 0.9 % (FLUSH) 0.9 %
10 SYRINGE (ML) INJECTION PRN
Status: DISCONTINUED | OUTPATIENT
Start: 2020-07-21 | End: 2020-07-21 | Stop reason: CLARIF

## 2020-07-21 RX ORDER — ROCURONIUM BROMIDE 10 MG/ML
INJECTION, SOLUTION INTRAVENOUS PRN
Status: DISCONTINUED | OUTPATIENT
Start: 2020-07-21 | End: 2020-07-21 | Stop reason: SDUPTHER

## 2020-07-21 RX ORDER — MEPERIDINE HYDROCHLORIDE 25 MG/ML
INJECTION INTRAMUSCULAR; INTRAVENOUS; SUBCUTANEOUS
Status: COMPLETED
Start: 2020-07-21 | End: 2020-07-21

## 2020-07-21 RX ORDER — SODIUM CHLORIDE 9 MG/ML
INJECTION, SOLUTION INTRAVENOUS CONTINUOUS
Status: DISCONTINUED | OUTPATIENT
Start: 2020-07-21 | End: 2020-07-21 | Stop reason: HOSPADM

## 2020-07-21 RX ORDER — LABETALOL HYDROCHLORIDE 5 MG/ML
5 INJECTION, SOLUTION INTRAVENOUS EVERY 10 MIN PRN
Status: DISCONTINUED | OUTPATIENT
Start: 2020-07-21 | End: 2020-07-21 | Stop reason: HOSPADM

## 2020-07-21 RX ORDER — HYDRALAZINE HYDROCHLORIDE 20 MG/ML
5 INJECTION INTRAMUSCULAR; INTRAVENOUS EVERY 10 MIN PRN
Status: DISCONTINUED | OUTPATIENT
Start: 2020-07-21 | End: 2020-07-21 | Stop reason: HOSPADM

## 2020-07-21 RX ORDER — ONDANSETRON 2 MG/ML
4 INJECTION INTRAMUSCULAR; INTRAVENOUS
Status: DISCONTINUED | OUTPATIENT
Start: 2020-07-21 | End: 2020-07-21 | Stop reason: HOSPADM

## 2020-07-21 RX ORDER — FENTANYL CITRATE 50 UG/ML
INJECTION, SOLUTION INTRAMUSCULAR; INTRAVENOUS PRN
Status: DISCONTINUED | OUTPATIENT
Start: 2020-07-21 | End: 2020-07-21 | Stop reason: SDUPTHER

## 2020-07-21 RX ORDER — DEXAMETHASONE SODIUM PHOSPHATE 4 MG/ML
INJECTION, SOLUTION INTRA-ARTICULAR; INTRALESIONAL; INTRAMUSCULAR; INTRAVENOUS; SOFT TISSUE PRN
Status: DISCONTINUED | OUTPATIENT
Start: 2020-07-21 | End: 2020-07-21 | Stop reason: SDUPTHER

## 2020-07-21 RX ORDER — IBUPROFEN 800 MG/1
800 TABLET ORAL EVERY 6 HOURS PRN
Qty: 90 TABLET | Refills: 1 | Status: ON HOLD | OUTPATIENT
Start: 2020-07-21 | End: 2020-07-24

## 2020-07-21 RX ORDER — OXYCODONE HYDROCHLORIDE AND ACETAMINOPHEN 5; 325 MG/1; MG/1
1 TABLET ORAL EVERY 6 HOURS PRN
Qty: 18 TABLET | Refills: 0 | Status: SHIPPED | OUTPATIENT
Start: 2020-07-21 | End: 2020-07-26

## 2020-07-21 RX ORDER — PROPOFOL 10 MG/ML
INJECTION, EMULSION INTRAVENOUS PRN
Status: DISCONTINUED | OUTPATIENT
Start: 2020-07-21 | End: 2020-07-21 | Stop reason: SDUPTHER

## 2020-07-21 RX ORDER — PROMETHAZINE HYDROCHLORIDE 25 MG/ML
6.25 INJECTION, SOLUTION INTRAMUSCULAR; INTRAVENOUS
Status: DISCONTINUED | OUTPATIENT
Start: 2020-07-21 | End: 2020-07-21 | Stop reason: HOSPADM

## 2020-07-21 RX ORDER — ONDANSETRON 4 MG/1
4 TABLET, ORALLY DISINTEGRATING ORAL EVERY 8 HOURS PRN
Qty: 20 TABLET | Refills: 1 | Status: SHIPPED | OUTPATIENT
Start: 2020-07-21 | End: 2021-03-31 | Stop reason: ALTCHOICE

## 2020-07-21 RX ORDER — SODIUM CHLORIDE 0.9 % (FLUSH) 0.9 %
10 SYRINGE (ML) INJECTION PRN
Status: DISCONTINUED | OUTPATIENT
Start: 2020-07-21 | End: 2020-07-21 | Stop reason: HOSPADM

## 2020-07-21 RX ORDER — ONDANSETRON 2 MG/ML
INJECTION INTRAMUSCULAR; INTRAVENOUS PRN
Status: DISCONTINUED | OUTPATIENT
Start: 2020-07-21 | End: 2020-07-21 | Stop reason: SDUPTHER

## 2020-07-21 RX ORDER — MIDAZOLAM HYDROCHLORIDE 1 MG/ML
INJECTION INTRAMUSCULAR; INTRAVENOUS PRN
Status: DISCONTINUED | OUTPATIENT
Start: 2020-07-21 | End: 2020-07-21 | Stop reason: SDUPTHER

## 2020-07-21 RX ORDER — GLYCOPYRROLATE 1 MG/5 ML
SYRINGE (ML) INTRAVENOUS PRN
Status: DISCONTINUED | OUTPATIENT
Start: 2020-07-21 | End: 2020-07-21 | Stop reason: SDUPTHER

## 2020-07-21 RX ORDER — SODIUM CHLORIDE 0.9 % (FLUSH) 0.9 %
10 SYRINGE (ML) INJECTION EVERY 12 HOURS SCHEDULED
Status: DISCONTINUED | OUTPATIENT
Start: 2020-07-21 | End: 2020-07-21 | Stop reason: HOSPADM

## 2020-07-21 RX ORDER — HEPARIN SODIUM 5000 [USP'U]/ML
5000 INJECTION, SOLUTION INTRAVENOUS; SUBCUTANEOUS ONCE
Status: COMPLETED | OUTPATIENT
Start: 2020-07-21 | End: 2020-07-21

## 2020-07-21 RX ORDER — BUPIVACAINE HYDROCHLORIDE AND EPINEPHRINE 2.5; 5 MG/ML; UG/ML
INJECTION, SOLUTION EPIDURAL; INFILTRATION; INTRACAUDAL; PERINEURAL PRN
Status: DISCONTINUED | OUTPATIENT
Start: 2020-07-21 | End: 2020-07-21 | Stop reason: ALTCHOICE

## 2020-07-21 RX ORDER — NEOSTIGMINE METHYLSULFATE 1 MG/ML
INJECTION, SOLUTION INTRAVENOUS PRN
Status: DISCONTINUED | OUTPATIENT
Start: 2020-07-21 | End: 2020-07-21 | Stop reason: SDUPTHER

## 2020-07-21 RX ORDER — MEPERIDINE HYDROCHLORIDE 25 MG/ML
12.5 INJECTION INTRAMUSCULAR; INTRAVENOUS; SUBCUTANEOUS EVERY 5 MIN PRN
Status: DISCONTINUED | OUTPATIENT
Start: 2020-07-21 | End: 2020-07-21 | Stop reason: HOSPADM

## 2020-07-21 RX ADMIN — SODIUM CHLORIDE: 9 INJECTION, SOLUTION INTRAVENOUS at 11:56

## 2020-07-21 RX ADMIN — MEPERIDINE HYDROCHLORIDE 12.5 MG: 25 INJECTION INTRAMUSCULAR; INTRAVENOUS; SUBCUTANEOUS at 13:20

## 2020-07-21 RX ADMIN — FENTANYL CITRATE 50 MCG: 50 INJECTION, SOLUTION INTRAMUSCULAR; INTRAVENOUS at 11:39

## 2020-07-21 RX ADMIN — Medication 0.2 MG: at 11:33

## 2020-07-21 RX ADMIN — METRONIDAZOLE 500 MG: 500 INJECTION, SOLUTION INTRAVENOUS at 11:23

## 2020-07-21 RX ADMIN — MIDAZOLAM 2 MG: 1 INJECTION INTRAMUSCULAR; INTRAVENOUS at 11:13

## 2020-07-21 RX ADMIN — Medication 0.6 MG: at 12:59

## 2020-07-21 RX ADMIN — ROCURONIUM BROMIDE 40 MG: 10 SOLUTION INTRAVENOUS at 11:20

## 2020-07-21 RX ADMIN — Medication 3 MG: at 12:59

## 2020-07-21 RX ADMIN — MEPERIDINE HYDROCHLORIDE 12.5 MG: 25 INJECTION INTRAMUSCULAR; INTRAVENOUS; SUBCUTANEOUS at 13:25

## 2020-07-21 RX ADMIN — PROPOFOL 200 MG: 10 INJECTION, EMULSION INTRAVENOUS at 11:19

## 2020-07-21 RX ADMIN — FENTANYL CITRATE 50 MCG: 50 INJECTION, SOLUTION INTRAMUSCULAR; INTRAVENOUS at 11:19

## 2020-07-21 RX ADMIN — HEPARIN SODIUM 5000 UNITS: 5000 INJECTION, SOLUTION INTRAVENOUS; SUBCUTANEOUS at 10:00

## 2020-07-21 RX ADMIN — DEXAMETHASONE SODIUM PHOSPHATE 4 MG: 4 INJECTION, SOLUTION INTRAMUSCULAR; INTRAVENOUS at 11:20

## 2020-07-21 RX ADMIN — FENTANYL CITRATE 50 MCG: 50 INJECTION, SOLUTION INTRAMUSCULAR; INTRAVENOUS at 11:41

## 2020-07-21 RX ADMIN — LIDOCAINE HYDROCHLORIDE 100 MG: 20 INJECTION, SOLUTION INTRAVENOUS at 11:19

## 2020-07-21 RX ADMIN — Medication 0.2 MG: at 11:38

## 2020-07-21 RX ADMIN — SODIUM CHLORIDE: 9 INJECTION, SOLUTION INTRAVENOUS at 09:55

## 2020-07-21 RX ADMIN — ONDANSETRON 4 MG: 2 INJECTION INTRAMUSCULAR; INTRAVENOUS at 12:59

## 2020-07-21 RX ADMIN — CEFTRIAXONE SODIUM 1 G: 1 INJECTION, POWDER, FOR SOLUTION INTRAMUSCULAR; INTRAVENOUS at 11:41

## 2020-07-21 ASSESSMENT — PULMONARY FUNCTION TESTS
PIF_VALUE: 33
PIF_VALUE: 0
PIF_VALUE: 35
PIF_VALUE: 31
PIF_VALUE: 35
PIF_VALUE: 4
PIF_VALUE: 34
PIF_VALUE: 35
PIF_VALUE: 35
PIF_VALUE: 20
PIF_VALUE: 0
PIF_VALUE: 34
PIF_VALUE: 33
PIF_VALUE: 27
PIF_VALUE: 5
PIF_VALUE: 34
PIF_VALUE: 33
PIF_VALUE: 27
PIF_VALUE: 33
PIF_VALUE: 33
PIF_VALUE: 35
PIF_VALUE: 34
PIF_VALUE: 27
PIF_VALUE: 33
PIF_VALUE: 23
PIF_VALUE: 35
PIF_VALUE: 32
PIF_VALUE: 33
PIF_VALUE: 31
PIF_VALUE: 25
PIF_VALUE: 22
PIF_VALUE: 25
PIF_VALUE: 0
PIF_VALUE: 26
PIF_VALUE: 35
PIF_VALUE: 18
PIF_VALUE: 34
PIF_VALUE: 35
PIF_VALUE: 35
PIF_VALUE: 11
PIF_VALUE: 33
PIF_VALUE: 34
PIF_VALUE: 36
PIF_VALUE: 22
PIF_VALUE: 34
PIF_VALUE: 35
PIF_VALUE: 35
PIF_VALUE: 0
PIF_VALUE: 34
PIF_VALUE: 35
PIF_VALUE: 33
PIF_VALUE: 25
PIF_VALUE: 33
PIF_VALUE: 34
PIF_VALUE: 35
PIF_VALUE: 25
PIF_VALUE: 26
PIF_VALUE: 32
PIF_VALUE: 35
PIF_VALUE: 21
PIF_VALUE: 1
PIF_VALUE: 35
PIF_VALUE: 21
PIF_VALUE: 35
PIF_VALUE: 19
PIF_VALUE: 19
PIF_VALUE: 27
PIF_VALUE: 33
PIF_VALUE: 35
PIF_VALUE: 34
PIF_VALUE: 34
PIF_VALUE: 33
PIF_VALUE: 34
PIF_VALUE: 35
PIF_VALUE: 24
PIF_VALUE: 24
PIF_VALUE: 31
PIF_VALUE: 33
PIF_VALUE: 20
PIF_VALUE: 33
PIF_VALUE: 25
PIF_VALUE: 21
PIF_VALUE: 35
PIF_VALUE: 33
PIF_VALUE: 35
PIF_VALUE: 33
PIF_VALUE: 6
PIF_VALUE: 35
PIF_VALUE: 24
PIF_VALUE: 35
PIF_VALUE: 0
PIF_VALUE: 34
PIF_VALUE: 34
PIF_VALUE: 24
PIF_VALUE: 3
PIF_VALUE: 25
PIF_VALUE: 35
PIF_VALUE: 33
PIF_VALUE: 34
PIF_VALUE: 27
PIF_VALUE: 25
PIF_VALUE: 0
PIF_VALUE: 35
PIF_VALUE: 32
PIF_VALUE: 27
PIF_VALUE: 19
PIF_VALUE: 35
PIF_VALUE: 27
PIF_VALUE: 35
PIF_VALUE: 24
PIF_VALUE: 24
PIF_VALUE: 35

## 2020-07-21 ASSESSMENT — PAIN DESCRIPTION - PROGRESSION
CLINICAL_PROGRESSION: GRADUALLY WORSENING
CLINICAL_PROGRESSION: NOT CHANGED

## 2020-07-21 ASSESSMENT — PAIN SCALES - GENERAL
PAINLEVEL_OUTOF10: 7
PAINLEVEL_OUTOF10: 7
PAINLEVEL_OUTOF10: 0
PAINLEVEL_OUTOF10: 6
PAINLEVEL_OUTOF10: 6

## 2020-07-21 ASSESSMENT — PAIN DESCRIPTION - LOCATION
LOCATION: ABDOMEN

## 2020-07-21 ASSESSMENT — PAIN DESCRIPTION - PAIN TYPE
TYPE: SURGICAL PAIN

## 2020-07-21 ASSESSMENT — PAIN DESCRIPTION - ONSET: ONSET: GRADUAL

## 2020-07-21 ASSESSMENT — PAIN - FUNCTIONAL ASSESSMENT: PAIN_FUNCTIONAL_ASSESSMENT: 0-10

## 2020-07-21 ASSESSMENT — PAIN DESCRIPTION - DESCRIPTORS
DESCRIPTORS: ACHING;PRESSURE
DESCRIPTORS: PRESSURE
DESCRIPTORS: ACHING;TENDER

## 2020-07-21 NOTE — H&P
rarely     Drug use: No    Sexual activity: Not on file   Lifestyle    Physical activity     Days per week: Not on file     Minutes per session: Not on file    Stress: Not on file   Relationships    Social connections     Talks on phone: Not on file     Gets together: Not on file     Attends Gnosticism service: Not on file     Active member of club or organization: Not on file     Attends meetings of clubs or organizations: Not on file     Relationship status: Not on file    Intimate partner violence     Fear of current or ex partner: Not on file     Emotionally abused: Not on file     Physically abused: Not on file     Forced sexual activity: Not on file   Other Topics Concern    Not on file   Social History Narrative    Not on file       I have reviewed relevant labs from this admission and interpretation is included in my assessment and plan    Review of Systems    A complete 10 system review was performed and are otherwise negative unless mentioned in the above HPI. Specific negatives are listed below but may not include all those reviewed. General ROS: negative obtundation, AMS  ENT ROS: negative rhinorrhea, epistaxis  Allergy and Immunology ROS: negative itchy/watery eyes or nasal congestion  Hematological and Lymphatic ROS: negative spontaneous bleeding or bruising  Endocrine ROS: negative  lethargy, mood swings, palpitations or polydipsia/polyuria  Respiratory ROS: negative sputum changes, stridor, tachypnea or wheezing  Cardiovascular ROS: negative for - loss of consciousness, murmur or orthopnea  Gastrointestinal ROS: negative for - hematochezia or hematemesis  Genito-Urinary ROS: negative for -  genital discharge or hematuria  Musculoskeletal ROS: negative for - focal weakness, gangrene  Psych/Neuro ROS: negative for - visual or auditory hallucinations, suicidal ideation    Physical exam:   There were no vitals taken for this visit.   General appearance:  NAD, appears stated age  Head: NCAT, PERRLA, EOMI, red conjunctiva  Neck: supple, no masses, trachea midline  Lungs: Equal chest rise bilateral, no retractions, no wheezing  Heart: Reg rate  Abdomen: soft, nontender, obese, partially reducible parastomal hernia, stool and air in bag, nondistended, open wound at base of umbilicus with no signs of infection, no mass, no stitch visible. Skin; warm and dry, no cyanosis  Gu: no cva tenderness  Extremities: atraumatic, no focal motor deficits, no open wounds  Psych: No tremor, visual hallucinations      Radiology: I reviewed relevant abdominal imaging from this admission and that available in the EMR including CT abd/pel from June 2018. My assessment is no relevant imaging    Assessment:  Favian Joseph is a 77 y.o. 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 The patient understands the risks and alternatives and the possibility of converting to an open procedure. All questions were answered to the patient's satisfaction and they freely signed the consent.          Stephy Hagan MD  8:36 AM  7/21/2020

## 2020-07-21 NOTE — PROGRESS NOTES
7/21/20 1500 reviewed discharge instructions with pt and her daughter Tianna Kingston.  Both verbalized understanding, signed in agreement and given copy. lesvia jackson

## 2020-07-21 NOTE — BRIEF OP NOTE
Brief Postoperative Note      Patient: Kristine Best  YOB: 1945  MRN: [id number]    Date of Procedure: 7/21/2020    Pre-Op Diagnosis: PARASTOMAL HERNIA    Post-Op Diagnosis: Same       Procedure(s):  PARASTOMAL HERNIA REPAIR WITH MESH, LAPAROSCOPIC ROBOTIC XI ASSISTED; Revision end ileostomy    Surgeon(s):  Rhea Guerrero MD    Assistant:  First Assistant: Jossie Espinoza    Anesthesia: General    Estimated Blood Loss (mL): 24GF    Complications: None    Specimens:   ID Type Source Tests Collected by Time Destination   A : HERNIA [facility] Specimen Abdomen SURGICAL PATHOLOGY Rhea Guerrero MD 7/21/2020 2445        Implants:  Implant Name Type Inv.  Item Serial No.  Lot No. LRB No. Used Action   MESH SURG PATCH ERICK V[address]TR[address]IGHT ST CIR 4.5IN Mesh MESH SURG PATCH ERICK VENTRALIGHT ST CIR 4.5IN  CR [employer] [id number] N/A 1 Implanted         Drains:   Colostomy RLQ (Active)       Findings: see op note  [id number]    Electronically signed by Rhea Guerrero MD on 7/21/2020 at 1:03 PM

## 2020-07-21 NOTE — ANESTHESIA PRE PROCEDURE
Department of Anesthesiology  Preprocedure Note       Name:  Moo Arriaga   Age:  77 y.o.  :  1945                                          MRN:  [id number]         Date:  2020      Surgeon: Catherine Noyola):  Garry Stanley MD    Procedure: PARASTOMAL HERNIA REPAIR WITH MESH, LAPAROSCOPIC ROBOTIC XI ASSISTED, POSS OPEN (N/A )    Medications prior to admission:   Prior to Admission medications    Medication Sig Start Date End Date Taking?  Authorizing Provider   sotalol (BETAPACE) 80 MG tablet Take 1 tablet by mouth 2 times daily 18  Yes Deshaun Bustos MD   TURMERIC PO Take by mouth daily    Historical Provider, MD   NONFORMULARY Natural supplement/anti inflammatory    Historical Provider, MD   metFORMIN (GLUCOPHAGE-XR) 500 MG extended release tablet TAKE ONE TABLET BY MOUTH TWO TIMES A DAY AFTER MEALS 3/11/20   Historical Provider, MD   aspirin 81 MG EC tablet Take 81 mg by mouth daily    Historical Provider, MD   Citalopram Hydrobromide (CELEXA PO) Take by mouth nightly     Historical Provider, MD   Misc Natural Products (GLUCOSAMINE CHONDROITIN ADV PO) Take 1 tablet by mouth daily    Historical Provider, MD   losartan-hydrochlorothiazide (HYZAAR) 50-12.5 MG per tablet Take 1 tablet by mouth daily    Historical Provider, MD   vitamin D-3 (CHOLECALCIFEROL) 5000 UNITS TABS Take 5,000 Units by mouth daily    Historical Provider, MD   celecoxib (CELEBREX) 200 MG capsule Take 200 mg by mouth nightly     Historical Provider, MD   levothyroxine (SYNTHROID) 50 MCG tablet Take 50 mcg by mouth nightly     Historical Provider, MD       Current medications:    Current Facility-Administered Medications   Medication Dose Route Frequency Provider Last Rate Last Dose    0.9 % sodium chloride infusion   Intravenous Continuous Garry Stanley  mL/hr at 20 0955      sodium chloride flush 0.9 % injection 10 mL  10 mL Intravenous 2 times per day Garry Stanley MD        sodium chloride flush 0.9 % COLECTOMY POSS ILEOSTOMY performed by Rajni Mcghee MD at [address] 1/22/2019    SIGMOIDOSCOPY FLEXIBLE performed by Rajni Mcghee MD at [address] Left 6/11/2019    LEFT TOTAL KNEE ARTHROPLASTY (LUIS ENRIQUE) performed by Deb Eller DO at [address]         Social History:    Social History     Tobacco Use    Smoking status: Never Smoker    Smokeless tobacco: Never Used   Substance Use Topics    Alcohol use: Yes     Comment: rarely                                 Counseling given: Not Answered      Vital Signs (Current):   Vitals:    07/21/20 0932   BP: (!) 163/85   Pulse: 58   Resp: 16   Temp: 98.1 °F (36.7 °C)   TempSrc: Temporal   SpO2: 95%                                              BP Readings from Last 3 Encounters:   07/21/20 (!) 163/85   07/17/20 (!) 172/79   06/01/20 (!) 185/88       NPO Status: Time of last liquid consumption: 2330                        Time of last solid consumption: 1300                        Date of last liquid consumption: 07/20/20                        Date of last solid food consumption: 07/20/20    BMI:   Wt Readings from Last 3 Encounters:   07/17/20 247 lb 2 oz (112.1 kg)   06/01/20 247 lb (112 kg)   03/11/20 250 lb (113.4 kg)     There is no height or weight on file to calculate BMI.    CBC:   Lab Results   Component Value Date    WBC 8.5 07/17/2020    RBC 4.71 07/17/2020    HGB 14.3 07/17/2020    HCT 42.7 07/17/2020    MCV 90.7 07/17/2020    RDW 13.0 07/17/2020     07/17/2020       CMP:   Lab Results   Component Value Date     07/17/2020    K 4.3 07/17/2020     07/17/2020    CO2 26 07/17/2020    BUN 17 07/17/2020    CREATININE 1.1 07/17/2020    GFRAA 59 07/17/2020    LABGLOM 49 07/17/2020    GLUCOSE 135 07/17/2020    GLUCOSE 87 03/22/2012    PROT 7.7 03/05/2020    CALCIUM 9.6 07/17/2020    BILITOT 0.5 03/05/2020    ALKPHOS 80 03/05/2020    AST 27 03/05/2020    ALT 22

## 2020-07-22 ENCOUNTER — HOSPITAL ENCOUNTER (INPATIENT)
Age: 75
LOS: 2 days | Discharge: HOME | DRG: 330 | End: 2020-07-26
Attending: EMERGENCY MEDICINE | Admitting: SURGERY
Payer: MEDICARE

## 2020-07-22 PROCEDURE — 99285 EMERGENCY DEPT VISIT HI MDM: CPT

## 2020-07-22 NOTE — OP NOTE
[address]                                OPERATIVE REPORT    PATIENT NAME: Bubba Vásquez                    :        1945  MED REC NO:   [id number]                            ROOM:  ACCOUNT NO:   [de-identified]                           ADMIT DATE: 2020  PROVIDER:     Yuni Zurita MD    DATE OF PROCEDURE:  2020    PREOPERATIVE DIAGNOSES:  Parastomal hernia, skin breakdown around the  end-ileostomy. POSTOPERATIVE DIAGNOSES:  Parastomal hernia, skin breakdown around the  end-ileostomy. PROCEDURE PERFORMED:  Laparoscopic robotic-assisted parastomal hernia  repair with mesh and revision of end-ileostomy. SURGEON:  Yuni Zuirta MD    ANESTHESIA:  General endotracheal.    ASSISTANTS:  None. COMPLICATIONS:  None. ESTIMATED BLOOD LOSS:  10 mL. FLUIDS: Crystalloid. SPECIMENS:  Hernia sac and contents. IMPLANTS PLACED:  A Ventralight ST circular 4.5-inch mesh. DESCRIPTION OF PROCEDURE:  This is a 62-year-old female with a history  of a subtotal colectomy, bleeding with an end-ileostomy. She developed  parastomal hernia with evidence of omentum and small bowel incarcerated  within it. It was enlarging and she continued to have trouble obtaining  a seal around her stoma as well as pain from the hernia site. After  being explained the risks, benefits, and alternatives of the procedure,  she agreed to proceed. She was taken to the operating room, placed  supine, administered general anesthesia, and intubated. Once the airway  was secured and she was adequately sedated, she was prepped and draped  in normal sterile fashion. Time-out was performed to confirm the  surgical site and the patient's name. She received preoperative  antibiotics IV within 30 minutes of incision.   I initially made an 8-mm  incision in the patient's left upper quadrant, inserted a Veress needle,  confirmed the needle placement, insufflated to 15 mmHg. We inserted an  8-mm robotic trocar. Prior to making my incision, I did pursestring  close the ileostomy site in order to protect the area of the seal that  was prepped from any stool contamination. I then inserted the camera  and inspected the abdomen. There was evidence of omentum incarcerated  within the hernia sac. There was also evidence of small bowel that was  redundant within it. I inserted two more 8-mm trocars, one in the left  lower quadrant and one in the left lateral abdomen. Under direct  visualization, we _____  the anterosuperior iliac spine and  the costal margin. I then started my dissection initially taking down  the omentum and reducing it from the hernia sac. Hernia sac measured  approximately 7 cm in diameter with an area of small bowel coursing into  it. Once majority of the small bowel contents had been reduced, there  was a 3-cm width area of terminal ileum with mesentery within the hernia  site. I initially started my dissection in the left side of the  umbilicus, created a preperitoneal flap and dissected it across the  midline, across the umbilicus reducing the umbilical hernia that  contained fat. I continued my dissection laterally until I encountered  the hernia sac itself. I dissected the hernia sac up into the abdominal  cavity. I used the robotic camera to drive up within the hernia sac and  I was able to take down the hernia sac from direct visualization using  hot scissors and blunt dissection. I dissected into a retrorectus at  this point as I moved laterally and secured about a 12 x 10 cm  retromuscular space using hot scissors blunt dissection and some sharp  dissection, I dissected out lateral and to the edge of the right side of  the rectus muscle.   At this point, I was satisfied that I had about 4 cm  lateral area in the retrorectus space and about another 5 cm medial to  the defect, a total of about again 12 x 11 cm retromuscular pocket had  been created. Hemostasis was achieved with the cautery. At this point,  I measured as previously stated and selected a piece of 4.5 inch round  or 11.4 cm round Ventralight ST mesh, created a keyhole defect  approximately 3 cm in diameter. I initially used a PDS Stratafix suture  9 inches in length in order to run inferiorly to reapproximate the  muscular edges, creating about a 3-cm round fascial defect that easily  surrounded the ileum. I did not make it too tight to strangulate the  ileum. I also was able to prevent any injury to the small bowel that  was due to dissection. I then placed bacitracin in the pocket, I had  unfolded easily, the keyhole was placed at the apex of the abdominal  wall in a cranial fashion whereas the closure of muscle was inferiorly  and caudad. The mesh easily surrounded the parastomal area and it was  not too tight around the terminal ileum. I then used two barbed 6-inch  absorbable sutures in order to secure it to the retromuscular space. I  then closed the peritoneum and occluded the mesh in a retromuscular  fashion using an absorbable 9-inch barbed suture. At this point, the  mesh was completely covered by the peritoneum. The terminal ileum was  coursing through the middle of the keyhole and into the abdominal wall  and that was tension free. I used a separate 6-inch absorbable barbed  suture in order to suture the inferior aspect of the perineum up to the  mesh itself. I then removed two separate pieces and labeled them hernia  sac that had been removed from within the hernia cavity. I then  decompressed the abdomen and removed each one of the trocars. Inspecting the stoma, I reopened it by cutting the silk suture. Inferior aspect of the stoma had a very raw area of tissue.   I used a  crescent incision in order to surround this area that appeared  nonadherent and appeared difficult for her to obtain a seal. I used my  dissection to surround up to the terminal ileum at its matured site and  I excised all nonviable tissue. I then used interrupted 3-0 Vicryl  sutures in order to reapproximate the edge of the skin to the new  inferior aspect of the terminal ileostomy. This revised the stoma to  the point where I could easily get a seal with ostomy bag and the ostomy  appliance was applied. The patient was then awoken, extubated, and  transferred to the postoperative care unit in stable condition. All  instrument counts, lap counts, and needle counts were correct at the  completion of the procedure.         Concepción Guillen MD    D: 07/21/2020 13:58:51       T: 07/21/2020 15:27:47     [email or web address]  Job#: [id number]     Doc#: [id number]    CC:  Yao Espino MD

## 2020-07-23 ENCOUNTER — APPOINTMENT (OUTPATIENT)
Dept: CT IMAGING | Age: 75
DRG: 330 | End: 2020-07-23
Payer: MEDICARE

## 2020-07-23 ENCOUNTER — ANESTHESIA (OUTPATIENT)
Dept: OPERATING ROOM | Age: 75
DRG: 330 | End: 2020-07-23
Payer: MEDICARE

## 2020-07-23 ENCOUNTER — ANESTHESIA EVENT (OUTPATIENT)
Dept: OPERATING ROOM | Age: 75
DRG: 330 | End: 2020-07-23
Payer: MEDICARE

## 2020-07-23 VITALS
OXYGEN SATURATION: 97 % | SYSTOLIC BLOOD PRESSURE: 167 MMHG | DIASTOLIC BLOOD PRESSURE: 116 MMHG | RESPIRATION RATE: 13 BRPM | TEMPERATURE: 97.9 F

## 2020-07-23 PROBLEM — K56.609 SMALL BOWEL OBSTRUCTION (HCC): Status: ACTIVE | Noted: 2020-07-23

## 2020-07-23 LAB
ALBUMIN SERPL-MCNC: 3.8 G/DL (ref 3.5–5.2)
ALP BLD-CCNC: 73 U/L (ref 35–104)
ALT SERPL-CCNC: 16 U/L (ref 0–32)
ANION GAP SERPL CALCULATED.3IONS-SCNC: 12 MMOL/L (ref 7–16)
ANION GAP SERPL CALCULATED.3IONS-SCNC: 13 MMOL/L (ref 7–16)
AST SERPL-CCNC: 21 U/L (ref 0–31)
BACTERIA: NORMAL /HPF
BASOPHILS ABSOLUTE: 0.04 E9/L (ref 0–0.2)
BASOPHILS ABSOLUTE: 0.05 E9/L (ref 0–0.2)
BASOPHILS RELATIVE PERCENT: 0.4 % (ref 0–2)
BASOPHILS RELATIVE PERCENT: 0.5 % (ref 0–2)
BILIRUB SERPL-MCNC: 0.4 MG/DL (ref 0–1.2)
BILIRUBIN URINE: NEGATIVE
BLOOD, URINE: ABNORMAL
BUN BLDV-MCNC: 11 MG/DL (ref 8–23)
BUN BLDV-MCNC: 12 MG/DL (ref 8–23)
CALCIUM SERPL-MCNC: 9.4 MG/DL (ref 8.6–10.2)
CALCIUM SERPL-MCNC: 9.8 MG/DL (ref 8.6–10.2)
CHLORIDE BLD-SCNC: 100 MMOL/L (ref 98–107)
CHLORIDE BLD-SCNC: 98 MMOL/L (ref 98–107)
CLARITY: CLEAR
CO2: 27 MMOL/L (ref 22–29)
CO2: 29 MMOL/L (ref 22–29)
COLOR: YELLOW
CREAT SERPL-MCNC: 1 MG/DL (ref 0.5–1)
CREAT SERPL-MCNC: 1.1 MG/DL (ref 0.5–1)
EOSINOPHILS ABSOLUTE: 0.13 E9/L (ref 0.05–0.5)
EOSINOPHILS ABSOLUTE: 0.13 E9/L (ref 0.05–0.5)
EOSINOPHILS RELATIVE PERCENT: 1.2 % (ref 0–6)
EOSINOPHILS RELATIVE PERCENT: 1.2 % (ref 0–6)
GFR AFRICAN AMERICAN: 59
GFR AFRICAN AMERICAN: >60
GFR NON-AFRICAN AMERICAN: 49 ML/MIN/1.73
GFR NON-AFRICAN AMERICAN: 54 ML/MIN/1.73
GLUCOSE BLD-MCNC: 134 MG/DL (ref 74–99)
GLUCOSE BLD-MCNC: 176 MG/DL (ref 74–99)
GLUCOSE URINE: NEGATIVE MG/DL
HCT VFR BLD CALC: 39.7 % (ref 34–48)
HCT VFR BLD CALC: 41.6 % (ref 34–48)
HEMOGLOBIN: 12.5 G/DL (ref 11.5–15.5)
HEMOGLOBIN: 13.7 G/DL (ref 11.5–15.5)
IMMATURE GRANULOCYTES #: 0.03 E9/L
IMMATURE GRANULOCYTES #: 0.06 E9/L
IMMATURE GRANULOCYTES %: 0.3 % (ref 0–5)
IMMATURE GRANULOCYTES %: 0.6 % (ref 0–5)
KETONES, URINE: NEGATIVE MG/DL
LACTIC ACID: 1.9 MMOL/L (ref 0.5–2.2)
LEUKOCYTE ESTERASE, URINE: ABNORMAL
LIPASE: 44 U/L (ref 13–60)
LYMPHOCYTES ABSOLUTE: 1.14 E9/L (ref 1.5–4)
LYMPHOCYTES ABSOLUTE: 1.52 E9/L (ref 1.5–4)
LYMPHOCYTES RELATIVE PERCENT: 10.7 % (ref 20–42)
LYMPHOCYTES RELATIVE PERCENT: 14.4 % (ref 20–42)
MCH RBC QN AUTO: 29.3 PG (ref 26–35)
MCH RBC QN AUTO: 30.3 PG (ref 26–35)
MCHC RBC AUTO-ENTMCNC: 31.5 % (ref 32–34.5)
MCHC RBC AUTO-ENTMCNC: 32.9 % (ref 32–34.5)
MCV RBC AUTO: 92 FL (ref 80–99.9)
MCV RBC AUTO: 93 FL (ref 80–99.9)
MONOCYTES ABSOLUTE: 1.33 E9/L (ref 0.1–0.95)
MONOCYTES ABSOLUTE: 1.44 E9/L (ref 0.1–0.95)
MONOCYTES RELATIVE PERCENT: 12.5 % (ref 2–12)
MONOCYTES RELATIVE PERCENT: 13.6 % (ref 2–12)
NEUTROPHILS ABSOLUTE: 7.36 E9/L (ref 1.8–7.3)
NEUTROPHILS ABSOLUTE: 8 E9/L (ref 1.8–7.3)
NEUTROPHILS RELATIVE PERCENT: 69.8 % (ref 43–80)
NEUTROPHILS RELATIVE PERCENT: 74.8 % (ref 43–80)
NITRITE, URINE: NEGATIVE
PDW BLD-RTO: 13.4 FL (ref 11.5–15)
PDW BLD-RTO: 13.4 FL (ref 11.5–15)
PH UA: 5.5 (ref 5–9)
PLATELET # BLD: 247 E9/L (ref 130–450)
PLATELET # BLD: 274 E9/L (ref 130–450)
PMV BLD AUTO: 9.4 FL (ref 7–12)
PMV BLD AUTO: 9.6 FL (ref 7–12)
POTASSIUM REFLEX MAGNESIUM: 3.7 MMOL/L (ref 3.5–5)
POTASSIUM SERPL-SCNC: 3.8 MMOL/L (ref 3.5–5)
PROTEIN UA: NEGATIVE MG/DL
RBC # BLD: 4.27 E12/L (ref 3.5–5.5)
RBC # BLD: 4.52 E12/L (ref 3.5–5.5)
RBC UA: NORMAL /HPF (ref 0–2)
SODIUM BLD-SCNC: 139 MMOL/L (ref 132–146)
SODIUM BLD-SCNC: 140 MMOL/L (ref 132–146)
SPECIFIC GRAVITY UA: 1.01 (ref 1–1.03)
TOTAL PROTEIN: 7.7 G/DL (ref 6.4–8.3)
UROBILINOGEN, URINE: 0.2 E.U./DL
WBC # BLD: 10.6 E9/L (ref 4.5–11.5)
WBC # BLD: 10.7 E9/L (ref 4.5–11.5)
WBC UA: NORMAL /HPF (ref 0–5)

## 2020-07-23 PROCEDURE — 36415 COLL VENOUS BLD VENIPUNCTURE: CPT

## 2020-07-23 PROCEDURE — 83690 ASSAY OF LIPASE: CPT

## 2020-07-23 PROCEDURE — 3600000014 HC SURGERY LEVEL 4 ADDTL 15MIN: Performed by: SURGERY

## 2020-07-23 PROCEDURE — 44346 REVISION OF COLOSTOMY: CPT | Performed by: SURGERY

## 2020-07-23 PROCEDURE — 2580000003 HC RX 258: Performed by: SURGERY

## 2020-07-23 PROCEDURE — 6360000002 HC RX W HCPCS: Performed by: ANESTHESIOLOGIST ASSISTANT

## 2020-07-23 PROCEDURE — 6360000002 HC RX W HCPCS: Performed by: ANESTHESIOLOGY

## 2020-07-23 PROCEDURE — 6360000002 HC RX W HCPCS: Performed by: EMERGENCY MEDICINE

## 2020-07-23 PROCEDURE — 80048 BASIC METABOLIC PNL TOTAL CA: CPT

## 2020-07-23 PROCEDURE — 6360000002 HC RX W HCPCS: Performed by: SURGERY

## 2020-07-23 PROCEDURE — 85025 COMPLETE CBC W/AUTO DIFF WBC: CPT

## 2020-07-23 PROCEDURE — 7100000000 HC PACU RECOVERY - FIRST 15 MIN: Performed by: SURGERY

## 2020-07-23 PROCEDURE — 3700000000 HC ANESTHESIA ATTENDED CARE: Performed by: SURGERY

## 2020-07-23 PROCEDURE — 3600000004 HC SURGERY LEVEL 4 BASE: Performed by: SURGERY

## 2020-07-23 PROCEDURE — 80053 COMPREHEN METABOLIC PANEL: CPT

## 2020-07-23 PROCEDURE — 6360000004 HC RX CONTRAST MEDICATION: Performed by: RADIOLOGY

## 2020-07-23 PROCEDURE — 6360000002 HC RX W HCPCS: Performed by: STUDENT IN AN ORGANIZED HEALTH CARE EDUCATION/TRAINING PROGRAM

## 2020-07-23 PROCEDURE — 88302 TISSUE EXAM BY PATHOLOGIST: CPT

## 2020-07-23 PROCEDURE — 74177 CT ABD & PELVIS W/CONTRAST: CPT

## 2020-07-23 PROCEDURE — 3700000001 HC ADD 15 MINUTES (ANESTHESIA): Performed by: SURGERY

## 2020-07-23 PROCEDURE — 83605 ASSAY OF LACTIC ACID: CPT

## 2020-07-23 PROCEDURE — 2500000003 HC RX 250 WO HCPCS: Performed by: SURGERY

## 2020-07-23 PROCEDURE — 96376 TX/PRO/DX INJ SAME DRUG ADON: CPT

## 2020-07-23 PROCEDURE — 2720000010 HC SURG SUPPLY STERILE: Performed by: SURGERY

## 2020-07-23 PROCEDURE — G0378 HOSPITAL OBSERVATION PER HR: HCPCS

## 2020-07-23 PROCEDURE — 96374 THER/PROPH/DIAG INJ IV PUSH: CPT

## 2020-07-23 PROCEDURE — 0WPF0JZ REMOVAL OF SYNTHETIC SUBSTITUTE FROM ABDOMINAL WALL, OPEN APPROACH: ICD-10-PCS | Performed by: SURGERY

## 2020-07-23 PROCEDURE — 0DBB0ZZ EXCISION OF ILEUM, OPEN APPROACH: ICD-10-PCS | Performed by: SURGERY

## 2020-07-23 PROCEDURE — 2500000003 HC RX 250 WO HCPCS: Performed by: ANESTHESIOLOGIST ASSISTANT

## 2020-07-23 PROCEDURE — 81001 URINALYSIS AUTO W/SCOPE: CPT

## 2020-07-23 PROCEDURE — 2709999900 HC NON-CHARGEABLE SUPPLY: Performed by: SURGERY

## 2020-07-23 PROCEDURE — 99222 1ST HOSP IP/OBS MODERATE 55: CPT | Performed by: SURGERY

## 2020-07-23 PROCEDURE — 2580000003 HC RX 258: Performed by: ANESTHESIOLOGIST ASSISTANT

## 2020-07-23 PROCEDURE — C9113 INJ PANTOPRAZOLE SODIUM, VIA: HCPCS | Performed by: SURGERY

## 2020-07-23 PROCEDURE — 96375 TX/PRO/DX INJ NEW DRUG ADDON: CPT

## 2020-07-23 PROCEDURE — 7100000001 HC PACU RECOVERY - ADDTL 15 MIN: Performed by: SURGERY

## 2020-07-23 RX ORDER — FENTANYL CITRATE 50 UG/ML
25 INJECTION, SOLUTION INTRAMUSCULAR; INTRAVENOUS EVERY 5 MIN PRN
Status: DISCONTINUED | OUTPATIENT
Start: 2020-07-23 | End: 2020-07-23 | Stop reason: HOSPADM

## 2020-07-23 RX ORDER — FENTANYL CITRATE 50 UG/ML
INJECTION, SOLUTION INTRAMUSCULAR; INTRAVENOUS PRN
Status: DISCONTINUED | OUTPATIENT
Start: 2020-07-23 | End: 2020-07-23 | Stop reason: SDUPTHER

## 2020-07-23 RX ORDER — SODIUM CHLORIDE 0.9 % (FLUSH) 0.9 %
10 SYRINGE (ML) INJECTION PRN
Status: DISCONTINUED | OUTPATIENT
Start: 2020-07-23 | End: 2020-07-26 | Stop reason: HOSPADM

## 2020-07-23 RX ORDER — MORPHINE SULFATE 5 MG/ML
5 INJECTION, SOLUTION INTRAMUSCULAR; INTRAVENOUS ONCE
Status: COMPLETED | OUTPATIENT
Start: 2020-07-23 | End: 2020-07-23

## 2020-07-23 RX ORDER — PANTOPRAZOLE SODIUM 40 MG/10ML
40 INJECTION, POWDER, LYOPHILIZED, FOR SOLUTION INTRAVENOUS DAILY
Status: DISCONTINUED | OUTPATIENT
Start: 2020-07-23 | End: 2020-07-26 | Stop reason: HOSPADM

## 2020-07-23 RX ORDER — MORPHINE SULFATE 2 MG/ML
2 INJECTION, SOLUTION INTRAMUSCULAR; INTRAVENOUS EVERY 4 HOURS PRN
Status: DISCONTINUED | OUTPATIENT
Start: 2020-07-23 | End: 2020-07-26 | Stop reason: HOSPADM

## 2020-07-23 RX ORDER — ONDANSETRON 2 MG/ML
4 INJECTION INTRAMUSCULAR; INTRAVENOUS ONCE
Status: COMPLETED | OUTPATIENT
Start: 2020-07-23 | End: 2020-07-23

## 2020-07-23 RX ORDER — ONDANSETRON 2 MG/ML
INJECTION INTRAMUSCULAR; INTRAVENOUS PRN
Status: DISCONTINUED | OUTPATIENT
Start: 2020-07-23 | End: 2020-07-23 | Stop reason: SDUPTHER

## 2020-07-23 RX ORDER — METOPROLOL TARTRATE 5 MG/5ML
INJECTION INTRAVENOUS PRN
Status: DISCONTINUED | OUTPATIENT
Start: 2020-07-23 | End: 2020-07-23 | Stop reason: SDUPTHER

## 2020-07-23 RX ORDER — ACETAMINOPHEN 325 MG/1
650 TABLET ORAL EVERY 4 HOURS PRN
Status: DISCONTINUED | OUTPATIENT
Start: 2020-07-23 | End: 2020-07-26 | Stop reason: HOSPADM

## 2020-07-23 RX ORDER — PROPOFOL 10 MG/ML
INJECTION, EMULSION INTRAVENOUS PRN
Status: DISCONTINUED | OUTPATIENT
Start: 2020-07-23 | End: 2020-07-23 | Stop reason: SDUPTHER

## 2020-07-23 RX ORDER — MEPERIDINE HYDROCHLORIDE 25 MG/ML
12.5 INJECTION INTRAMUSCULAR; INTRAVENOUS; SUBCUTANEOUS EVERY 5 MIN PRN
Status: DISCONTINUED | OUTPATIENT
Start: 2020-07-23 | End: 2020-07-23 | Stop reason: HOSPADM

## 2020-07-23 RX ORDER — NEOSTIGMINE METHYLSULFATE 1 MG/ML
INJECTION, SOLUTION INTRAVENOUS PRN
Status: DISCONTINUED | OUTPATIENT
Start: 2020-07-23 | End: 2020-07-23 | Stop reason: SDUPTHER

## 2020-07-23 RX ORDER — FENTANYL CITRATE 50 UG/ML
50 INJECTION, SOLUTION INTRAMUSCULAR; INTRAVENOUS EVERY 5 MIN PRN
Status: DISCONTINUED | OUTPATIENT
Start: 2020-07-23 | End: 2020-07-23 | Stop reason: HOSPADM

## 2020-07-23 RX ORDER — KETOROLAC TROMETHAMINE 15 MG/ML
15 INJECTION, SOLUTION INTRAMUSCULAR; INTRAVENOUS EVERY 6 HOURS PRN
Status: DISCONTINUED | OUTPATIENT
Start: 2020-07-23 | End: 2020-07-26 | Stop reason: HOSPADM

## 2020-07-23 RX ORDER — OXYCODONE HYDROCHLORIDE AND ACETAMINOPHEN 5; 325 MG/1; MG/1
1 TABLET ORAL EVERY 4 HOURS PRN
Status: DISCONTINUED | OUTPATIENT
Start: 2020-07-23 | End: 2020-07-26 | Stop reason: HOSPADM

## 2020-07-23 RX ORDER — SODIUM CHLORIDE 0.9 % (FLUSH) 0.9 %
10 SYRINGE (ML) INJECTION EVERY 12 HOURS SCHEDULED
Status: DISCONTINUED | OUTPATIENT
Start: 2020-07-23 | End: 2020-07-26 | Stop reason: HOSPADM

## 2020-07-23 RX ORDER — ROCURONIUM BROMIDE 10 MG/ML
INJECTION, SOLUTION INTRAVENOUS PRN
Status: DISCONTINUED | OUTPATIENT
Start: 2020-07-23 | End: 2020-07-23 | Stop reason: SDUPTHER

## 2020-07-23 RX ORDER — BUPIVACAINE HYDROCHLORIDE AND EPINEPHRINE 2.5; 5 MG/ML; UG/ML
INJECTION, SOLUTION EPIDURAL; INFILTRATION; INTRACAUDAL; PERINEURAL PRN
Status: DISCONTINUED | OUTPATIENT
Start: 2020-07-23 | End: 2020-07-23 | Stop reason: HOSPADM

## 2020-07-23 RX ORDER — ONDANSETRON 2 MG/ML
4 INJECTION INTRAMUSCULAR; INTRAVENOUS EVERY 6 HOURS PRN
Status: DISCONTINUED | OUTPATIENT
Start: 2020-07-23 | End: 2020-07-24 | Stop reason: CLARIF

## 2020-07-23 RX ORDER — SODIUM CHLORIDE 9 MG/ML
INJECTION, SOLUTION INTRAVENOUS CONTINUOUS PRN
Status: DISCONTINUED | OUTPATIENT
Start: 2020-07-23 | End: 2020-07-23 | Stop reason: SDUPTHER

## 2020-07-23 RX ORDER — FENTANYL CITRATE 50 UG/ML
INJECTION, SOLUTION INTRAMUSCULAR; INTRAVENOUS
Status: DISPENSED
Start: 2020-07-23 | End: 2020-07-24

## 2020-07-23 RX ORDER — ONDANSETRON 2 MG/ML
4 INJECTION INTRAMUSCULAR; INTRAVENOUS
Status: DISCONTINUED | OUTPATIENT
Start: 2020-07-23 | End: 2020-07-23 | Stop reason: HOSPADM

## 2020-07-23 RX ORDER — LIDOCAINE HYDROCHLORIDE 20 MG/ML
INJECTION, SOLUTION INTRAVENOUS PRN
Status: DISCONTINUED | OUTPATIENT
Start: 2020-07-23 | End: 2020-07-23 | Stop reason: SDUPTHER

## 2020-07-23 RX ORDER — SODIUM CHLORIDE 9 MG/ML
10 INJECTION INTRAVENOUS DAILY
Status: DISCONTINUED | OUTPATIENT
Start: 2020-07-23 | End: 2020-07-26 | Stop reason: HOSPADM

## 2020-07-23 RX ORDER — SODIUM CHLORIDE, SODIUM LACTATE, POTASSIUM CHLORIDE, CALCIUM CHLORIDE 600; 310; 30; 20 MG/100ML; MG/100ML; MG/100ML; MG/100ML
INJECTION, SOLUTION INTRAVENOUS CONTINUOUS
Status: DISCONTINUED | OUTPATIENT
Start: 2020-07-23 | End: 2020-07-24

## 2020-07-23 RX ORDER — DEXAMETHASONE SODIUM PHOSPHATE 10 MG/ML
INJECTION, SOLUTION INTRAMUSCULAR; INTRAVENOUS PRN
Status: DISCONTINUED | OUTPATIENT
Start: 2020-07-23 | End: 2020-07-23 | Stop reason: SDUPTHER

## 2020-07-23 RX ORDER — MIDAZOLAM HYDROCHLORIDE 1 MG/ML
INJECTION INTRAMUSCULAR; INTRAVENOUS PRN
Status: DISCONTINUED | OUTPATIENT
Start: 2020-07-23 | End: 2020-07-23 | Stop reason: SDUPTHER

## 2020-07-23 RX ORDER — CEFAZOLIN SODIUM 2 G/50ML
2 SOLUTION INTRAVENOUS
Status: COMPLETED | OUTPATIENT
Start: 2020-07-23 | End: 2020-07-23

## 2020-07-23 RX ORDER — MORPHINE SULFATE 4 MG/ML
4 INJECTION, SOLUTION INTRAMUSCULAR; INTRAVENOUS EVERY 4 HOURS PRN
Status: DISCONTINUED | OUTPATIENT
Start: 2020-07-23 | End: 2020-07-26 | Stop reason: HOSPADM

## 2020-07-23 RX ORDER — GLYCOPYRROLATE 1 MG/5 ML
SYRINGE (ML) INTRAVENOUS PRN
Status: DISCONTINUED | OUTPATIENT
Start: 2020-07-23 | End: 2020-07-23 | Stop reason: SDUPTHER

## 2020-07-23 RX ADMIN — MORPHINE SULFATE 4 MG: 4 INJECTION, SOLUTION INTRAMUSCULAR; INTRAVENOUS at 12:03

## 2020-07-23 RX ADMIN — SODIUM CHLORIDE: 9 INJECTION, SOLUTION INTRAVENOUS at 15:11

## 2020-07-23 RX ADMIN — SODIUM CHLORIDE, POTASSIUM CHLORIDE, SODIUM LACTATE AND CALCIUM CHLORIDE: 600; 310; 30; 20 INJECTION, SOLUTION INTRAVENOUS at 06:23

## 2020-07-23 RX ADMIN — Medication 0.2 MG: at 15:15

## 2020-07-23 RX ADMIN — MORPHINE SULFATE 5 MG: 5 INJECTION, SOLUTION INTRAMUSCULAR; INTRAVENOUS at 00:41

## 2020-07-23 RX ADMIN — ONDANSETRON 4 MG: 2 INJECTION INTRAMUSCULAR; INTRAVENOUS at 00:41

## 2020-07-23 RX ADMIN — ROCURONIUM BROMIDE 10 MG: 10 SOLUTION INTRAVENOUS at 15:36

## 2020-07-23 RX ADMIN — LIDOCAINE HYDROCHLORIDE 40 MG: 20 INJECTION, SOLUTION INTRAVENOUS at 15:06

## 2020-07-23 RX ADMIN — METOPROLOL TARTRATE 2.5 MG: 5 INJECTION, SOLUTION INTRAVENOUS at 16:20

## 2020-07-23 RX ADMIN — KETOROLAC TROMETHAMINE 15 MG: 15 INJECTION, SOLUTION INTRAMUSCULAR; INTRAVENOUS at 18:49

## 2020-07-23 RX ADMIN — ONDANSETRON 4 MG: 2 INJECTION INTRAMUSCULAR; INTRAVENOUS at 16:23

## 2020-07-23 RX ADMIN — SODIUM CHLORIDE, PRESERVATIVE FREE 10 ML: 5 INJECTION INTRAVENOUS at 08:58

## 2020-07-23 RX ADMIN — MIDAZOLAM 2 MG: 1 INJECTION INTRAMUSCULAR; INTRAVENOUS at 15:06

## 2020-07-23 RX ADMIN — Medication 4 MG: at 16:23

## 2020-07-23 RX ADMIN — FENTANYL CITRATE 50 MCG: 50 INJECTION, SOLUTION INTRAMUSCULAR; INTRAVENOUS at 17:08

## 2020-07-23 RX ADMIN — FENTANYL CITRATE 50 MCG: 50 INJECTION, SOLUTION INTRAMUSCULAR; INTRAVENOUS at 17:19

## 2020-07-23 RX ADMIN — Medication 0.8 MG: at 16:23

## 2020-07-23 RX ADMIN — SODIUM CHLORIDE, PRESERVATIVE FREE 10 ML: 5 INJECTION INTRAVENOUS at 08:59

## 2020-07-23 RX ADMIN — FENTANYL CITRATE 50 MCG: 50 INJECTION, SOLUTION INTRAMUSCULAR; INTRAVENOUS at 15:31

## 2020-07-23 RX ADMIN — CEFAZOLIN SODIUM 2 G: 2 SOLUTION INTRAVENOUS at 15:10

## 2020-07-23 RX ADMIN — ROCURONIUM BROMIDE 40 MG: 10 SOLUTION INTRAVENOUS at 15:07

## 2020-07-23 RX ADMIN — Medication 10 ML: at 21:36

## 2020-07-23 RX ADMIN — FENTANYL CITRATE 100 MCG: 50 INJECTION, SOLUTION INTRAMUSCULAR; INTRAVENOUS at 15:06

## 2020-07-23 RX ADMIN — FENTANYL CITRATE 25 MCG: 50 INJECTION, SOLUTION INTRAMUSCULAR; INTRAVENOUS at 17:43

## 2020-07-23 RX ADMIN — PROPOFOL 20 MG: 10 INJECTION, EMULSION INTRAVENOUS at 16:18

## 2020-07-23 RX ADMIN — MORPHINE SULFATE 5 MG: 5 INJECTION, SOLUTION INTRAMUSCULAR; INTRAVENOUS at 02:12

## 2020-07-23 RX ADMIN — PROPOFOL 170 MG: 10 INJECTION, EMULSION INTRAVENOUS at 15:06

## 2020-07-23 RX ADMIN — IOPAMIDOL 80 ML: 755 INJECTION, SOLUTION INTRAVENOUS at 02:53

## 2020-07-23 RX ADMIN — FENTANYL CITRATE 50 MCG: 50 INJECTION, SOLUTION INTRAMUSCULAR; INTRAVENOUS at 15:37

## 2020-07-23 RX ADMIN — MORPHINE SULFATE 4 MG: 4 INJECTION, SOLUTION INTRAMUSCULAR; INTRAVENOUS at 06:20

## 2020-07-23 RX ADMIN — FENTANYL CITRATE 50 MCG: 50 INJECTION, SOLUTION INTRAMUSCULAR; INTRAVENOUS at 17:28

## 2020-07-23 RX ADMIN — Medication 10 ML: at 09:00

## 2020-07-23 RX ADMIN — FENTANYL CITRATE 25 MCG: 50 INJECTION, SOLUTION INTRAMUSCULAR; INTRAVENOUS at 17:56

## 2020-07-23 RX ADMIN — FENTANYL CITRATE 50 MCG: 50 INJECTION, SOLUTION INTRAMUSCULAR; INTRAVENOUS at 15:41

## 2020-07-23 RX ADMIN — DEXAMETHASONE SODIUM PHOSPHATE 4 MG: 10 INJECTION, SOLUTION INTRAMUSCULAR; INTRAVENOUS at 15:06

## 2020-07-23 RX ADMIN — SODIUM CHLORIDE, PRESERVATIVE FREE 10 ML: 5 INJECTION INTRAVENOUS at 12:04

## 2020-07-23 RX ADMIN — PANTOPRAZOLE SODIUM 40 MG: 40 INJECTION, POWDER, FOR SOLUTION INTRAVENOUS at 08:56

## 2020-07-23 ASSESSMENT — PAIN DESCRIPTION - DESCRIPTORS
DESCRIPTORS: CONSTANT;DISCOMFORT;DULL
DESCRIPTORS: CONSTANT;DISCOMFORT;DULL
DESCRIPTORS: CONSTANT;DISCOMFORT;SHARP
DESCRIPTORS: CONSTANT;SHARP
DESCRIPTORS: CONSTANT;SHARP
DESCRIPTORS: ACHING;DISCOMFORT
DESCRIPTORS: ACHING;DISCOMFORT;SHOOTING
DESCRIPTORS: CONSTANT;SHARP;STABBING
DESCRIPTORS: ACHING;DISCOMFORT;SHARP
DESCRIPTORS: CONSTANT;DULL

## 2020-07-23 ASSESSMENT — PULMONARY FUNCTION TESTS
PIF_VALUE: 3
PIF_VALUE: 25
PIF_VALUE: 1
PIF_VALUE: 27
PIF_VALUE: 30
PIF_VALUE: 0
PIF_VALUE: 27
PIF_VALUE: 27
PIF_VALUE: 28
PIF_VALUE: 1
PIF_VALUE: 29
PIF_VALUE: 29
PIF_VALUE: 17
PIF_VALUE: 25
PIF_VALUE: 26
PIF_VALUE: 28
PIF_VALUE: 27
PIF_VALUE: 26
PIF_VALUE: 28
PIF_VALUE: 3
PIF_VALUE: 27
PIF_VALUE: 28
PIF_VALUE: 26
PIF_VALUE: 35
PIF_VALUE: 17
PIF_VALUE: 28
PIF_VALUE: 1
PIF_VALUE: 8
PIF_VALUE: 34
PIF_VALUE: 26
PIF_VALUE: 34
PIF_VALUE: 29
PIF_VALUE: 26
PIF_VALUE: 3
PIF_VALUE: 32
PIF_VALUE: 0
PIF_VALUE: 27
PIF_VALUE: 35
PIF_VALUE: 29
PIF_VALUE: 22
PIF_VALUE: 25
PIF_VALUE: 27
PIF_VALUE: 28
PIF_VALUE: 27
PIF_VALUE: 26
PIF_VALUE: 30
PIF_VALUE: 31
PIF_VALUE: 29
PIF_VALUE: 28
PIF_VALUE: 27
PIF_VALUE: 35
PIF_VALUE: 24
PIF_VALUE: 29
PIF_VALUE: 28
PIF_VALUE: 0
PIF_VALUE: 35
PIF_VALUE: 27
PIF_VALUE: 24
PIF_VALUE: 23
PIF_VALUE: 34
PIF_VALUE: 25
PIF_VALUE: 3
PIF_VALUE: 35
PIF_VALUE: 28
PIF_VALUE: 27
PIF_VALUE: 28
PIF_VALUE: 35
PIF_VALUE: 26
PIF_VALUE: 28
PIF_VALUE: 28
PIF_VALUE: 27
PIF_VALUE: 36
PIF_VALUE: 27
PIF_VALUE: 29
PIF_VALUE: 16
PIF_VALUE: 30
PIF_VALUE: 25
PIF_VALUE: 36
PIF_VALUE: 35
PIF_VALUE: 26
PIF_VALUE: 8
PIF_VALUE: 28
PIF_VALUE: 29
PIF_VALUE: 29
PIF_VALUE: 28
PIF_VALUE: 2
PIF_VALUE: 35
PIF_VALUE: 18
PIF_VALUE: 26
PIF_VALUE: 27
PIF_VALUE: 29
PIF_VALUE: 27
PIF_VALUE: 4
PIF_VALUE: 25

## 2020-07-23 ASSESSMENT — PAIN DESCRIPTION - FREQUENCY
FREQUENCY: CONTINUOUS
FREQUENCY: INTERMITTENT
FREQUENCY: CONTINUOUS

## 2020-07-23 ASSESSMENT — PAIN - FUNCTIONAL ASSESSMENT
PAIN_FUNCTIONAL_ASSESSMENT: PREVENTS OR INTERFERES SOME ACTIVE ACTIVITIES AND ADLS
PAIN_FUNCTIONAL_ASSESSMENT: PREVENTS OR INTERFERES WITH ALL ACTIVE AND SOME PASSIVE ACTIVITIES
PAIN_FUNCTIONAL_ASSESSMENT: INTOLERABLE, UNABLE TO DO ANY ACTIVE OR PASSIVE ACTIVITIES
PAIN_FUNCTIONAL_ASSESSMENT: ACTIVITIES ARE NOT PREVENTED
PAIN_FUNCTIONAL_ASSESSMENT: PREVENTS OR INTERFERES SOME ACTIVE ACTIVITIES AND ADLS

## 2020-07-23 ASSESSMENT — PAIN SCALES - GENERAL
PAINLEVEL_OUTOF10: 10
PAINLEVEL_OUTOF10: 6
PAINLEVEL_OUTOF10: 0
PAINLEVEL_OUTOF10: 8
PAINLEVEL_OUTOF10: 8
PAINLEVEL_OUTOF10: 9
PAINLEVEL_OUTOF10: 10
PAINLEVEL_OUTOF10: 7
PAINLEVEL_OUTOF10: 0
PAINLEVEL_OUTOF10: 6
PAINLEVEL_OUTOF10: 6
PAINLEVEL_OUTOF10: 10
PAINLEVEL_OUTOF10: 8
PAINLEVEL_OUTOF10: 7
PAINLEVEL_OUTOF10: 2
PAINLEVEL_OUTOF10: 4
PAINLEVEL_OUTOF10: 3
PAINLEVEL_OUTOF10: 9

## 2020-07-23 ASSESSMENT — PAIN DESCRIPTION - PROGRESSION
CLINICAL_PROGRESSION: GRADUALLY IMPROVING
CLINICAL_PROGRESSION: GRADUALLY IMPROVING
CLINICAL_PROGRESSION: GRADUALLY WORSENING
CLINICAL_PROGRESSION: GRADUALLY IMPROVING
CLINICAL_PROGRESSION: GRADUALLY WORSENING
CLINICAL_PROGRESSION: GRADUALLY IMPROVING

## 2020-07-23 ASSESSMENT — PAIN DESCRIPTION - ORIENTATION
ORIENTATION: RIGHT;ANTERIOR;MID
ORIENTATION: RIGHT;ANTERIOR;MID
ORIENTATION: RIGHT;LEFT;UPPER;LOWER
ORIENTATION: RIGHT;ANTERIOR;MID
ORIENTATION: RIGHT
ORIENTATION: RIGHT;MID;UPPER
ORIENTATION: RIGHT;LEFT;UPPER
ORIENTATION: RIGHT;ANTERIOR;MID
ORIENTATION: RIGHT;LEFT;UPPER;LOWER
ORIENTATION: RIGHT;ANTERIOR;MID
ORIENTATION: RIGHT;LEFT;UPPER

## 2020-07-23 ASSESSMENT — PAIN DESCRIPTION - ONSET
ONSET: ON-GOING
ONSET: ON-GOING
ONSET: GRADUAL
ONSET: GRADUAL
ONSET: ON-GOING
ONSET: SUDDEN
ONSET: ON-GOING
ONSET: ON-GOING

## 2020-07-23 ASSESSMENT — PAIN DESCRIPTION - LOCATION
LOCATION: ABDOMEN

## 2020-07-23 ASSESSMENT — ENCOUNTER SYMPTOMS
SHORTNESS OF BREATH: 0
VOMITING: 0
SINUS PRESSURE: 0
ABDOMINAL PAIN: 1
SORE THROAT: 0
WHEEZING: 0
NAUSEA: 1
DIARRHEA: 0
EYE REDNESS: 0
BACK PAIN: 0
EYE PAIN: 0
COUGH: 0
ABDOMINAL DISTENTION: 0
EYE DISCHARGE: 0

## 2020-07-23 ASSESSMENT — PAIN DESCRIPTION - PAIN TYPE
TYPE: ACUTE PAIN;SURGICAL PAIN
TYPE: ACUTE PAIN
TYPE: SURGICAL PAIN
TYPE: SURGICAL PAIN
TYPE: ACUTE PAIN;SURGICAL PAIN
TYPE: ACUTE PAIN
TYPE: ACUTE PAIN
TYPE: SURGICAL PAIN
TYPE: SURGICAL PAIN
TYPE: ACUTE PAIN
TYPE: SURGICAL PAIN

## 2020-07-23 NOTE — H&P
right TKA    NE COLONOSCOPY FLX DX W/COLLJ SPEC WHEN PFRMD N/A 6/26/2018    COLONOSCOPY performed by Laal Alexander MD at [facility] LAP,SURG,COLECT,TOT,W/PROCTECT,W/ILEOST N/A 7/2/2018    LAPAROSCOPIC POSS OPEN SUBTOTAL COLECTOMY POSS ILEOSTOMY performed by Virginie Xie MD at [address] 1/22/2019    SIGMOIDOSCOPY FLEXIBLE performed by Virginie Xie MD at [address] Left 6/11/2019    LEFT TOTAL KNEE ARTHROPLASTY (LUIS ENRIQUE) performed by Joaquin Terrell DO at [facility]         Allergies   Allergen Reactions    Sulfa Antibiotics Nausea Only    Talwin [Pentazocine] Other (See Comments)     Shaky and sweaty    Tetracyclines & Related Dermatitis    Bactrim [Sulfamethoxazole-Trimethoprim] Rash       The patient has a family history that is negative for severe cardiovascular or respiratory issues, negative for reaction to anesthesia. Time spent reviewing past medical, surgical, social and family history, vitals, nursing assessment and images. No changes from above documented history.     Social History     Socioeconomic History    Marital status:      Spouse name: Not on file    Number of children: Not on file    Years of education: Not on file    Highest education level: Not on file   Occupational History    Not on file   Social Needs    Financial resource strain: Not on file    Food insecurity     Worry: Not on file     Inability: Not on file    Transportation needs     Medical: Not on file     Non-medical: Not on file   Tobacco Use    Smoking status: Never Smoker    Smokeless tobacco: Never Used   Substance and Sexual Activity    Alcohol use: Yes     Comment: rarely     Drug use: No    Sexual activity: Not Currently   Lifestyle    Physical activity     Days per week: Not on file     Minutes per session: Not on file    Stress: Not on file   Relationships    Social connections     Talks on phone: Not wheezing  Heart: Reg rate  Abdomen: soft, tender around stoma, nondistended, stoma pink with minimal bowel sweat, no gas output or stool output  Skin; warm and dry, no cyanosis  Gu: no cva tenderness  Extremities: atraumatic, no focal motor deficits, no open wounds  Psych: No tremor, visual hallucinations      Radiology: I reviewed relevant abdominal imaging from this admission and that available in the EMR including CT abd/pel from 7/22.  My assessment is recurrent parastomal hernia, proximal bowel dilation    Assessment:  Sahara Estrella is a 77 y.o. 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    Paroxysmal atrial fibrillation (HCC)    Arthritis of knee, right    Arthritis of knee, left    Parastomal hernia without obstruction or gangrene    Malfunction of colostomy stoma ([address]    Small bowel obstruction ([address]         Plan:  -NPO  -IVF  -pain/nausea control  -diagnostic laparoscopy, repair of parastomal hernia and revision of end ileostomy  -serial labs        Bernie Muñoz MD  4:51 AM  7/23/2020     General Surgery Consult Note  Tee Strickland MD, MS    Patient's Name/Date of Birth: Sahara Estrella / 1945    Date: July 23, 2020     Surgeon: Sweetie Washington MD    Requesting Physician:     Chief Complaint: sbo    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 I saw and examined the patient and discussed the above HPI with the resident and agree with documented positive and negative findings. Objective:  BP (!) 120/58   Pulse 93   Temp 98.3 °F (36.8 °C) (Oral)   Resp 16   Ht 5' 3\" (1.6 m)   Wt 247 lb (112 kg)   SpO2 96%   Breastfeeding No   BMI 43.75 kg/m²   Labs:  Reviewed by me and relevant abnormalities noted       A complete 10 system review was performed and are otherwise negative unless mentioned in the above HPI. Specific negatives are listed below but may not include all those reviewed.     General ROS: negative obtundation, AMS  ENT ROS: negative rhinorrhea, epistaxis  Allergy and Immunology ROS: negative itchy/watery eyes or nasal congestion  Hematological and Lymphatic ROS: negative spontaneous bleeding or bruising  Endocrine ROS: negative  lethargy, mood swings, palpitations or polydipsia/polyuria  Respiratory ROS: negative sputum changes, stridor, tachypnea or wheezing  Cardiovascular ROS: negative for - loss of consciousness, murmur or orthopnea  Gastrointestinal ROS: negative for - hematochezia or hematemesis  Genito-Urinary ROS: negative for -  genital discharge or hematuria  Musculoskeletal ROS: negative for - focal weakness, gangrene  Psych/Neuro ROS: negative for - visual or auditory hallucinations, suicidal ideation    Physical exam:   BP (!) 120/58   Pulse 93   Temp 98.3 °F (36.8 °C) (Oral)   Resp 16   Ht 5' 3\" (1.6 m)   Wt 247 lb (112 kg)   SpO2 96%   Breastfeeding No   BMI 43.75 kg/m²   General appearance:  NAD, appears stated age  Head: NCAT, PERRLA, EOMI, red conjunctiva  Neck: supple, no masses, trachea midline  Lungs: Equal chest rise bilateral, no retractions, no wheezing  Heart: Reg rate  Abdomen: soft, distended, tender around stoma  Skin; warm and dry, no cyanosis  Gu: no cva tenderness  Extremities: atraumatic, no focal motor deficits, no open wounds  Psych: No tremor, visual hallucinations        Radiology: I reviewed relevant abdominal imaging from this admission and that available in the EMR including CT abd/pel from admission. My assessment is SBO and recurrent parastomal hernia    Assessment/Plan:  Richelle Cruz is a 77 y.o. 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 Reviewed chart, vitals, labs and radiologic studies. I also participated in medical decision making with the resident/NP on the date of service and I agree with all of the pertinent clinical information, assessment and treatment plan. I have reviewed and edited the note above based on my findings during my history, exam, and decision making.        Physician Signature: Electronically signed by Dr. Shanika Hatch  [phone] (p)  7/23/2020  11:16 AM

## 2020-07-23 NOTE — ANESTHESIA POSTPROCEDURE EVALUATION
Department of Anesthesiology  Postprocedure Note    Patient: Priya Todd  MRN: [id number]  YOB: 1945  Date of evaluation: 7/23/2020  Time:  7:30 PM     Procedure Summary     Date:  07/23/20 Room / Location:  [address] [place] [address]    Anesthesia Start:  0255 Anesthesia Stop:  1647    Procedure:  LAPAROSCOPIC REPAIR OF PARASTOMAL HERNIA WITH REVISION OF ILEOSTOMY (N/A Abdomen) Diagnosis:  (SMALL BOWEL OBSTRUCTION)    Surgeon:  Chinyere Monae MD Responsible Provider:  Sophia Limon MD    Anesthesia Type:  general ASA Status:  3          Anesthesia Type: general    Tyrese Phase I: Tyrese Score: 9    Tyrese Phase II:      Last vitals: Reviewed and per EMR flowsheets.        Anesthesia Post Evaluation    Patient location during evaluation: PACU  Patient participation: complete - patient participated  Level of consciousness: awake and alert  Airway patency: patent  Nausea & Vomiting: no vomiting and no nausea  Complications: no  Cardiovascular status: hemodynamically stable  Respiratory status: acceptable  Hydration status: stable

## 2020-07-23 NOTE — ED PROVIDER NOTES
Patient is a 78 y/o female who presents to the ED with abdominal pain. Patient states that she underwent a parastomal hernia repair yesterday. She was doing fine until today when she had onset of diffuse abdominal pain. Currently, her pain is 10/10. She states that she has had no output from the stoma. She is nauseated but denies any vomiting. There has been no fever. Review of Systems   Constitutional: Negative for chills and fever. HENT: Negative for ear pain, sinus pressure and sore throat. Eyes: Negative for pain, discharge and redness. Respiratory: Negative for cough, shortness of breath and wheezing. Cardiovascular: Negative for chest pain. Gastrointestinal: Positive for abdominal pain and nausea. Negative for abdominal distention, diarrhea and vomiting. Genitourinary: Negative for dysuria and frequency. Musculoskeletal: Negative for arthralgias and back pain. Skin: Negative for rash and wound. Neurological: Negative for weakness and headaches. Hematological: Negative for adenopathy. All other systems reviewed and are negative. Physical Exam  Vitals signs and nursing note reviewed. Constitutional:       General: She is not in acute distress. Appearance: She is obese. HENT:      Head: Normocephalic and atraumatic. Right Ear: External ear normal.      Left Ear: External ear normal.      Nose: Nose normal.      Mouth/Throat:      Mouth: Mucous membranes are moist.   Eyes:      Conjunctiva/sclera: Conjunctivae normal.      Pupils: Pupils are equal, round, and reactive to light. Neck:      Musculoskeletal: Normal range of motion and neck supple. Cardiovascular:      Rate and Rhythm: Normal rate and regular rhythm. Heart sounds: No murmur. Pulmonary:      Effort: Pulmonary effort is normal. No respiratory distress. Breath sounds: Normal breath sounds. No stridor. No wheezing, rhonchi or rales.    Abdominal:      General: Bowel sounds are normal. There is no distension. Palpations: Abdomen is soft. Tenderness: There is abdominal tenderness. There is no guarding. Comments: Stoma is pink with drainage. Surgical incisions are intact without erythema or drainage. Musculoskeletal: Normal range of motion. General: No swelling. Skin:     General: Skin is warm and dry. Neurological:      Mental Status: She is alert and oriented to person, place, and time. Procedures     MDM              --------------------------------------------- PAST HISTORY ---------------------------------------------  Past Medical History:  has a past medical history of Arthritis, History of blood transfusion, History of cardiovascular stress test, Hypertension, Sciatica, Spinal headache, Tachycardia, and Thyroid disease. Past Surgical History:  has a past surgical history that includes Tubal ligation; Heel spur surgery; Knee cartilage surgery; Colonoscopy; Endoscopy, colon, diagnostic; other surgical history (Right, 05/09/2017); Colonoscopy (N/A, 6/27/2018); pr colonoscopy flx dx w/collj spec when pfrmd (N/A, 6/26/2018); pr lap,surg,colect,tot,w/proctect,w/ileost (N/A, 7/2/2018); joint replacement (Right); proctosigmoidoscopy (N/A, 1/22/2019); Total knee arthroplasty (Left, 6/11/2019); and hernia repair (N/A, 7/21/2020). Social History:  reports that she has never smoked. She has never used smokeless tobacco. She reports current alcohol use. She reports that she does not use drugs. Family History: family history includes Cancer in her father and mother. The patients home medications have been reviewed. Allergies: Sulfa antibiotics; Talwin [pentazocine];  Tetracyclines & related; and Bactrim [sulfamethoxazole-trimethoprim]    -------------------------------------------------- RESULTS -------------------------------------------------    LABS:  Results for orders placed or performed during the hospital encounter of 07/22/20   Urinalysis Result Value Ref Range    Color, UA Yellow Straw/Yellow    Clarity, UA Clear Clear    Glucose, Ur Negative Negative mg/dL    Bilirubin Urine Negative Negative    Ketones, Urine Negative Negative mg/dL    Specific Gravity, UA 1.010 1.005 - 1.030    Blood, Urine TRACE (A) Negative    pH, UA 5.5 5.0 - 9.0    Protein, UA Negative Negative mg/dL    Urobilinogen, Urine 0.2 <2.0 E.U./dL    Nitrite, Urine Negative Negative    Leukocyte Esterase, Urine TRACE (A) Negative   CBC auto differential   Result Value Ref Range    WBC 10.7 4.5 - 11.5 E9/L    RBC 4.52 3.50 - 5.50 E12/L    Hemoglobin 13.7 11.5 - 15.5 g/dL    Hematocrit 41.6 34.0 - 48.0 %    MCV 92.0 80.0 - 99.9 fL    MCH 30.3 26.0 - 35.0 pg    MCHC 32.9 32.0 - 34.5 %    RDW 13.4 11.5 - 15.0 fL    Platelets 607 320 - 191 E9/L    MPV 9.6 7.0 - 12.0 fL    Neutrophils % 74.8 43.0 - 80.0 %    Immature Granulocytes % 0.3 0.0 - 5.0 %    Lymphocytes % 10.7 (L) 20.0 - 42.0 %    Monocytes % 12.5 (H) 2.0 - 12.0 %    Eosinophils % 1.2 0.0 - 6.0 %    Basophils % 0.5 0.0 - 2.0 %    Neutrophils Absolute 8.00 (H) 1.80 - 7.30 E9/L    Immature Granulocytes # 0.03 E9/L    Lymphocytes Absolute 1.14 (L) 1.50 - 4.00 E9/L    Monocytes Absolute 1.33 (H) 0.10 - 0.95 E9/L    Eosinophils Absolute 0.13 0.05 - 0.50 E9/L    Basophils Absolute 0.05 0.00 - 0.20 E9/L   Comprehensive Metabolic Panel   Result Value Ref Range    Sodium 139 132 - 146 mmol/L    Potassium 3.8 3.5 - 5.0 mmol/L    Chloride 98 98 - 107 mmol/L    CO2 29 22 - 29 mmol/L    Anion Gap 12 7 - 16 mmol/L    Glucose 176 (H) 74 - 99 mg/dL    BUN 12 8 - 23 mg/dL    CREATININE 1.0 0.5 - 1.0 mg/dL    GFR Non-African American 54 >=60 mL/min/1.73    GFR African American >60     Calcium 9.8 8.6 - 10.2 mg/dL    Total Protein 7.7 6.4 - 8.3 g/dL    Alb 3.8 3.5 - 5.2 g/dL    Total Bilirubin 0.4 0.0 - 1.2 mg/dL    Alkaline Phosphatase 73 35 - 104 U/L    ALT 16 0 - 32 U/L    AST 21 0 - 31 U/L   Lipase   Result Value Ref Range    Lipase 44 13 - 60 U/L   Lactic Acid, Plasma   Result Value Ref Range    Lactic Acid 1.9 0.5 - 2.2 mmol/L   Microscopic Urinalysis   Result Value Ref Range    WBC, UA 1-3 0 - 5 /HPF    RBC, UA 0-1 0 - 2 /HPF    Bacteria, UA NONE SEEN None Seen /HPF       RADIOLOGY:  CT ABDOMEN PELVIS W IV CONTRAST Additional Contrast? None   Preliminary Result   1. Patient had recent parastomal hernia repair. Soft tissue gas is noted   within the anterior abdominal wall, likely related to recent surgery. 2. There is recurrence of a parastomal hernia with multiple small bowel loops   within the hernia sac. Dilated proximal intra-abdominal small bowel loops   are noted with air-fluid levels, concerning for small bowel obstruction. A   transition point is noted at the opening of the hernia sac. No evidence of   ischemic changes are seen at this time. Surgical evaluation is recommended. 3. Diffuse fatty infiltration of the liver. 4. Nonobstructing left renal calculi.               ------------------------- NURSING NOTES AND VITALS REVIEWED ---------------------------  Date / Time Roomed:  7/22/2020 11:54 PM  ED Bed Assignment:  10/10    The nursing notes within the ED encounter and vital signs as below have been reviewed. Patient Vitals for the past 24 hrs:   BP Temp Temp src Pulse Resp SpO2 Height Weight   07/23/20 0153 (!) 158/83 98.2 °F (36.8 °C) Oral 63 18 94 % -- --   07/23/20 0006 (!) 151/84 98.4 °F (36.9 °C) Oral 63 18 94 % 5' 3\" (1.6 m) 247 lb (112 kg)   07/22/20 2353 -- 97.2 °F (36.2 °C) Infrared 62 18 96 % 5' 3\" (1.6 m) 247 lb (112 kg)       Oxygen Saturation Interpretation: Normal    ------------------------------------------ PROGRESS NOTES ------------------------------------------  Re-evaluation(s):  Time: 0340.   Patients symptoms are improving  Repeat physical examination is not changed    Counseling:  I have spoken with the patient and discussed todays results, in addition to providing specific details for the plan of care and counseling regarding the diagnosis and prognosis. Their questions are answered at this time and they are agreeable with the plan of admission.    --------------------------------- ADDITIONAL PROVIDER NOTES ---------------------------------  Consultations:  Time: 0346. Spoke with Dr. Karissa Yung. Discussed case. They will admit the patient. This patient's ED course included: a personal history and physicial examination, re-evaluation prior to disposition and IV medications    This patient has remained hemodynamically stable during their ED course. Diagnosis:  1. Small bowel obstruction (HCC)        Disposition:  Patient's disposition: Admit to med/surg floor  Patient's condition is stable.                [facility],   07/23/20 6050

## 2020-07-23 NOTE — ANESTHESIA PRE PROCEDURE
by mouth nightly     Historical Provider, MD       Current medications:    No current facility-administered medications for this visit. No current outpatient medications on file. Facility-Administered Medications Ordered in Other Visits   Medication Dose Route Frequency Provider Last Rate Last Dose    sodium chloride flush 0.9 % injection 10 mL  10 mL Intravenous 2 times per day Yamila Sinclair MD   10 mL at 07/23/20 0858    sodium chloride flush 0.9 % injection 10 mL  10 mL Intravenous PRN Yamila Sinclair MD        acetaminophen (TYLENOL) tablet 650 mg  650 mg Oral Q4H PRN Yamila Sinclair MD        lactated ringers infusion   Intravenous Continuous Yamila Sinclair  mL/hr at 07/23/20 7821      sodium chloride flush 0.9 % injection 10 mL  10 mL Intravenous 2 times per day Yamila Sinclair MD   10 mL at 07/23/20 0900    sodium chloride flush 0.9 % injection 10 mL  10 mL Intravenous PRN Yamila Sinclair MD   10 mL at 07/23/20 1204    morphine (PF) injection 2 mg  2 mg Intravenous Q4H PRN Yamila Sinclair MD        Or    morphine injection 4 mg  4 mg Intravenous Q4H PRN Yamila Sinclair MD   4 mg at 07/23/20 1203    ondansetron (ZOFRAN) injection 4 mg  4 mg Intravenous Q6H PRN Yamila Sinclair MD        pantoprazole (PROTONIX) injection 40 mg  40 mg Intravenous Daily Yamila Sinclair MD   40 mg at 07/23/20 0856    And    sodium chloride (PF) 0.9 % injection 10 mL  10 mL Intravenous Daily Yamila Sinclair MD   10 mL at 07/23/20 0859    enoxaparin (LOVENOX) injection 40 mg  40 mg Subcutaneous Daily Yamila Sinclair MD   Stopped at 07/23/20 0900    ceFAZolin (ANCEF) 2 g in dextrose 3 % 50 mL IVPB (duplex)  2 g Intravenous On Call to [address], [place]           Allergies:     Allergies   Allergen Reactions    Sulfa Antibiotics Nausea Only    Talwin [Pentazocine] Other (See Comments)     Shaky and sweaty    Tetracyclines & Related Dermatitis    Bactrim [Sulfamethoxazole-Trimethoprim] Rash       Problem List:    Patient Active Problem List   Diagnosis Code    Class 2 obesity in adult E66.9    Lower GI bleed K92.2    Diverticulitis of intestine with bleeding K57.93    Anemia due to blood loss, acute D62    Essential hypertension I10    Acquired hypothyroidism E03.9    Osteoarthritis of multiple joints M15.9    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 K57.80    Paroxysmal atrial fibrillation (HCC) I48.0    Arthritis of knee, right M17.11    Arthritis of knee, left M17.12    Parastomal hernia without obstruction or gangrene K43.5    Malfunction of colostomy stoma ([facility] [address] K94.03    Small bowel obstruction ([facility] [address] K56.609       Past Medical History:        Diagnosis Date    Arthritis     History of blood transfusion     History of cardiovascular stress test 6/2015    lexiscan    Hypertension     Sciatica     Right    Spinal headache     Tachycardia     on metoprolol ,paroxysmal, usually at HS    Thyroid disease        Past Surgical History:        Procedure Laterality Date    COLONOSCOPY      COLONOSCOPY N/A 6/27/2018    COLONOSCOPY CONTROL HEMORRHAGE performed by Oumou Paz MD at [address] [facility], [place], DIAGNOSTIC      HEEL SPUR SURGERY      HERNIA REPAIR N/A 7/21/2020    PARASTOMAL HERNIA REPAIR WITH MESH, LAPAROSCOPIC ROBOTIC XI ASSISTED performed by Ziggy Garcia MD at [address] [facility] Right     [facility]      OTHER SURGICAL HISTORY Right 05/09/2017    right TKA    MI COLONOSCOPY FLX DX W/COLLJ SPEC WHEN PFRMD N/A 6/26/2018    COLONOSCOPY performed by Deejay Vargas MD at [address] LAP,SURG,COLECT,TOT,W/PROCTECT,W/ILEOST N/A 7/2/2018    LAPAROSCOPIC POSS OPEN SUBTOTAL COLECTOMY POSS ILEOSTOMY performed by Ziggy Garcia MD at [address]  1/22/2019    SIGMOIDOSCOPY FLEXIBLE performed by Ziggy Garcia MD at [address] [facility] KNEE ARTHROPLASTY Left 6/11/2019    LEFT TOTAL KNEE ARTHROPLASTY (LUIS ENRIQUE) performed by Reagan Hlaey DO at [address]         Social History:    Social History     Tobacco Use    Smoking status: Never Smoker    Smokeless tobacco: Never Used   Substance Use Topics    Alcohol use: Yes     Comment: rarely                                 Counseling given: Not Answered      Vital Signs (Current): There were no vitals filed for this visit. BP Readings from Last 3 Encounters:   07/23/20 (!) 120/58   07/21/20 130/70   07/21/20 (!) 150/98       NPO Status:                                                                                 BMI:   Wt Readings from Last 3 Encounters:   07/23/20 247 lb (112 kg)   07/17/20 247 lb 2 oz (112.1 kg)   06/01/20 247 lb (112 kg)     There is no height or weight on file to calculate BMI.    CBC:   Lab Results   Component Value Date    WBC 10.6 07/23/2020    RBC 4.27 07/23/2020    HGB 12.5 07/23/2020    HCT 39.7 07/23/2020    MCV 93.0 07/23/2020    RDW 13.4 07/23/2020     07/23/2020       CMP:   Lab Results   Component Value Date     07/23/2020    K 3.7 07/23/2020     07/23/2020    CO2 27 07/23/2020    BUN 11 07/23/2020    CREATININE 1.1 07/23/2020    GFRAA 59 07/23/2020    LABGLOM 49 07/23/2020    GLUCOSE 134 07/23/2020    GLUCOSE 87 03/22/2012    PROT 7.7 07/23/2020    CALCIUM 9.4 07/23/2020    BILITOT 0.4 07/23/2020    ALKPHOS 73 07/23/2020    AST 21 07/23/2020    ALT 16 07/23/2020       POC Tests: No results for input(s): POCGLU, POCNA, POCK, POCCL, POCBUN, POCHEMO, POCHCT in the last 72 hours.     Coags:   Lab Results   Component Value Date    PROTIME 11.8 06/04/2019    INR 1.1 06/04/2019    APTT 31.9 06/04/2019       HCG (If Applicable): No results found for: PREGTESTUR, PREGSERUM, HCG, HCGQUANT     ABGs: No results found for: PHART, PO2ART, HSO2HMN, HZP4KBZ, BEART, M3IJPZZZ     Type & Screen (If Applicable):  No results found for: LABABO, [address]    Anesthesia Evaluation  Patient summary reviewed history of anesthetic complications (Spinal headache): Airway: Mallampati: II  TM distance: >3 FB   Neck ROM: full  Mouth opening: > = 3 FB Dental: normal exam         Pulmonary: breath sounds clear to auscultation      (-) COPD and asthma                           Cardiovascular:  Exercise tolerance: good (>4 METS),   (+) hypertension: moderate, dysrhythmias: ventricular tachycardia,     (-) past MI and CAD    ECG reviewed  Rhythm: regular  Rate: normal    Stress test reviewed       Beta Blocker:  Dose within 24 Hrs         Neuro/Psych:   (+) neuromuscular disease:, headaches:,              ROS comment: Spinal Headache from spinal injection more than 10 years ago. 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 2017 GI/Hepatic/Renal:        (-) liver disease and no renal disease      ROS comment: Parastomal hernia repair . Endo/Other:    (+) hypothyroidism: arthritis:., .                 Abdominal:   (+) obese,         Vascular: negative vascular ROS. Anesthesia Plan      general     ASA 3       Induction: intravenous. BIS  MIPS: Postoperative opioids intended and Prophylactic antiemetics administered. Anesthetic plan and risks discussed with patient. Plan discussed with CRNA.             Mikaela Mast MD   7/23/2020

## 2020-07-23 NOTE — PLAN OF CARE
Problem: Pain:  Goal: Pain level will decrease  Description: Pain level will decrease  Outcome: Met This Shift     Problem: Pain:  Goal: Control of acute pain  Description: Control of acute pain  Outcome: Met This Shift     Problem: Pain:  Goal: Control of chronic pain  Description: Control of chronic pain  Outcome: Met This Shift     Problem: Falls - Risk of:  Goal: Will remain free from falls  Description: Will remain free from falls  Outcome: Met This Shift     Problem: Falls - Risk of:  Goal: Absence of physical injury  Description: Absence of physical injury  Outcome: Met This Shift     Problem: Activity:  Goal: Risk for activity intolerance will decrease  Description: Risk for activity intolerance will decrease  Outcome: Met This Shift     Problem: Bowel/Gastric:  Goal: Bowel function will improve  Description: Bowel function will improve  Outcome: Met This Shift     Problem: Bowel/Gastric:  Goal: Diagnostic test results will improve  Description: Diagnostic test results will improve  Outcome: Met This Shift     Problem: Bowel/Gastric:  Goal: Occurrences of nausea will decrease  Description: Occurrences of nausea will decrease  Outcome: Met This Shift     Problem:  Bowel/Gastric:  Goal: Occurrences of vomiting will decrease  Description: Occurrences of vomiting will decrease  Outcome: Met This Shift     Problem: Fluid Volume:  Goal: Maintenance of adequate hydration will improve  Description: Maintenance of adequate hydration will improve  Outcome: Met This Shift     Problem: Health Behavior:  Goal: Ability to state signs and symptoms to report to health care provider will improve  Description: Ability to state signs and symptoms to report to health care provider will improve  Outcome: Met This Shift     Problem: Physical Regulation:  Goal: Complications related to the disease process, condition or treatment will be avoided or minimized  Description: Complications related to the disease process, condition or treatment will be avoided or minimized  Outcome: Met This Shift     Problem: Physical Regulation:  Goal: Ability to maintain clinical measurements within normal limits will improve  Description: Ability to maintain clinical measurements within normal limits will improve  Outcome: Met This Shift     Problem: Sensory:  Goal: Pain level will decrease  Description: Pain level will decrease  Outcome: Met This Shift     Problem: Sensory:  Goal: Ability to identify factors that increase the pain will improve  Description: Ability to identify factors that increase the pain will improve  Outcome: Met This Shift     Problem: Sensory:  Goal: Ability to notify healthcare provider of pain before it becomes unmanageable or unbearable will improve  Description: Ability to notify healthcare provider of pain before it becomes unmanageable or unbearable will improve  Outcome: Met This Shift

## 2020-07-24 PROBLEM — K56.609 SBO (SMALL BOWEL OBSTRUCTION) (HCC): Status: ACTIVE | Noted: 2020-07-24

## 2020-07-24 LAB
METER GLUCOSE: 116 MG/DL (ref 74–99)
METER GLUCOSE: 117 MG/DL (ref 74–99)

## 2020-07-24 PROCEDURE — 1200000000 HC SEMI PRIVATE

## 2020-07-24 PROCEDURE — C9113 INJ PANTOPRAZOLE SODIUM, VIA: HCPCS | Performed by: SURGERY

## 2020-07-24 PROCEDURE — 96376 TX/PRO/DX INJ SAME DRUG ADON: CPT

## 2020-07-24 PROCEDURE — 6370000000 HC RX 637 (ALT 250 FOR IP): Performed by: SURGERY

## 2020-07-24 PROCEDURE — 2580000003 HC RX 258: Performed by: SURGERY

## 2020-07-24 PROCEDURE — 96375 TX/PRO/DX INJ NEW DRUG ADDON: CPT

## 2020-07-24 PROCEDURE — 6370000000 HC RX 637 (ALT 250 FOR IP): Performed by: INTERNAL MEDICINE

## 2020-07-24 PROCEDURE — 6360000002 HC RX W HCPCS: Performed by: SURGERY

## 2020-07-24 PROCEDURE — 82962 GLUCOSE BLOOD TEST: CPT

## 2020-07-24 PROCEDURE — 96372 THER/PROPH/DIAG INJ SC/IM: CPT

## 2020-07-24 RX ORDER — LOSARTAN POTASSIUM AND HYDROCHLOROTHIAZIDE 12.5; 5 MG/1; MG/1
1 TABLET ORAL DAILY
Status: DISCONTINUED | OUTPATIENT
Start: 2020-07-24 | End: 2020-07-24 | Stop reason: CLARIF

## 2020-07-24 RX ORDER — LOSARTAN POTASSIUM 50 MG/1
50 TABLET ORAL DAILY
Status: DISCONTINUED | OUTPATIENT
Start: 2020-07-24 | End: 2020-07-26 | Stop reason: HOSPADM

## 2020-07-24 RX ORDER — PROCHLORPERAZINE EDISYLATE 5 MG/ML
10 INJECTION INTRAMUSCULAR; INTRAVENOUS EVERY 6 HOURS PRN
Status: DISCONTINUED | OUTPATIENT
Start: 2020-07-24 | End: 2020-07-26 | Stop reason: HOSPADM

## 2020-07-24 RX ORDER — HYDROCHLOROTHIAZIDE 12.5 MG/1
12.5 TABLET ORAL DAILY
Status: DISCONTINUED | OUTPATIENT
Start: 2020-07-24 | End: 2020-07-26 | Stop reason: HOSPADM

## 2020-07-24 RX ORDER — VITAMIN B COMPLEX
5000 TABLET ORAL DAILY
Status: DISCONTINUED | OUTPATIENT
Start: 2020-07-24 | End: 2020-07-26 | Stop reason: HOSPADM

## 2020-07-24 RX ORDER — METFORMIN HYDROCHLORIDE 500 MG/1
500 TABLET, EXTENDED RELEASE ORAL 2 TIMES DAILY WITH MEALS
Status: DISCONTINUED | OUTPATIENT
Start: 2020-07-24 | End: 2020-07-26 | Stop reason: HOSPADM

## 2020-07-24 RX ORDER — SOTALOL HYDROCHLORIDE 80 MG/1
120 TABLET ORAL 2 TIMES DAILY
Status: DISCONTINUED | OUTPATIENT
Start: 2020-07-24 | End: 2020-07-26 | Stop reason: HOSPADM

## 2020-07-24 RX ORDER — ASPIRIN 81 MG/1
81 TABLET ORAL DAILY
Status: DISCONTINUED | OUTPATIENT
Start: 2020-07-24 | End: 2020-07-26 | Stop reason: HOSPADM

## 2020-07-24 RX ORDER — LEVOTHYROXINE SODIUM 0.05 MG/1
50 TABLET ORAL NIGHTLY
Status: DISCONTINUED | OUTPATIENT
Start: 2020-07-24 | End: 2020-07-26 | Stop reason: HOSPADM

## 2020-07-24 RX ORDER — ONDANSETRON 2 MG/ML
4 INJECTION INTRAMUSCULAR; INTRAVENOUS EVERY 6 HOURS PRN
Status: DISCONTINUED | OUTPATIENT
Start: 2020-07-24 | End: 2020-07-24 | Stop reason: CLARIF

## 2020-07-24 RX ADMIN — SOTALOL HYDROCHLORIDE 120 MG: 80 TABLET ORAL at 21:19

## 2020-07-24 RX ADMIN — SODIUM CHLORIDE, PRESERVATIVE FREE 10 ML: 5 INJECTION INTRAVENOUS at 18:18

## 2020-07-24 RX ADMIN — SOTALOL HYDROCHLORIDE 120 MG: 80 TABLET ORAL at 13:19

## 2020-07-24 RX ADMIN — PANTOPRAZOLE SODIUM 40 MG: 40 INJECTION, POWDER, FOR SOLUTION INTRAVENOUS at 09:51

## 2020-07-24 RX ADMIN — ENOXAPARIN SODIUM 40 MG: 40 INJECTION SUBCUTANEOUS at 08:56

## 2020-07-24 RX ADMIN — Medication 10 ML: at 09:51

## 2020-07-24 RX ADMIN — LOSARTAN POTASSIUM 50 MG: 50 TABLET, FILM COATED ORAL at 11:21

## 2020-07-24 RX ADMIN — ASPIRIN 81 MG: 81 TABLET, COATED ORAL at 11:21

## 2020-07-24 RX ADMIN — LEVOTHYROXINE SODIUM 50 MCG: 50 TABLET ORAL at 21:18

## 2020-07-24 RX ADMIN — MELATONIN 5000 UNITS: at 11:22

## 2020-07-24 RX ADMIN — KETOROLAC TROMETHAMINE 15 MG: 15 INJECTION, SOLUTION INTRAMUSCULAR; INTRAVENOUS at 05:21

## 2020-07-24 RX ADMIN — SODIUM CHLORIDE, PRESERVATIVE FREE 10 ML: 5 INJECTION INTRAVENOUS at 18:19

## 2020-07-24 RX ADMIN — OXYCODONE HYDROCHLORIDE AND ACETAMINOPHEN 1 TABLET: 5; 325 TABLET ORAL at 15:35

## 2020-07-24 RX ADMIN — METFORMIN HYDROCHLORIDE 500 MG: 500 TABLET, EXTENDED RELEASE ORAL at 18:20

## 2020-07-24 RX ADMIN — HYDROCHLOROTHIAZIDE 12.5 MG: 12.5 TABLET ORAL at 11:21

## 2020-07-24 RX ADMIN — MORPHINE SULFATE 4 MG: 4 INJECTION, SOLUTION INTRAMUSCULAR; INTRAVENOUS at 00:18

## 2020-07-24 RX ADMIN — KETOROLAC TROMETHAMINE 15 MG: 15 INJECTION, SOLUTION INTRAMUSCULAR; INTRAVENOUS at 18:19

## 2020-07-24 RX ADMIN — Medication 10 ML: at 21:19

## 2020-07-24 RX ADMIN — SODIUM CHLORIDE, PRESERVATIVE FREE 10 ML: 5 INJECTION INTRAVENOUS at 09:51

## 2020-07-24 ASSESSMENT — PAIN DESCRIPTION - DESCRIPTORS
DESCRIPTORS: ACHING;DISCOMFORT;SORE;TENDER
DESCRIPTORS: ACHING;DISCOMFORT;SORE
DESCRIPTORS: ACHING;DISCOMFORT;SORE;TENDER

## 2020-07-24 ASSESSMENT — PAIN SCALES - GENERAL
PAINLEVEL_OUTOF10: 6
PAINLEVEL_OUTOF10: 3
PAINLEVEL_OUTOF10: 0
PAINLEVEL_OUTOF10: 3
PAINLEVEL_OUTOF10: 8
PAINLEVEL_OUTOF10: 8
PAINLEVEL_OUTOF10: 4
PAINLEVEL_OUTOF10: 9

## 2020-07-24 ASSESSMENT — PAIN DESCRIPTION - ORIENTATION: ORIENTATION: LOWER

## 2020-07-24 ASSESSMENT — PAIN DESCRIPTION - LOCATION
LOCATION: ABDOMEN

## 2020-07-24 ASSESSMENT — PAIN DESCRIPTION - PAIN TYPE
TYPE: ACUTE PAIN;SURGICAL PAIN

## 2020-07-24 ASSESSMENT — PAIN DESCRIPTION - ONSET
ONSET: ON-GOING

## 2020-07-24 ASSESSMENT — PAIN DESCRIPTION - FREQUENCY
FREQUENCY: CONTINUOUS

## 2020-07-24 ASSESSMENT — PAIN - FUNCTIONAL ASSESSMENT: PAIN_FUNCTIONAL_ASSESSMENT: PREVENTS OR INTERFERES SOME ACTIVE ACTIVITIES AND ADLS

## 2020-07-24 ASSESSMENT — PAIN DESCRIPTION - PROGRESSION
CLINICAL_PROGRESSION: NOT CHANGED

## 2020-07-24 NOTE — CARE COORDINATION
CM note: covid not tested. Pt had surgery 7/23, no anticipated discharge needs at this time. Does have a hx of [facility].

## 2020-07-24 NOTE — CONSULTS
CONSULT    H&P    Patient ID:  Luis Mina  [id number]  11 y.o.  1945     Reason for consult: Medical management. Requesting Physician: Dr. Alfredo Lubin. History Obtained From: Patient. HISTORY OF PRESENT ILLNESS:    The patient is a 77 y.o. female who presents with patient just had repair about ileostomy stoma that was protruding out, started with abdominal pain came to emergency room patient had no output from her ileostomy and had developed hernia around her repaired ileostomy stoma. Patient has previous history of diverticulosis, hypertension, osteoarthritis, hyperlipidemia, sciatica, obesity. history of left knee osteoarthritis post total knee replacement. History of paroxysmal atrial fibrillation. Hypothyroidism.     Past Medical History:        Diagnosis Date    Arthritis     History of blood transfusion     History of cardiovascular stress test 6/2015    lexiscan    Hypertension     Sciatica     Right    Spinal headache     Tachycardia     on metoprolol ,paroxysmal, usually at HS    Thyroid disease        Past Surgical History:        Procedure Laterality Date    COLONOSCOPY      COLONOSCOPY N/A 6/27/2018    COLONOSCOPY CONTROL HEMORRHAGE performed by Anam Gil MD at [address] [facility] [place], DIAGNOSTIC      HEEL SPUR SURGERY      HERNIA REPAIR N/A 7/21/2020    PARASTOMAL HERNIA REPAIR WITH MESH, LAPAROSCOPIC ROBOTIC XI ASSISTED performed by Raudel Cavazos MD at [facility] [address] Right     [address] OTHER SURGICAL HISTORY Right 05/09/2017    right TKA    IA COLONOSCOPY FLX DX W/COLLJ SPEC WHEN PFRMD N/A 6/26/2018    COLONOSCOPY performed by Emma Aranda MD at [address] LAP,SURG,COLECT,TOT,W/PROCTECT,W/ILEOST N/A 7/2/2018    LAPAROSCOPIC POSS OPEN SUBTOTAL COLECTOMY POSS ILEOSTOMY performed by Raudel Cavazos MD at [address] 1/22/2019    SIGMOIDOSCOPY FLEXIBLE performed by Raudel Cavazos MD at [address] [place] [address] Drive Left 6/11/2019    LEFT TOTAL KNEE ARTHROPLASTY (LUIS ENRIQUE) performed by Wu Parks DO at [address]         Medications Prior to Admission:    Medications Prior to Admission: oxyCODONE-acetaminophen (PERCOCET) 5-325 MG per tablet, Take 1 tablet by mouth every 6 hours as needed for Pain for up to 5 days. TURMERIC PO, Take by mouth daily  NONFORMULARY, Natural supplement/anti inflammatory  metFORMIN (GLUCOPHAGE-XR) 500 MG extended release tablet, TAKE ONE TABLET BY MOUTH TWO TIMES A DAY AFTER MEALS  aspirin 81 MG EC tablet, Take 81 mg by mouth daily  Citalopram Hydrobromide (CELEXA PO), Take by mouth nightly   sotalol (BETAPACE) 80 MG tablet, Take 1 tablet by mouth 2 times daily  Misc Natural Products (GLUCOSAMINE CHONDROITIN ADV PO), Take 1 tablet by mouth daily  losartan-hydrochlorothiazide (HYZAAR) 50-12.5 MG per tablet, Take 1 tablet by mouth daily  vitamin D-3 (CHOLECALCIFEROL) 5000 UNITS TABS, Take 5,000 Units by mouth daily  celecoxib (CELEBREX) 200 MG capsule, Take 200 mg by mouth nightly   levothyroxine (SYNTHROID) 50 MCG tablet, Take 50 mcg by mouth nightly   ibuprofen (IBU) 800 MG tablet, Take 1 tablet by mouth every 6 hours as needed for Pain  ondansetron (ZOFRAN-ODT) 4 MG disintegrating tablet, Take 1 tablet by mouth every 8 hours as needed for Nausea or Vomiting    Allergies:  Sulfa antibiotics; Talwin [pentazocine]; Tetracyclines & related; and Bactrim [sulfamethoxazole-trimethoprim]    Social History:   TOBACCO:   reports that she has never smoked. She has never used smokeless tobacco.  ETOH:   reports current alcohol use.   Patient currently lives alone    Family History:       Problem Relation Age of Onset   Atha Ramiro Cancer Mother     Cancer Father        REVIEW OF SYSTEMS:  CONSTITUTIONAL:  Neg   Recent weight changes,fatigue,fever,chills or night sweats  EYES:  Neg  blurriness,tearing,itching or acute change in vision  EARS:  Neg hearing loss,tinnitus,vertigo,discharge or earache. NOSE:  Neg  rhinorrhea,sneezing,itching,allergy or epistaxis  MOUTH/THROAT:  Neg  bleeding gums,hoarseness or sore throat. RESPIRATORY:   Neg SOB,wheeze,cough,sputum,hemoptysis or bronochitis  CARDIOVASCULAR   Neg : chest pain,palpitations,dyspnea on exertion,orthopnea,paroxysmal nocturnal dyspnea or edema  GASTROINTESTINAL: Complains of abdominal pain. GENITOURINARY:  Neg  Urinary frequency,hesitancy,urgency,polyuria,dysuria,hematuria,nocturia,or incontinence. HEMATOLOGIC/LYMPHATIC:  Neg  Anemia,bleeding tendency  MUSCULOSKELETAL:    New myalgias,bone pain,joint pain,swelling or stiffness and has had  change in gait. NEUROLOGICAL:  Neg  Loss of Consciousness,memeory loss,forgetfulness,periods of confusion,difficulty concentrating,seizures,decline in intellect,nervousness,insomina,aphasia or dysarthria. SKIN :  Neg  skin or hair changes,and has no itching,rashes,sores. PSYCHIATRIC:  Neg depression,personality changes,anxiety. ENDOCRINE:  Neg polydipsia,polyuria,abnormal weight changes,heat /cold intolerance. ALL/IMM :  Neg reactions to drugs other than listed      PHYSICAL EXAM:  BP (!) 125/58   Pulse 57   Temp 97.7 °F (36.5 °C) (Oral)   Resp 18   Ht 5' 3\" (1.6 m)   Wt 247 lb (112 kg)   SpO2 99%   Breastfeeding No   BMI 43.75 kg/m²   General appearance: alert, appears stated age, cooperative and no distress  Head: Normocephalic, without obvious abnormality, atraumatic  Eyes: conjunctivae/corneas clear. PERRL, EOM's intact.    Ears: normal external ear canals both ears  Neck: no adenopathy, no carotid bruit, no JVD, supple, symmetrical, trachea midline and thyroid not enlarged, symmetric, no tenderness/mass/nodules  Lungs: clear to auscultation bilaterally,no rales or wheezes   Heart: regular rate and rhythm, S1, S2 normal, no murmur,  regular rate and rhythm   Abdomen:soft, non-tender; distended absent bowel sounds , no small bowel loops. The proximal small bowel loops are dilated with air-fluid levels. Distal collapsed small bowel loops are noted proximal to the ileostomy. Findings are concerning for small bowel obstruction with a transition point at the opening of the hernia. Findings are consistent for recurrence of hernia. The hernia sac measures 9.7 x 15.7 cm in AP and transverse dimension and 12.7 cm in craniocaudal dimension. .  The opening of the hernia sac measures 3.2 x 2.7 cm. No ischemic changes are noted at this time. Pelvis: The bladder is not well evaluated. There is no free fluid. Peritoneum/Retroperitoneum: There is no evidence of free air. Bones/Soft Tissues: Soft tissue gas is noted within the anterior abdominal wall, likely from recent parastomal hernia repair. 1. Patient had recent parastomal hernia repair. Soft tissue gas is noted within the anterior abdominal wall, likely related to recent surgery. 2. There is recurrence of a parastomal hernia with multiple small bowel loops within the hernia sac. Dilated proximal intra-abdominal small bowel loops are noted with air-fluid levels, concerning for small bowel obstruction. A transition point is noted at the opening of the hernia sac. No evidence of ischemic changes are seen at this time. Surgical evaluation is recommended. 3. Diffuse fatty infiltration of the liver. 4. Nonobstructing left renal calculi.        CBC with Differential:    Lab Results   Component Value Date    WBC 10.6 07/23/2020    RBC 4.27 07/23/2020    HGB 12.5 07/23/2020    HCT 39.7 07/23/2020     07/23/2020    MCV 93.0 07/23/2020    SEGSPCT 72 01/18/2014    LYMPHOPCT 14.4 07/23/2020     BMP:    Lab Results   Component Value Date     07/23/2020    K 3.7 07/23/2020     07/23/2020    CO2 27 07/23/2020    BUN 11 07/23/2020    CREATININE 1.1 07/23/2020    CALCIUM 9.4 07/23/2020    GFRAA 59 07/23/2020    LABGLOM 49 07/23/2020    GLUCOSE 134 07/23/2020    GLUCOSE 87

## 2020-07-24 NOTE — PROGRESS NOTES
GENERAL SURGERY  DAILY PROGRESS NOTE  7/24/2020    Subjective:  No n/v, tolerating clears, + ambulation, pain controlled    Objective:  /61   Pulse 58   Temp 97.9 °F (36.6 °C) (Oral)   Resp 16   Ht 5' 3\" (1.6 m)   Wt 247 lb (112 kg)   SpO2 96%   Breastfeeding No   BMI 43.75 kg/m²     GENERAL:  Laying in bed, no apparent distress  LUNGS:  No increased work of breathing, no cyanosis  CARDIOVASCULAR:  Extremities warm and well perfused,   ABDOMEN:  Soft, appropriate carlos incisional tenderness, non distended, stoma with bowel sweat, pink, R sided dressings clean with serous strikethrough  SKIN: Warm and dry    Assessment/Plan:  77 y.o. female sp ileostomy resiting, reduction of parastomal hernia 7/23 with post op ileus    -continue clear liquid diet until bowel function  -pain/nausea control  -encourage ambulation  -DC planning, pending bowel function      Electronically signed by Cliff Zafar MD on 7/24/2020 at 6:47 AM     As above  Await return of bowel function  Change RLQ wound dressing today  Teri out tomorrow  Home once stoma working  Gen diet  Ambulate    Blas Sanchez MD, MS  Minimally Invasive and Bariatric Surgery  [phone] (p)  7/24/2020  3:33 PM      g

## 2020-07-24 NOTE — PLAN OF CARE
Problem: Pain:  Goal: Pain level will decrease  Description: Pain level will decrease  7/24/2020 1616 by Mikaela Billy RN  Outcome: Met This Shift     Problem: Pain:  Goal: Control of acute pain  Description: Control of acute pain  7/24/2020 1616 by Mikaela Billy RN  Outcome: Met This Shift     Problem: Falls - Risk of:  Goal: Will remain free from falls  Description: Will remain free from falls  7/24/2020 1616 by Mikaela Billy RN  Outcome: Met This Shift     Problem: Falls - Risk of:  Goal: Absence of physical injury  Description: Absence of physical injury  7/24/2020 1616 by Mikaela Billy RN  Outcome: Met This Shift     Problem: Activity:  Goal: Risk for activity intolerance will decrease  Description: Risk for activity intolerance will decrease  Outcome: Met This Shift     Problem: Bowel/Gastric:  Goal: Diagnostic test results will improve  Description: Diagnostic test results will improve  Outcome: Met This Shift     Problem: Bowel/Gastric:  Goal: Occurrences of nausea will decrease  Description: Occurrences of nausea will decrease  7/24/2020 1616 by Mikaela Billy RN  Outcome: Met This Shift     Problem:  Bowel/Gastric:  Goal: Occurrences of vomiting will decrease  Description: Occurrences of vomiting will decrease  7/24/2020 1616 by Mikaela Billy RN  Outcome: Met This Shift     Problem: Fluid Volume:  Goal: Maintenance of adequate hydration will improve  Description: Maintenance of adequate hydration will improve  7/24/2020 1616 by Mikaela Billy RN  Outcome: Met This Shift     Problem: Health Behavior:  Goal: Ability to state signs and symptoms to report to health care provider will improve  Description: Ability to state signs and symptoms to report to health care provider will improve  Outcome: Met This Shift     Problem: Physical Regulation:  Goal: Complications related to the disease process, condition or treatment will be avoided or minimized  Description: Complications related to the disease process, condition or treatment will be avoided or minimized  Outcome: Met This Shift     Problem: Physical Regulation:  Goal: Ability to maintain clinical measurements within normal limits will improve  Description: Ability to maintain clinical measurements within normal limits will improve  Outcome: Met This Shift     Problem: Sensory:  Goal: Pain level will decrease  Description: Pain level will decrease  7/24/2020 1616 by Amaury Jacobs RN  Outcome: Met This Shift     Problem: Sensory:  Goal: Ability to identify factors that increase the pain will improve  Description: Ability to identify factors that increase the pain will improve  7/24/2020 1616 by Amaury Jacobs RN  Outcome: Met This Shift     Problem: Sensory:  Goal: Ability to notify healthcare provider of pain before it becomes unmanageable or unbearable will improve  Description: Ability to notify healthcare provider of pain before it becomes unmanageable or unbearable will improve  7/24/2020 1616 by Amaury Jacobs RN  Outcome: Met This Shift

## 2020-07-24 NOTE — PROGRESS NOTES
Patient ID:  Alessandra Seth  [id number]  7 y.o.  1945    HPI:  Patient on clear liquid diet, post revision new site ileostomy, so far no bowel movem Questions, answers, and tests reviewed. ROS:  Cardiovascular:   Denies any chest pain, irregular heartbeats, or palpitations. Respiratory:   Denies shortness of breath, coughing, sputum production, hemoptysis, or wheezing. Gastrointestinal:   Denies nausea, vomiting, diarrhea, or constipation. Denies any abdominal pain. Extremities:   Denies any lower extremity swelling or edema. Neurology:    Denies any headache or focal neurological deficits. No weakness or paresthesia. Derm:    Denies any rashes, ulcers, or excoriations. Denies bruising. Genitourinary:    Denies any urgency, frequency, hematuria. Voiding without difficulty. Physical Exam:    Vitals:    07/24/20 1400   BP: 137/66   Pulse: 57   Resp: 16   Temp: 98.1 °F (36.7 °C)   SpO2:        HEENT:  PERRLA. EOMI. Sclera clear. Buccal mucosa moist.    Neck:  Supple. Trachea midline. No thyromegaly. No JVD. No bruits. Heart:  Rhythm regular, rate controlled. No murmurs. Lungs:  Symmetrical. Clear to auscultation bilaterally. No wheezes. No rhonchi. No rales. Abdomen: Soft. Non-tender. Non-distended. Bowel sounds positive. No organomegaly or masses. No pain on palpation    Extremities:  Peripheral pulses present. No peripheral edema. No ulcers. Neurologic:  Alert x 3. No focal deficit. Cranial nerves grossly intact. Skin:  No petechia. No hemorrhage. No wounds.     Labs:  CBC:   Lab Results   Component Value Date    WBC 10.6 07/23/2020    RBC 4.27 07/23/2020    HGB 12.5 07/23/2020    HCT 39.7 07/23/2020    MCV 93.0 07/23/2020    MCH 29.3 07/23/2020    MCHC 31.5 07/23/2020    RDW 13.4 07/23/2020     07/23/2020    MPV 9.4 07/23/2020     CMP:    Lab Results   Component Value Date     07/23/2020    K 3.7 07/23/2020     07/23/2020    CO2 27 07/23/2020    BUN 11 07/23/2020    CREATININE 1.1 07/23/2020    GFRAA 59 07/23/2020    LABGLOM 49 07/23/2020    GLUCOSE 134 07/23/2020    GLUCOSE 87 03/22/2012    PROT 7.7 07/23/2020    LABALBU 3.8 07/23/2020    LABALBU 4.2 03/22/2012    CALCIUM 9.4 07/23/2020    BILITOT 0.4 07/23/2020    ALKPHOS 73 07/23/2020    AST 21 07/23/2020    ALT 16 07/23/2020     PT/INR:    Lab Results   Component Value Date    PROTIME 11.8 06/04/2019    INR 1.1 06/04/2019         CT ABDOMEN PELVIS W IV CONTRAST Additional Contrast? None   Final Result   1. Patient had recent parastomal hernia repair. Soft tissue gas is noted   within the anterior abdominal wall, likely related to recent surgery. 2. There is recurrence of a parastomal hernia with multiple small bowel loops   within the hernia sac. Dilated proximal intra-abdominal small bowel loops   are noted with air-fluid levels, concerning for small bowel obstruction. A   transition point is noted at the opening of the hernia sac. No evidence of   ischemic changes are seen at this time. Surgical evaluation is recommended. 3. Diffuse fatty infiltration of the liver. 4. Nonobstructing left renal calculi.              Other Data:      Intake/Output Summary (Last 24 hours) at 7/24/2020 1633  Last data filed at 7/24/2020 1445  Gross per 24 hour   Intake 1720 ml   Output 0 ml   Net 1720 ml         Scheduled Medications:   aspirin  81 mg Oral Daily    levothyroxine  50 mcg Oral Nightly    sotalol  120 mg Oral BID    Vitamin D  5,000 Units Oral Daily    metFORMIN  500 mg Oral BID WC    losartan  50 mg Oral Daily    And    hydrochlorothiazide  12.5 mg Oral Daily    sodium chloride flush  10 mL Intravenous 2 times per day    sodium chloride flush  10 mL Intravenous 2 times per day    pantoprazole  40 mg Intravenous Daily    And    sodium chloride (PF)  10 mL Intravenous Daily    enoxaparin  40 mg Subcutaneous Daily         Infusion Medications:      Assessment:   Patient Active Problem List Diagnosis Date Noted    SBO (small bowel obstruction) ([facility] [address] 07/24/2020    Small bowel obstruction ([facility] [address] 07/23/2020    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     Malfunction of colostomy stoma ([facility] [address]     Arthritis of knee, right 06/11/2019    Arthritis of knee, left 06/11/2019    Paroxysmal atrial fibrillation ([facility] [address] 06/29/2018    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 Encouraged to ambulate patient advised not to take meloxicam, patient has PRN Toradol she took so far only 1 injection,  Renal insufficiency patient received IV hydration. Continue current therapy. See orders for further plan of care. Completed By:  Dr. Nabil Eugene MD, [address]. Electronically signed by Duong Durbin MD on 7/24/20 at 7. 35AM EDT

## 2020-07-24 NOTE — OP NOTE
fashion. A time-out was performed to confirm the  surgical site and the patient's name. She received preoperative  antibiotics IV within 30 minutes of incision. I initially made a left  upper quadrant incision through the previous incision site, inserted  Veress needle, confirmed the needle placement, insufflated to 15 mmHg. I removed the Veress needle and inserted a 5-mm trocar. I inserted a  laparoscopic camera. Upon entrance in the abdomen, I could appreciate  copious amounts of small bowel, tented up to the abdominal wall. Inserted two more 5-mm trocars in the left lower quadrant and left  lateral abdomen. We then used a hand-over-hand technique in order to  reduce the small bowel from the incarcerated hernia. There was a large  amount of small bowel that was incarcerated within it. It was reduced  _____ healthy and viable but was dilated. I was able to reduce it to  the point where I could appreciate all the bowel was reduced. It  appeared that the hernia repair was still in place, just the small bowel  had migrated through the parastomal opening. The mesh was still in  place. The peritoneum was still closed. However, secondary to this  early recurrence with migration of bowel into the hernia sac, I elected  to remove the stoma to a different site. At this point, I performed an  elliptical incision around the previous stoma site decompressing the  abdomen. I used electrocautery to dissect around the stoma around the  hernia sac that did remain, although most of it had already been excised  until I reached the fascia. I then identified an area about 3 cm below  the edge of the skin level where I used [place] powered stapler with a  blue load in order to staple across the bowel and resect the ileostomy  site. I used a white load in order to take the mesentery. I then  dropped this back within the abdomen. Used a Wiliam and grabbed it and  pulled it up through a separate site.   I created a circular incision to  left of the umbilicus. Used electrocautery to dissect down to the  subcutaneous tissue. Made cruciate incision in the anterior fascial  sheath,  the rectus muscle. Identified the posterior fascial  sheath and created another cruciate incision. Two fingers were inserted  in the abdomen. Jerrol Chalet was inserted. The Stefani Feller was then used to  grasp the portion of the terminal ileum that had just previously been  stapled. So, it was pulled up without any tension and easily passed  through the abdominal wall. We then left in place. I then inspected  the fascial edges at the right lower quadrant previous ostotomy site. The area that had been previously closed with a Stratafix running suture  was still approximated. The muscle was still approximated. I elected  to leave in place. The mesh was identified and the stitches holding in  place were cut. The mesh was then removed and sent off the table and  labeled abdominal wall mesh. I then irrigated out the subcu space. Identified two good fascial edges. Used #1 PDS suture in running  fashion, running from lateral to medial and medial to lateral and tying  in the middle. There were full-thickness bites closing both the  anterior and posterior sheaths and reapproximating the rectus muscle. I  then irrigated out the subcu space, reinsufflated the abdomen and  inspected the site for its repair. There was no incarcerated bowel. The bowel was nicely tenting up to the abdominal wall through the new  ostomy site. I then decompressed the abdomen. I removed each one of  the trocar, reapproximated the skin with 4-0 Monocryl suture and  surgical glue. I then closed the right lower quadrant dermal level with  interrupted 0 Vicryl suture and then Betadine-soaked lisa were placed  in between to minimize the risk of postoperative wound infection. Placed the sterile dressing over the top.   A 4-0 Monocryl was used to  close the trocar site and surgical glue was placed over the top. I then  matured the ileostomy in [place] fashion using an interrupted 3-0 Vicryl  suture. I passed the finger through the fascia. It easily passed  through the fascia into the abdominal cavity with no evidence of any  stricture. Stool was then being emanating from the bowel with evidence  of patency. I then placed an ostomy appliance over the top. The  patient was then awoken, extubated, and transferred to the postoperative  care unit in stable condition. All instrument counts, lap counts, and  needle counts were correct at the completion of the procedure.         Nicolette Yates MD    D: 07/23/2020 16:30:22       T: 07/23/2020 19:16:20     [email or web address]  Job#: [id number]     Doc#: [id number]    CC:  Daniel Arechiga MD

## 2020-07-25 LAB
ANION GAP SERPL CALCULATED.3IONS-SCNC: 11 MMOL/L (ref 7–16)
BASOPHILS ABSOLUTE: 0.05 E9/L (ref 0–0.2)
BASOPHILS RELATIVE PERCENT: 0.5 % (ref 0–2)
BUN BLDV-MCNC: 15 MG/DL (ref 8–23)
CALCIUM SERPL-MCNC: 8.8 MG/DL (ref 8.6–10.2)
CHLORIDE BLD-SCNC: 100 MMOL/L (ref 98–107)
CO2: 26 MMOL/L (ref 22–29)
CREAT SERPL-MCNC: 1.1 MG/DL (ref 0.5–1)
EOSINOPHILS ABSOLUTE: 0.28 E9/L (ref 0.05–0.5)
EOSINOPHILS RELATIVE PERCENT: 2.6 % (ref 0–6)
GFR AFRICAN AMERICAN: 59
GFR NON-AFRICAN AMERICAN: 49 ML/MIN/1.73
GLUCOSE BLD-MCNC: 139 MG/DL (ref 74–99)
HCT VFR BLD CALC: 38.6 % (ref 34–48)
HEMOGLOBIN: 12.5 G/DL (ref 11.5–15.5)
IMMATURE GRANULOCYTES #: 0.05 E9/L
IMMATURE GRANULOCYTES %: 0.5 % (ref 0–5)
LYMPHOCYTES ABSOLUTE: 1.33 E9/L (ref 1.5–4)
LYMPHOCYTES RELATIVE PERCENT: 12.3 % (ref 20–42)
MAGNESIUM: 1.9 MG/DL (ref 1.6–2.6)
MCH RBC QN AUTO: 30.4 PG (ref 26–35)
MCHC RBC AUTO-ENTMCNC: 32.4 % (ref 32–34.5)
MCV RBC AUTO: 93.9 FL (ref 80–99.9)
METER GLUCOSE: 136 MG/DL (ref 74–99)
MONOCYTES ABSOLUTE: 1.21 E9/L (ref 0.1–0.95)
MONOCYTES RELATIVE PERCENT: 11.2 % (ref 2–12)
NEUTROPHILS ABSOLUTE: 7.86 E9/L (ref 1.8–7.3)
NEUTROPHILS RELATIVE PERCENT: 72.9 % (ref 43–80)
PDW BLD-RTO: 13.3 FL (ref 11.5–15)
PLATELET # BLD: 275 E9/L (ref 130–450)
PMV BLD AUTO: 9.5 FL (ref 7–12)
POTASSIUM REFLEX MAGNESIUM: 3.5 MMOL/L (ref 3.5–5)
RBC # BLD: 4.11 E12/L (ref 3.5–5.5)
SODIUM BLD-SCNC: 137 MMOL/L (ref 132–146)
WBC # BLD: 10.8 E9/L (ref 4.5–11.5)

## 2020-07-25 PROCEDURE — 80048 BASIC METABOLIC PNL TOTAL CA: CPT

## 2020-07-25 PROCEDURE — 85025 COMPLETE CBC W/AUTO DIFF WBC: CPT

## 2020-07-25 PROCEDURE — 1200000000 HC SEMI PRIVATE

## 2020-07-25 PROCEDURE — 36415 COLL VENOUS BLD VENIPUNCTURE: CPT

## 2020-07-25 PROCEDURE — C9113 INJ PANTOPRAZOLE SODIUM, VIA: HCPCS | Performed by: SURGERY

## 2020-07-25 PROCEDURE — 83735 ASSAY OF MAGNESIUM: CPT

## 2020-07-25 PROCEDURE — 6370000000 HC RX 637 (ALT 250 FOR IP): Performed by: SURGERY

## 2020-07-25 PROCEDURE — 6370000000 HC RX 637 (ALT 250 FOR IP): Performed by: INTERNAL MEDICINE

## 2020-07-25 PROCEDURE — 6360000002 HC RX W HCPCS: Performed by: SURGERY

## 2020-07-25 PROCEDURE — 2580000003 HC RX 258: Performed by: SURGERY

## 2020-07-25 PROCEDURE — 82962 GLUCOSE BLOOD TEST: CPT

## 2020-07-25 RX ORDER — SENNA PLUS 8.6 MG/1
1 TABLET ORAL NIGHTLY
Status: DISCONTINUED | OUTPATIENT
Start: 2020-07-25 | End: 2020-07-26 | Stop reason: HOSPADM

## 2020-07-25 RX ORDER — DOCUSATE SODIUM 100 MG/1
100 CAPSULE, LIQUID FILLED ORAL DAILY
Status: DISCONTINUED | OUTPATIENT
Start: 2020-07-25 | End: 2020-07-26 | Stop reason: HOSPADM

## 2020-07-25 RX ADMIN — DOCUSATE SODIUM 100 MG: 100 CAPSULE, LIQUID FILLED ORAL at 14:28

## 2020-07-25 RX ADMIN — SOTALOL HYDROCHLORIDE 120 MG: 80 TABLET ORAL at 20:54

## 2020-07-25 RX ADMIN — LEVOTHYROXINE SODIUM 50 MCG: 50 TABLET ORAL at 20:54

## 2020-07-25 RX ADMIN — LOSARTAN POTASSIUM 50 MG: 50 TABLET, FILM COATED ORAL at 09:31

## 2020-07-25 RX ADMIN — SOTALOL HYDROCHLORIDE 120 MG: 80 TABLET ORAL at 09:32

## 2020-07-25 RX ADMIN — ENOXAPARIN SODIUM 40 MG: 40 INJECTION SUBCUTANEOUS at 09:28

## 2020-07-25 RX ADMIN — PROCHLORPERAZINE EDISYLATE 10 MG: 5 INJECTION INTRAMUSCULAR; INTRAVENOUS at 08:50

## 2020-07-25 RX ADMIN — SENNOSIDES 8.6 MG: 8.6 TABLET, FILM COATED ORAL at 20:55

## 2020-07-25 RX ADMIN — MORPHINE SULFATE 4 MG: 4 INJECTION, SOLUTION INTRAMUSCULAR; INTRAVENOUS at 01:03

## 2020-07-25 RX ADMIN — PANTOPRAZOLE SODIUM 40 MG: 40 INJECTION, POWDER, FOR SOLUTION INTRAVENOUS at 14:29

## 2020-07-25 RX ADMIN — OXYCODONE HYDROCHLORIDE AND ACETAMINOPHEN 1 TABLET: 5; 325 TABLET ORAL at 09:33

## 2020-07-25 RX ADMIN — ASPIRIN 81 MG: 81 TABLET, COATED ORAL at 09:30

## 2020-07-25 RX ADMIN — KETOROLAC TROMETHAMINE 15 MG: 15 INJECTION, SOLUTION INTRAMUSCULAR; INTRAVENOUS at 10:05

## 2020-07-25 RX ADMIN — KETOROLAC TROMETHAMINE 15 MG: 15 INJECTION, SOLUTION INTRAMUSCULAR; INTRAVENOUS at 21:00

## 2020-07-25 RX ADMIN — METFORMIN HYDROCHLORIDE 500 MG: 500 TABLET, EXTENDED RELEASE ORAL at 17:17

## 2020-07-25 RX ADMIN — HYDROCHLOROTHIAZIDE 12.5 MG: 12.5 TABLET ORAL at 09:31

## 2020-07-25 RX ADMIN — Medication 10 ML: at 14:30

## 2020-07-25 RX ADMIN — OXYCODONE HYDROCHLORIDE AND ACETAMINOPHEN 1 TABLET: 5; 325 TABLET ORAL at 14:28

## 2020-07-25 RX ADMIN — PROCHLORPERAZINE EDISYLATE 10 MG: 5 INJECTION INTRAMUSCULAR; INTRAVENOUS at 01:12

## 2020-07-25 RX ADMIN — Medication 10 ML: at 21:00

## 2020-07-25 ASSESSMENT — PAIN SCALES - GENERAL
PAINLEVEL_OUTOF10: 10
PAINLEVEL_OUTOF10: 6
PAINLEVEL_OUTOF10: 0
PAINLEVEL_OUTOF10: 9
PAINLEVEL_OUTOF10: 1
PAINLEVEL_OUTOF10: 5
PAINLEVEL_OUTOF10: 7

## 2020-07-25 ASSESSMENT — PAIN DESCRIPTION - DESCRIPTORS: DESCRIPTORS: ACHING;DISCOMFORT;SORE

## 2020-07-25 ASSESSMENT — PAIN - FUNCTIONAL ASSESSMENT: PAIN_FUNCTIONAL_ASSESSMENT: PREVENTS OR INTERFERES SOME ACTIVE ACTIVITIES AND ADLS

## 2020-07-25 ASSESSMENT — PAIN DESCRIPTION - LOCATION: LOCATION: ABDOMEN

## 2020-07-25 ASSESSMENT — PAIN DESCRIPTION - ONSET: ONSET: ON-GOING

## 2020-07-25 ASSESSMENT — PAIN DESCRIPTION - FREQUENCY: FREQUENCY: CONTINUOUS

## 2020-07-25 ASSESSMENT — PAIN DESCRIPTION - ORIENTATION: ORIENTATION: LOWER

## 2020-07-25 ASSESSMENT — PAIN DESCRIPTION - PROGRESSION: CLINICAL_PROGRESSION: NOT CHANGED

## 2020-07-25 ASSESSMENT — PAIN DESCRIPTION - PAIN TYPE: TYPE: ACUTE PAIN;SURGICAL PAIN

## 2020-07-25 NOTE — PROGRESS NOTES
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    Malfunction of colostomy stoma ([address]    Small bowel obstruction ([address]    SBO (small bowel obstruction) ([address]       Subjective: improved, nausea, vomtiing this AM, stool in bag    Objective:  BP (!) 126/90   Pulse 126   Temp 97.9 °F (36.6 °C) (Oral)   Resp 20   Ht 5' 3\" (1.6 m)   Wt 247 lb (112 kg)   SpO2 93%   Breastfeeding No   BMI 43.75 kg/m²   Labs:  Recent Labs     07/23/20  0030 07/23/20  0459 07/25/20  0416   WBC 10.7 10.6 10.8   HGB 13.7 12.5 12.5   HCT 41.6 39.7 38.6     Lab Results   Component Value Date    CREATININE 1.1 (H) 07/25/2020    BUN 15 07/25/2020     07/25/2020    K 3.5 07/25/2020     07/25/2020    CO2 26 07/25/2020     Recent Labs     07/23/20  0030   LIPASE 44         General appearance:  NAD  Head: NCAT, PERRLA, EOMI, red conjunctiva  Neck: supple, no masses  Lungs: CTAB, equal chest rise bilateral  Heart: Reg rate  Abdomen: soft, nondistended, tender appropriately, incision C/D/I, stoma with stool and air  Skin; no lesions  Gu: no cva tenderness  Extremities: extremities normal, atraumatic, no cyanosis or edema      Assessment/Plan:  Stacie Lopez is a 77 y.o. female POD 2 revision ileostomy, repair parastomal hernia, ileus persists as expected    Ambulate  Home once improved and nausea resolved  Stoma care  Teri out of wound today, dry dressing daily  Ok to shower tomorrow    Physician Signature: Electronically signed by Dr. Court Lobaot  [phone] (p)  7/25/2020  10:33 AM

## 2020-07-25 NOTE — PLAN OF CARE
Problem: Pain:  Goal: Pain level will decrease  Description: Pain level will decrease  7/25/2020 0330 by Maria Luz Dsouza RN  Outcome: Met This Shift     Problem: Pain:  Goal: Control of acute pain  Description: Control of acute pain  7/25/2020 0330 by Maria Luz Dsouza RN  Outcome: Met This Shift     Problem: Falls - Risk of:  Goal: Will remain free from falls  Description: Will remain free from falls  7/25/2020 0330 by Maria Luz Dsouza RN  Outcome: Met This Shift     Problem: Falls - Risk of:  Goal: Absence of physical injury  Description: Absence of physical injury  7/25/2020 0330 by Maria Luz Dsouza RN  Outcome: Met This Shift     Problem: Activity:  Goal: Risk for activity intolerance will decrease  Description: Risk for activity intolerance will decrease  7/25/2020 0330 by Maria Luz Dsouza RN  Outcome: Met This Shift     Problem: Bowel/Gastric:  Goal: Bowel function will improve  Description: Bowel function will improve  Outcome: Met This Shift     Problem:  Bowel/Gastric:  Goal: Occurrences of vomiting will decrease  Description: Occurrences of vomiting will decrease  7/25/2020 0330 by Maria Luz Dsouza RN  Outcome: Met This Shift     Problem: Fluid Volume:  Goal: Maintenance of adequate hydration will improve  Description: Maintenance of adequate hydration will improve  7/25/2020 0330 by Maria Luz Dsouza RN  Outcome: Met This Shift     Problem: Health Behavior:  Goal: Ability to state signs and symptoms to report to health care provider will improve  Description: Ability to state signs and symptoms to report to health care provider will improve  7/25/2020 0330 by Maria Luz Dsouza RN  Outcome: Met This Shift     Problem: Sensory:  Goal: Pain level will decrease  Description: Pain level will decrease  7/25/2020 0330 by Maria Luz Dsouza RN  Outcome: Met This Shift     Problem: Sensory:  Goal: Ability to identify factors that increase the pain will improve  Description: Ability to identify factors that increase the pain will improve  7/25/2020 0330 by Shital Andrews, RN  Outcome: Met This Shift

## 2020-07-26 VITALS
RESPIRATION RATE: 16 BRPM | OXYGEN SATURATION: 96 % | HEIGHT: 63 IN | WEIGHT: 247 LBS | TEMPERATURE: 97 F | HEART RATE: 71 BPM | DIASTOLIC BLOOD PRESSURE: 74 MMHG | SYSTOLIC BLOOD PRESSURE: 152 MMHG | BODY MASS INDEX: 43.77 KG/M2

## 2020-07-26 PROCEDURE — 6370000000 HC RX 637 (ALT 250 FOR IP): Performed by: SURGERY

## 2020-07-26 PROCEDURE — C9113 INJ PANTOPRAZOLE SODIUM, VIA: HCPCS | Performed by: SURGERY

## 2020-07-26 PROCEDURE — 6370000000 HC RX 637 (ALT 250 FOR IP): Performed by: INTERNAL MEDICINE

## 2020-07-26 PROCEDURE — 2580000003 HC RX 258: Performed by: SURGERY

## 2020-07-26 PROCEDURE — 6360000002 HC RX W HCPCS: Performed by: SURGERY

## 2020-07-26 RX ADMIN — HYDROCHLOROTHIAZIDE 12.5 MG: 12.5 TABLET ORAL at 09:07

## 2020-07-26 RX ADMIN — Medication 10 ML: at 09:15

## 2020-07-26 RX ADMIN — METFORMIN HYDROCHLORIDE 500 MG: 500 TABLET, EXTENDED RELEASE ORAL at 09:07

## 2020-07-26 RX ADMIN — PANTOPRAZOLE SODIUM 40 MG: 40 INJECTION, POWDER, FOR SOLUTION INTRAVENOUS at 09:07

## 2020-07-26 RX ADMIN — LOSARTAN POTASSIUM 50 MG: 50 TABLET, FILM COATED ORAL at 09:06

## 2020-07-26 RX ADMIN — ENOXAPARIN SODIUM 40 MG: 40 INJECTION SUBCUTANEOUS at 09:06

## 2020-07-26 RX ADMIN — OXYCODONE HYDROCHLORIDE AND ACETAMINOPHEN 1 TABLET: 5; 325 TABLET ORAL at 09:14

## 2020-07-26 RX ADMIN — SOTALOL HYDROCHLORIDE 120 MG: 80 TABLET ORAL at 09:07

## 2020-07-26 RX ADMIN — SODIUM CHLORIDE, PRESERVATIVE FREE 10 ML: 5 INJECTION INTRAVENOUS at 09:19

## 2020-07-26 RX ADMIN — ASPIRIN 81 MG: 81 TABLET, COATED ORAL at 09:06

## 2020-07-26 ASSESSMENT — PAIN DESCRIPTION - FREQUENCY: FREQUENCY: INTERMITTENT

## 2020-07-26 ASSESSMENT — PAIN DESCRIPTION - DESCRIPTORS: DESCRIPTORS: ACHING;DISCOMFORT;SORE;TENDER

## 2020-07-26 ASSESSMENT — PAIN SCALES - GENERAL: PAINLEVEL_OUTOF10: 4

## 2020-07-26 ASSESSMENT — PAIN DESCRIPTION - PROGRESSION: CLINICAL_PROGRESSION: GRADUALLY IMPROVING

## 2020-07-26 ASSESSMENT — PAIN DESCRIPTION - ONSET: ONSET: ON-GOING

## 2020-07-26 ASSESSMENT — PAIN DESCRIPTION - PAIN TYPE: TYPE: ACUTE PAIN;SURGICAL PAIN

## 2020-07-26 ASSESSMENT — PAIN DESCRIPTION - LOCATION: LOCATION: ABDOMEN

## 2020-07-26 NOTE — PROGRESS NOTES
Packing removed from surgical wound. Minimal amount of blood noted. Patient tolerated well. No signs of distress.

## 2020-07-26 NOTE — DISCHARGE SUMMARY
peritoneal signs, incision C/D/I  Extremities: extremities normal, atraumatic, no cyanosis or edema    Disposition: home    Patient Instructions: Activity: no lifting, Driving, or Strenuous exercise for 2 weeks  Diet: regular diet  Wound Care: keep wound clean and dry    Follow-up with Dr Junito Aguiar in 2 weeks.     SignedZackeligio Valera  7/26/2020  10:13 AM

## 2020-07-27 ENCOUNTER — HOSPITAL ENCOUNTER (OUTPATIENT)
Dept: WOUND CARE | Age: 75
Discharge: HOME | End: 2020-07-27
Payer: MEDICARE

## 2020-07-27 PROCEDURE — 99213 OFFICE O/P EST LOW 20 MIN: CPT

## 2020-07-27 NOTE — CARE COORDINATION
CM note: Post discharge follow up call placed, but no answer. Left VM, asked the patient to call back is she had any questions/concerns.

## 2020-07-27 NOTE — PROGRESS NOTES
ET Nurse OUTPATIENT VISIT  Admit Date: 7/27/2020 11:00 AM    Reason for consult:  NEW ILEOSTOMY    Significant history:    Past Medical History:   Diagnosis Date    Arthritis     History of blood transfusion     History of cardiovascular stress test 6/2015    lexiscan    Hypertension     Sciatica     Right    Spinal headache     Tachycardia     on metoprolol ,paroxysmal, usually at HS    Thyroid disease        Stoma assessment:  Stoma good red color flat sl larger than 11/4 functioning for liquid stool  Peristomal skin is sl red  Stoma lies in a crease    Pt had a colostomy before    Plan:    inst on a Ileostomy stoma  inst re need for fluids and assessment of stool  inst on new equipment  Applied coloplast 2 piece  Convex wafer [id number]                 coloplast 2 piece pouch #[id number]  Ostomy paste and ostomy powder  Barrier strips    Clinical Level of Care Assessment    Outpatient Ostomy Care      NAME:  Carlota Viera  YOB: 1945  MEDICAL RECORD NUMBER:  [id number]   DATE:  7/27/2020      Patient Mignon Murphy Assessment- Document in Flowsheet I&O   Points   Review of chart []   0   Assess Complete Ostomy tab in Navigator for assessment of; stoma status, peristomal skin, presence of hernia/stool consistency/diet/related medications   Simple adjustments to pouch size/pouch system, new stoma pattern, accessory addition/deletion. []   1   Assess Complete Ostomy tab in Navigator for assessment of; stoma status, peristomal skin, presence of hernia/stool consistency/diet/related medications   Moderate adjustments to pouch size/pouch system, new stoma pattern, accessory addition/deletion. Observe patient/caregiver with hands-on care. 1-2 adjustments to pouch size/system/skin care/accessory addition or deletion.     [x]   2   Assess Complete Ostomy tab in Navigator for assessment of; stoma status, peristomal skin, presence of hernia/stool consistency/diet/related medications   Complex adjustments to pouch additional topics   Pre-operative ostomy education with review of written resources for patient/family/caregiver as needed. Demonstration/return demonstration of ostomy irrigation  Documentation in [facility] completed. []   3     Patient Assessment and Planning in [facility] Clinical Note   Planning Definition Points   Simple Simple pouch change procedure completed and reviewed with patient/family/caregiver   Documentation in [facility] completed. []   1   Intermediate Moderate level of follow-up needs:   Pouch change/discharge procedure revised and reviewed with patient/caregiver. Communications with outside resources; i.e. Telephone calls to Surgeon/ PCP, family/caregiver, home health, ECF. Documentation in [facility] completed. [x]   2   Complex Complex level of instructions/changes:   Family/Caregiver learning/demonstration/return demonstration visit. Pouching/discharge procedure revised/reviewed with patient/family/caregiver. Contact with outside resources; i.e. communication with Surgeon/ PCP, home health, ECF. Contact/referral to ostomy appliance supplier for new or additional products. Review when to call [facility] or schedule follow-up visit. Referral to Emergency Department   Documentation in [facility] completed. []   3       Is this the Patient's First Visit with [facility] @ [facility]? Yes    Is this Patient Established to this [facility] within the last 3 years?    Yes             Clinical Level of Care      Points  0-3  Level 1 []     Points  4-6  Level 2 []     Points  7-8  Level 3 [x]     Points  9-10  Level 4 []     Points  11-12  Level 5 []       Electronically signed by Rosa Isela Schulte RN on 7/27/2020 at 12:18 PM      Rosa Isela Schulte 7/27/2020 12:13 PM

## 2020-08-06 ENCOUNTER — HOSPITAL ENCOUNTER (OUTPATIENT)
Dept: WOUND CARE | Age: 75
Discharge: HOME | End: 2020-08-06
Payer: MEDICARE

## 2020-08-06 PROCEDURE — 99212 OFFICE O/P EST SF 10 MIN: CPT

## 2020-08-06 NOTE — PROGRESS NOTES
peristomal skin, presence of hernia/stool consistency/diet/related medications   Complex adjustments to pouch size/pouch system, new stoma pattern, accessory addition/deletion. 3 or more complex adjustments to pouch size/system/skin care/accessory addition or deletion. Observe patient/caregiver with hands-on care. Assess patient/patient abdomen for optimal pre-marked stoma site. Assess patient abdomen for type of hernia belt/accessory needed. []   3         Ambulation Status Documented in [facility] Clinical Note  Status Definition Points   Independent Independently able to ambulate. Fully able (without any assistance) to get on/off exam table/chair. [x]   0   Minimal Physical Assistance Requires physical assistance of one person to ambulate and/or position patient to be examined. Includes necessary physical assistance to position lower extremities on/off stool. []   1   Moderate Physical Assistance Requires at least one staff member to physically assist patient in ambulating into treatment room, and/or on off chair/bed. Requires assistance to bathroom. []   2   Full Assistance Requires assistance of at least two staff members to transfer patient into treatment room and/or on/off bed/chair. \"Total Transfer\". Unable to use bathroom requires bedside commode and/or bedpan []   3       Teaching Effort Documented in [facility] Clinical Note  Effort Definition Points   No Teaching  []   0   General Initial/Simple lesson:  Assess readiness to learn, assess patient learning style to determine educational flow/special needs for learning. Teaching related to 1-3 topics  Documentation in [facility] completed. []   1   Intermediate Assess readiness to learn, assess patient learning style to determine educational flow/special needs for learning. Teaching related to 3-4 topics. Hernia belt application and care considerations  Documentation in [facility] completed.    [x]   2   Complex Assess readiness to learn, assess patient learning style to determine educational flow/special needs for learning. Teaching of greater than 5 additional topics   Pre-operative ostomy education with review of written resources for patient/family/caregiver as needed. Demonstration/return demonstration of ostomy irrigation  Documentation in [facility] completed. []   3     Patient Assessment and Planning in [facility] Clinical Note   Planning Definition Points   Simple Simple pouch change procedure completed and reviewed with patient/family/caregiver   Documentation in [facility] completed. []   1   Intermediate Moderate level of follow-up needs:   Pouch change/discharge procedure revised and reviewed with patient/caregiver. Communications with outside resources; i.e. Telephone calls to Surgeon/ PCP, family/caregiver, home health, ECF. Documentation in [facility] completed. [x]   2   Complex Complex level of instructions/changes:   Family/Caregiver learning/demonstration/return demonstration visit. Pouching/discharge procedure revised/reviewed with patient/family/caregiver. Contact with outside resources; i.e. communication with Surgeon/ PCP, home health, ECF. Contact/referral to ostomy appliance supplier for new or additional products. Review when to call [facility] or schedule follow-up visit. Referral to Emergency Department   Documentation in [facility] completed. []   3       Is this the Patient's First Visit with [facility] @ [facility]? Yes    Is this Patient Established to this [facility] within the last 3 years?    Yes             Clinical Level of Care      Points  0-3  Level 1 []     Points  4-6  Level 2 [x]     Points  7-8  Level 3 []     Points  9-10  Level 4 []     Points  11-12  Level 5 []       Electronically signed by Gay Bailey RN on 8/6/2020 at 1:35 PM      Gay Bailey 8/6/2020 1:32 PM

## 2020-08-10 ENCOUNTER — OFFICE VISIT (OUTPATIENT)
Dept: SURGERY | Age: 75
End: 2020-08-10

## 2020-08-10 ENCOUNTER — TELEPHONE (OUTPATIENT)
Dept: SURGERY | Age: 75
End: 2020-08-10

## 2020-08-10 VITALS
HEIGHT: 64 IN | OXYGEN SATURATION: 96 % | DIASTOLIC BLOOD PRESSURE: 91 MMHG | WEIGHT: 237 LBS | TEMPERATURE: 97.5 F | SYSTOLIC BLOOD PRESSURE: 182 MMHG | BODY MASS INDEX: 40.46 KG/M2 | HEART RATE: 62 BPM

## 2020-08-10 PROCEDURE — 99024 POSTOP FOLLOW-UP VISIT: CPT | Performed by: SURGERY

## 2020-08-10 NOTE — TELEPHONE ENCOUNTER
Per Dr. Marley Alexandra, patient is scheduled for Laparoscopic possible robotic ileostomy revision for Ileostomy in place z93.2 at [facility] on 9/1/20. Surgery scheduling form faxed with confirmation, surgeon's calendar updated. Dr. Marley Alexandra to enter orders. Follow up appointment scheduled. Stoma marking to be done by Sybil Wilson prior to surgery. Will check if pre-cert is required. Per [employer] portal pre-certification required. Clinicals faxed to [phone]. Electronically signed by Ashish Crowder RN on 8/13/2020 at 12:28 PM    Per [employer] rep, cpt [id number] has been approved with an observation stay to follow. If additional inpatient stay required, facility to obtain authorization. Approval# [id number].    Electronically signed by Ashish Crowder RN on 8/27/2020 at 1:31 PM

## 2020-08-11 NOTE — PROGRESS NOTES
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 No nausea, no vomiting. She has been cutting the stoma opening bigger and her skin is now very irritated. No RLQ pain    Objective:  BP (!) 182/91   Pulse 62   Temp 97.5 °F (36.4 °C) (Temporal)   Ht 5' 3.5\" (1.613 m)   Wt 237 lb (107.5 kg)   SpO2 96%   BMI 41.32 kg/m²   Labs:  No results for input(s): WBC, HGB, HCT in the last 72 hours. Invalid input(s): PLR  Lab Results   Component Value Date    CREATININE 1.1 (H) 07/25/2020    BUN 15 07/25/2020     07/25/2020    K 3.5 07/25/2020     07/25/2020    CO2 26 07/25/2020     No results for input(s): LIPASE, AMYLASE in the last 72 hours. General appearance: AA, NAD  HEENT: NCAT, PERRLA, EOMI  Lungs: Clear, equal rise bilateral  Heart: Reg  Abdomen: soft, nondistended, nontender, incisions well healed, RLQ incision clean, dry, small opening middle of incision with exudate, debrided and dressed.  LLQ stoma with skin irritation surrounding, stoma bag is cut too large and skin is showing chemical burn from biliary contact, changed bag and used colopaste to obtain seal  Skin: No lesions, incisions well healed  Psych: No distress, conversive, no hallucinations  : No ulcers or lesions  Rectal: No bleeding    A complete 10 system review was performed and are otherwise negative unless mentioned in the above HPI. Specific negatives are listed below but may not include all those reviewed. General ROS: negative obtundation, AMS  ENT ROS: negative rhinorrhea, epistaxis  Allergy and Immunology ROS: negative itchy/watery eyes or nasal congestion  Hematological and Lymphatic ROS: negative spontaneous bleeding or bruising  Endocrine ROS: negative  lethargy, mood swings, palpitations or polydipsia/polyuria  Respiratory ROS: negative sputum changes, stridor, tachypnea or wheezing  Cardiovascular ROS: negative for - loss of consciousness, murmur or orthopnea  Gastrointestinal ROS: negative for - hematochezia or hematemesis  Genito-Urinary ROS: negative for -  genital discharge or hematuria  Musculoskeletal ROS: negative for - focal weakness, gangrene  Psych/Neuro ROS: negative for - visual or auditory hallucinations, suicidal ideation      Time spent reviewing past medical, surgical, social and family history, vitals, nursing assessment and images. Assessment/Plan:  Chapito Powers is a 77 y.o. female 2 weeks s/p repair parastomal hernia revision and moving of end ileostomy     She had no issues with stoma seal  In hospital but now is not able to seal with activity  She would like stoma moved again  Discussed need 3-4 weeks to heal RLQ incision before we can move stoma back  Other options to move more cephalad on right or left.  She is agreeable  Discussed needs to cut stoma opening to appropriate size to prevent skin excoriation  Barrier paste daily, colopaste or raised bumpers to bridge skin gap  Follow up with stoma nurse for trouble shooting again  Will plan to move stoma in 3-4 weeks      Physician Signature: Electronically

## 2020-08-13 ENCOUNTER — TELEPHONE (OUTPATIENT)
Dept: SURGERY | Age: 75
End: 2020-08-13

## 2020-08-13 RX ORDER — CLINDAMYCIN HYDROCHLORIDE 300 MG/1
300 CAPSULE ORAL 2 TIMES DAILY
Qty: 14 CAPSULE | Refills: 0 | Status: SHIPPED | OUTPATIENT
Start: 2020-08-13 | End: 2020-08-20

## 2020-08-13 RX ORDER — NYSTATIN 100000 U/G
CREAM TOPICAL
Qty: 15 G | Refills: 0 | Status: SHIPPED | OUTPATIENT
Start: 2020-08-13

## 2020-08-13 NOTE — TELEPHONE ENCOUNTER
Called to discuss warm cellulitis around stoma. Clinda for 10 days, Nystatin cream topical for 2 weeks.      Flora Emerson MD, MS  Minimally Invasive and Bariatric Surgery  [phone] (p)  8/13/2020  4:17 PM

## 2020-08-14 ENCOUNTER — HOSPITAL ENCOUNTER (OUTPATIENT)
Dept: WOUND CARE | Age: 75
Discharge: HOME | End: 2020-08-14
Payer: MEDICARE

## 2020-08-14 PROCEDURE — 99212 OFFICE O/P EST SF 10 MIN: CPT

## 2020-08-14 NOTE — PROGRESS NOTES
ET Nurse  Admit Date: 8/14/2020 11:44 AM    Reason for consult:  Ileostomy    Significant history:    Past Medical History:   Diagnosis Date    Arthritis     History of blood transfusion     History of cardiovascular stress test 6/2015    lexiscan    Hypertension     Sciatica     Right    Spinal headache     Tachycardia     on metoprolol ,paroxysmal, usually at HS    Thyroid disease        Stoma assessment:  Stoma good red color peristomal skin is red moist below stom   Very moist  Pt having leakage on left side    Plan:    Treated sore skin with domeboros compress  Applied powder and no sting skin barrier  inst her on use  Applied one piece pouch convex  Applied ring and sm amt of paste  inst her to order a belt  Pt will be scheduled for revision  Gave Dr Shirly Cowden updat on ostomy    Clinical Level of Care Assessment    Outpatient Ostomy Care      NAME:  Lizandro Almendarez  YOB: 1945  MEDICAL RECORD NUMBER:  [id number]   DATE:  8/14/2020      Patient Sarah Norman Assessment- Document in Flowsheet I&O   Points   Review of chart []   0   Assess Complete Ostomy tab in Navigator for assessment of; stoma status, peristomal skin, presence of hernia/stool consistency/diet/related medications   Simple adjustments to pouch size/pouch system, new stoma pattern, accessory addition/deletion. []   1   Assess Complete Ostomy tab in Navigator for assessment of; stoma status, peristomal skin, presence of hernia/stool consistency/diet/related medications   Moderate adjustments to pouch size/pouch system, new stoma pattern, accessory addition/deletion. Observe patient/caregiver with hands-on care. 1-2 adjustments to pouch size/system/skin care/accessory addition or deletion.     [x]   2   Assess Complete Ostomy tab in Navigator for assessment of; stoma status, peristomal skin, presence of hernia/stool consistency/diet/related medications   Complex adjustments to pouch size/pouch system, new stoma pattern, accessory addition/deletion. 3 or more complex adjustments to pouch size/system/skin care/accessory addition or deletion. Observe patient/caregiver with hands-on care. Assess patient/patient abdomen for optimal pre-marked stoma site. Assess patient abdomen for type of hernia belt/accessory needed. []   3         Ambulation Status Documented in [facility] Clinical Note  Status Definition Points   Independent Independently able to ambulate. Fully able (without any assistance) to get on/off exam table/chair. []   0   Minimal Physical Assistance Requires physical assistance of one person to ambulate and/or position patient to be examined. Includes necessary physical assistance to position lower extremities on/off stool. [x]   1   Moderate Physical Assistance Requires at least one staff member to physically assist patient in ambulating into treatment room, and/or on off chair/bed. Requires assistance to bathroom. []   2   Full Assistance Requires assistance of at least two staff members to transfer patient into treatment room and/or on/off bed/chair. \"Total Transfer\". Unable to use bathroom requires bedside commode and/or bedpan []   3       Teaching Effort Documented in [facility] Clinical Note  Effort Definition Points   No Teaching  []   0   General Initial/Simple lesson:  Assess readiness to learn, assess patient learning style to determine educational flow/special needs for learning. Teaching related to 1-3 topics  Documentation in [facility] completed. []   1   Intermediate Assess readiness to learn, assess patient learning style to determine educational flow/special needs for learning. Teaching related to 3-4 topics. Hernia belt application and care considerations  Documentation in [facility] completed. [x]   2   Complex Assess readiness to learn, assess patient learning style to determine educational flow/special needs for learning.   Teaching of greater than 5 additional topics   Pre-operative ostomy education with review of written resources for patient/family/caregiver as needed. Demonstration/return demonstration of ostomy irrigation  Documentation in [facility] completed. []   3     Patient Assessment and Planning in [facility] Clinical Note   Planning Definition Points   Simple Simple pouch change procedure completed and reviewed with patient/family/caregiver   Documentation in [facility] completed. []   1   Intermediate Moderate level of follow-up needs:   Pouch change/discharge procedure revised and reviewed with patient/caregiver. Communications with outside resources; i.e. Telephone calls to Surgeon/ PCP, family/caregiver, home health, ECF. Documentation in [facility] completed. [x]   2   Complex Complex level of instructions/changes:   Family/Caregiver learning/demonstration/return demonstration visit. Pouching/discharge procedure revised/reviewed with patient/family/caregiver. Contact with outside resources; i.e. communication with Surgeon/ PCP, home health, ECF. Contact/referral to ostomy appliance supplier for new or additional products. Review when to call [facility] or schedule follow-up visit. Referral to Emergency Department   Documentation in [facility] completed. []   3       Is this the Patient's First Visit with [facility] @ [facility]? Yes    Is this Patient Established to this [facility] within the last 3 years?    Yes             Clinical Level of Care      Points  0-3  Level 1 []     Points  4-6  Level 2 [x]     Points  7-8  Level 3 []     Points  9-10  Level 4 []     Points  11-12  Level 5 []       Electronically signed by Ravi Bravo RN on 8/14/2020 at 11:56 AM      Ravi Bravo 8/14/2020 11:54 AM

## 2020-08-20 ENCOUNTER — HOSPITAL ENCOUNTER (OUTPATIENT)
Dept: WOUND CARE | Age: 75
Discharge: HOME | End: 2020-08-20
Payer: MEDICARE

## 2020-08-20 NOTE — PROGRESS NOTES
ET Nurse OUT PATIENT  VISIT  Admit Date: 8/20/2020 10:00 AM    Reason for consult:  Stoma marking right abd    Significant history:    Past Medical History:   Diagnosis Date    Arthritis     History of blood transfusion     History of cardiovascular stress test 6/2015    lexiscan    Hypertension     Sciatica     Right    Spinal headache     Tachycardia     on metoprolol ,paroxysmal, usually at HS    Thyroid disease        Stoma assessment:          Stoma good red color  Stoma lies in a crease cont to have skin breakdown around stoma  Applied 2 piece system coloplast    Plan: Today saw pt for stoma marking      Standing      Siting    Site marked above umbilicus away from crease  Discussed diet needs for an Ileostomy  Covered site with tegaderm  Pt will have sugery in sept  She has enough supplies for old site  Clinical Level of Care Assessment    Outpatient Ostomy Care      NAME:  Dipak Almendarez  YOB: 1945  MEDICAL RECORD NUMBER:  [id number]   DATE:  8/20/2020      Patient Nicholas Smith Assessment- Document in Flowsheet I&O   Points   Review of chart []   0   Assess Complete Ostomy tab in Navigator for assessment of; stoma status, peristomal skin, presence of hernia/stool consistency/diet/related medications   Simple adjustments to pouch size/pouch system, new stoma pattern, accessory addition/deletion. []   1   Assess Complete Ostomy tab in Navigator for assessment of; stoma status, peristomal skin, presence of hernia/stool consistency/diet/related medications   Moderate adjustments to pouch size/pouch system, new stoma pattern, accessory addition/deletion. Observe patient/caregiver with hands-on care. 1-2 adjustments to pouch size/system/skin care/accessory addition or deletion.     [x]   2   Assess Complete Ostomy tab in Navigator for assessment of; stoma status, peristomal skin, presence of hernia/stool consistency/diet/related medications   Complex adjustments to pouch size/pouch system, new stoma pattern, accessory addition/deletion. 3 or more complex adjustments to pouch size/system/skin care/accessory addition or deletion. Observe patient/caregiver with hands-on care. Assess patient/patient abdomen for optimal pre-marked stoma site. Assess patient abdomen for type of hernia belt/accessory needed. []   3         Ambulation Status Documented in [facility] Clinical Note  Status Definition Points   Independent Independently able to ambulate. Fully able (without any assistance) to get on/off exam table/chair. [x]   0   Minimal Physical Assistance Requires physical assistance of one person to ambulate and/or position patient to be examined. Includes necessary physical assistance to position lower extremities on/off stool. []   1   Moderate Physical Assistance Requires at least one staff member to physically assist patient in ambulating into treatment room, and/or on off chair/bed. Requires assistance to bathroom. []   2   Full Assistance Requires assistance of at least two staff members to transfer patient into treatment room and/or on/off bed/chair. \"Total Transfer\". Unable to use bathroom requires bedside commode and/or bedpan []   3       Teaching Effort Documented in [facility] Clinical Note  Effort Definition Points   No Teaching  []   0   General Initial/Simple lesson:  Assess readiness to learn, assess patient learning style to determine educational flow/special needs for learning. Teaching related to 1-3 topics  Documentation in [facility] completed. []   1   Intermediate Assess readiness to learn, assess patient learning style to determine educational flow/special needs for learning. Teaching related to 3-4 topics. Hernia belt application and care considerations  Documentation in [facility] completed. [x]   2   Complex Assess readiness to learn, assess patient learning style to determine educational flow/special needs for learning.   Teaching of greater than 5 additional topics Pre-operative ostomy education with review of written resources for patient/family/caregiver as needed. Demonstration/return demonstration of ostomy irrigation  Documentation in [facility] completed. []   3     Patient Assessment and Planning in [facility] Clinical Note   Planning Definition Points   Simple Simple pouch change procedure completed and reviewed with patient/family/caregiver   Documentation in [facility] completed. []   1   Intermediate Moderate level of follow-up needs:   Pouch change/discharge procedure revised and reviewed with patient/caregiver. Communications with outside resources; i.e. Telephone calls to Surgeon/ PCP, family/caregiver, home health, ECF. Documentation in [facility] completed. [x]   2   Complex Complex level of instructions/changes:   Family/Caregiver learning/demonstration/return demonstration visit. Pouching/discharge procedure revised/reviewed with patient/family/caregiver. Contact with outside resources; i.e. communication with Surgeon/ PCP, home health, ECF. Contact/referral to ostomy appliance supplier for new or additional products. Review when to call [facility] or schedule follow-up visit. Referral to Emergency Department   Documentation in [facility] completed. []   3       Is this the Patient's First Visit with [facility] @ [facility]? Yes    Is this Patient Established to this [facility] within the last 3 years?    Yes             Clinical Level of Care      Points  0-3  Level 1 []     Points  4-6  Level 2 [x]     Points  7-8  Level 3 []     Points  9-10  Level 4 []     Points  11-12  Level 5 []       Electronically signed by Nik Givens RN on 8/20/2020 at 1:16 PM            Nik Givens 8/20/2020 1:10 PM

## 2020-08-27 ENCOUNTER — HOSPITAL ENCOUNTER (OUTPATIENT)
Dept: PREADMISSION TESTING | Age: 75
Discharge: HOME | End: 2020-08-27
Payer: MEDICARE

## 2020-08-27 ENCOUNTER — HOSPITAL ENCOUNTER (OUTPATIENT)
Age: 75
Discharge: HOME | End: 2020-08-29
Payer: MEDICARE

## 2020-08-27 VITALS
HEART RATE: 60 BPM | BODY MASS INDEX: 40.29 KG/M2 | DIASTOLIC BLOOD PRESSURE: 63 MMHG | WEIGHT: 236 LBS | SYSTOLIC BLOOD PRESSURE: 147 MMHG | RESPIRATION RATE: 14 BRPM | OXYGEN SATURATION: 97 % | HEIGHT: 64 IN | TEMPERATURE: 97.2 F

## 2020-08-27 LAB
ANION GAP SERPL CALCULATED.3IONS-SCNC: 11 MMOL/L (ref 7–16)
BUN BLDV-MCNC: 17 MG/DL (ref 8–23)
CALCIUM SERPL-MCNC: 9.5 MG/DL (ref 8.6–10.2)
CHLORIDE BLD-SCNC: 99 MMOL/L (ref 98–107)
CO2: 26 MMOL/L (ref 22–29)
CREAT SERPL-MCNC: 1 MG/DL (ref 0.5–1)
GFR AFRICAN AMERICAN: >60
GFR NON-AFRICAN AMERICAN: 54 ML/MIN/1.73
GLUCOSE BLD-MCNC: 117 MG/DL (ref 74–99)
HCT VFR BLD CALC: 42.6 % (ref 34–48)
HEMOGLOBIN: 13.8 G/DL (ref 11.5–15.5)
MCH RBC QN AUTO: 29.5 PG (ref 26–35)
MCHC RBC AUTO-ENTMCNC: 32.4 % (ref 32–34.5)
MCV RBC AUTO: 91 FL (ref 80–99.9)
PDW BLD-RTO: 13.3 FL (ref 11.5–15)
PLATELET # BLD: 237 E9/L (ref 130–450)
PMV BLD AUTO: 9.7 FL (ref 7–12)
POTASSIUM REFLEX MAGNESIUM: 4.7 MMOL/L (ref 3.5–5)
RBC # BLD: 4.68 E12/L (ref 3.5–5.5)
SODIUM BLD-SCNC: 136 MMOL/L (ref 132–146)
WBC # BLD: 8.8 E9/L (ref 4.5–11.5)

## 2020-08-27 PROCEDURE — 36415 COLL VENOUS BLD VENIPUNCTURE: CPT

## 2020-08-27 PROCEDURE — U0003 INFECTIOUS AGENT DETECTION BY NUCLEIC ACID (DNA OR RNA); SEVERE ACUTE RESPIRATORY SYNDROME CORONAVIRUS 2 (SARS-COV-2) (CORONAVIRUS DISEASE [COVID-19]), AMPLIFIED PROBE TECHNIQUE, MAKING USE OF HIGH THROUGHPUT TECHNOLOGIES AS DESCRIBED BY CMS-2020-01-R: HCPCS

## 2020-08-27 PROCEDURE — 85027 COMPLETE CBC AUTOMATED: CPT

## 2020-08-27 PROCEDURE — 87081 CULTURE SCREEN ONLY: CPT

## 2020-08-27 PROCEDURE — 80048 BASIC METABOLIC PNL TOTAL CA: CPT

## 2020-08-27 NOTE — PROGRESS NOTES
[address]                                                                                                                     PRE OP INSTRUCTIONS FOR  Cintia Rojas        Date: 8/27/2020    Date and time of surgery: 9/1/20   Arrival Time: [facility] will call with time between 4:30-5:00     1. Do not eat or drink anything after 12 midnight prior to surgery. This includes no water, chewing gum, mints or ice chips. 2. Take the following pills with a small sip of water on the morning of Surgery: Sotalol and levothyroxine     3. Diabetics may take evening dose of insulin but none after midnight. If you feel symptomatic or low blood sugar take 1-2 ounces of apple juice only. 4. Aspirin, Ibuprofen, Advil, Naproxen, Vitamin E and other Anti-inflammatory products should be stopped  before surgery  as directed by your physician. 5. Check with your Doctor regarding stopping Plavix, Coumadin, Lovenox, Fragmin or other blood thinners. 6. Do not smoke,use illicit drugs and do not drink any alcoholic beverages 24 hours prior to surgery. 7. You may brush your teeth and gargle the morning of surgery. DO NOT SWALLOW WATER    8. You MUST make arrangements for a responsible adult to take you home after your surgery. You will not be allowed to leave alone or drive yourself home. It is strongly suggested someone stay with you the first 24 hrs. Your surgery will be cancelled if you do not have a ride home. 9. A parent/legal guardian must accompany a child scheduled for surgery and plan to stay at the hospital until the child is discharged. Please do not bring other children with you. 10. Please wear simple, loose fitting clothing to the hospital.  Dipak Durant not bring valuables (money, credit cards, checkbooks, etc.) Do not wear any makeup (including no eye makeup) or nail polish on your fingers or toes. 11. DO NOT wear any jewelry or piercings on day of surgery.   All body piercing jewelry must be removed. 12. Shower the night before surgery with _X__Antibacterial soap _X__Hibiclens. 15. Remember to bring Blood Bank bracelet to the hospital on the day of surgery. 14. If you have a Living Will and Durable Power of  for Healthcare, please bring in a copy. 15. If appropriate bring crutches, inspirex, etc... 12. Notify your Surgeon if you develop any illness between now and surgery time, cough, cold, fever, sore throat, nausea, vomiting, etc.  Please notify your surgeon if you experience dizziness, shortness of breath or blurred vision between now & the time of your surgery. 17. If you have ___dentures, they will be removed before going to the OR; we will provide you a container. If you wear ___contact lenses or _X__glasses, they will be removed; please bring a case for them. 18. To provide excellent care visitors will be limited to one in the room at any given time. 19. Please bring picture ID and insurance card. 20. Sleep apnea patients need to bring CPAP to hospital on day of surgery. 21. Visit our web site for additional information: [email or web address]_[email or web address]    22. During flu season no children under the age of 15 are permitted in the hospital for the safety of all patients. 23. Other Come in through main lobby, go to information desk. Please call pre admission testing if you have any further questions.    [address] [facility]     [address]

## 2020-08-29 LAB
MRSA CULTURE ONLY: NORMAL
SARS-COV-2: NOT DETECTED
SOURCE: NORMAL

## 2020-08-31 ENCOUNTER — ANESTHESIA EVENT (OUTPATIENT)
Dept: OPERATING ROOM | Age: 75
End: 2020-08-31
Payer: MEDICARE

## 2020-09-01 ENCOUNTER — ANESTHESIA (OUTPATIENT)
Dept: OPERATING ROOM | Age: 75
End: 2020-09-01
Payer: MEDICARE

## 2020-09-01 ENCOUNTER — HOSPITAL ENCOUNTER (OUTPATIENT)
Age: 75
Setting detail: OBSERVATION
Discharge: HOME | End: 2020-09-03
Attending: SURGERY | Admitting: SURGERY
Payer: MEDICARE

## 2020-09-01 VITALS
OXYGEN SATURATION: 99 % | SYSTOLIC BLOOD PRESSURE: 182 MMHG | RESPIRATION RATE: 29 BRPM | DIASTOLIC BLOOD PRESSURE: 113 MMHG | TEMPERATURE: 96.8 F

## 2020-09-01 PROBLEM — K94.13 ILEOSTOMY DYSFUNCTION (HCC): Status: ACTIVE | Noted: 2020-09-01

## 2020-09-01 LAB — METER GLUCOSE: 141 MG/DL (ref 74–99)

## 2020-09-01 PROCEDURE — 2580000003 HC RX 258: Performed by: SURGERY

## 2020-09-01 PROCEDURE — 82962 GLUCOSE BLOOD TEST: CPT

## 2020-09-01 PROCEDURE — G0378 HOSPITAL OBSERVATION PER HR: HCPCS

## 2020-09-01 PROCEDURE — 3700000000 HC ANESTHESIA ATTENDED CARE: Performed by: SURGERY

## 2020-09-01 PROCEDURE — 7100000000 HC PACU RECOVERY - FIRST 15 MIN: Performed by: SURGERY

## 2020-09-01 PROCEDURE — 6360000002 HC RX W HCPCS

## 2020-09-01 PROCEDURE — 2500000003 HC RX 250 WO HCPCS: Performed by: NURSE ANESTHETIST, CERTIFIED REGISTERED

## 2020-09-01 PROCEDURE — 2500000003 HC RX 250 WO HCPCS: Performed by: SURGERY

## 2020-09-01 PROCEDURE — 6360000002 HC RX W HCPCS: Performed by: SURGERY

## 2020-09-01 PROCEDURE — 2720000010 HC SURG SUPPLY STERILE: Performed by: SURGERY

## 2020-09-01 PROCEDURE — 7100000001 HC PACU RECOVERY - ADDTL 15 MIN: Performed by: SURGERY

## 2020-09-01 PROCEDURE — 3700000001 HC ADD 15 MINUTES (ANESTHESIA): Performed by: SURGERY

## 2020-09-01 PROCEDURE — 2709999900 HC NON-CHARGEABLE SUPPLY: Performed by: SURGERY

## 2020-09-01 PROCEDURE — 6370000000 HC RX 637 (ALT 250 FOR IP): Performed by: SURGERY

## 2020-09-01 PROCEDURE — 6370000000 HC RX 637 (ALT 250 FOR IP): Performed by: INTERNAL MEDICINE

## 2020-09-01 PROCEDURE — 3600000004 HC SURGERY LEVEL 4 BASE: Performed by: SURGERY

## 2020-09-01 PROCEDURE — 6360000002 HC RX W HCPCS: Performed by: NURSE ANESTHETIST, CERTIFIED REGISTERED

## 2020-09-01 PROCEDURE — 3600000014 HC SURGERY LEVEL 4 ADDTL 15MIN: Performed by: SURGERY

## 2020-09-01 PROCEDURE — 6360000002 HC RX W HCPCS: Performed by: ANESTHESIOLOGY

## 2020-09-01 PROCEDURE — 88304 TISSUE EXAM BY PATHOLOGIST: CPT

## 2020-09-01 RX ORDER — SODIUM CHLORIDE 0.9 % (FLUSH) 0.9 %
10 SYRINGE (ML) INJECTION PRN
Status: DISCONTINUED | OUTPATIENT
Start: 2020-09-01 | End: 2020-09-03 | Stop reason: HOSPADM

## 2020-09-01 RX ORDER — SODIUM CHLORIDE, SODIUM LACTATE, POTASSIUM CHLORIDE, CALCIUM CHLORIDE 600; 310; 30; 20 MG/100ML; MG/100ML; MG/100ML; MG/100ML
INJECTION, SOLUTION INTRAVENOUS CONTINUOUS
Status: DISCONTINUED | OUTPATIENT
Start: 2020-09-01 | End: 2020-09-02

## 2020-09-01 RX ORDER — OXYCODONE HYDROCHLORIDE 5 MG/1
10 TABLET ORAL EVERY 4 HOURS PRN
Status: DISCONTINUED | OUTPATIENT
Start: 2020-09-01 | End: 2020-09-03

## 2020-09-01 RX ORDER — PROMETHAZINE HYDROCHLORIDE 25 MG/ML
25 INJECTION, SOLUTION INTRAMUSCULAR; INTRAVENOUS PRN
Status: DISCONTINUED | OUTPATIENT
Start: 2020-09-01 | End: 2020-09-01 | Stop reason: HOSPADM

## 2020-09-01 RX ORDER — SODIUM CHLORIDE 0.9 % (FLUSH) 0.9 %
10 SYRINGE (ML) INJECTION EVERY 12 HOURS SCHEDULED
Status: DISCONTINUED | OUTPATIENT
Start: 2020-09-01 | End: 2020-09-03 | Stop reason: HOSPADM

## 2020-09-01 RX ORDER — ACETAMINOPHEN 325 MG/1
650 TABLET ORAL EVERY 4 HOURS PRN
Status: DISCONTINUED | OUTPATIENT
Start: 2020-09-01 | End: 2020-09-03 | Stop reason: HOSPADM

## 2020-09-01 RX ORDER — FAMOTIDINE 20 MG/1
20 TABLET, FILM COATED ORAL DAILY
Status: DISCONTINUED | OUTPATIENT
Start: 2020-09-01 | End: 2020-09-03 | Stop reason: HOSPADM

## 2020-09-01 RX ORDER — CITALOPRAM 20 MG/1
10 TABLET ORAL NIGHTLY
Status: DISCONTINUED | OUTPATIENT
Start: 2020-09-01 | End: 2020-09-03 | Stop reason: HOSPADM

## 2020-09-01 RX ORDER — SODIUM CHLORIDE, SODIUM LACTATE, POTASSIUM CHLORIDE, CALCIUM CHLORIDE 600; 310; 30; 20 MG/100ML; MG/100ML; MG/100ML; MG/100ML
INJECTION, SOLUTION INTRAVENOUS CONTINUOUS
Status: DISCONTINUED | OUTPATIENT
Start: 2020-09-01 | End: 2020-09-01

## 2020-09-01 RX ORDER — BIOTIN 5 MG
1 TABLET ORAL DAILY
Status: DISCONTINUED | OUTPATIENT
Start: 2020-09-01 | End: 2020-09-01 | Stop reason: RX

## 2020-09-01 RX ORDER — PROPOFOL 10 MG/ML
INJECTION, EMULSION INTRAVENOUS PRN
Status: DISCONTINUED | OUTPATIENT
Start: 2020-09-01 | End: 2020-09-01 | Stop reason: SDUPTHER

## 2020-09-01 RX ORDER — ASPIRIN 81 MG/1
81 TABLET ORAL DAILY
Status: DISCONTINUED | OUTPATIENT
Start: 2020-09-01 | End: 2020-09-03 | Stop reason: HOSPADM

## 2020-09-01 RX ORDER — LIDOCAINE HYDROCHLORIDE 20 MG/ML
INJECTION, SOLUTION INTRAVENOUS PRN
Status: DISCONTINUED | OUTPATIENT
Start: 2020-09-01 | End: 2020-09-01 | Stop reason: SDUPTHER

## 2020-09-01 RX ORDER — ONDANSETRON 2 MG/ML
4 INJECTION INTRAMUSCULAR; INTRAVENOUS EVERY 6 HOURS PRN
Status: DISCONTINUED | OUTPATIENT
Start: 2020-09-01 | End: 2020-09-03 | Stop reason: HOSPADM

## 2020-09-01 RX ORDER — METFORMIN HYDROCHLORIDE 500 MG/1
500 TABLET, EXTENDED RELEASE ORAL 2 TIMES DAILY WITH MEALS
Status: DISCONTINUED | OUTPATIENT
Start: 2020-09-01 | End: 2020-09-03 | Stop reason: HOSPADM

## 2020-09-01 RX ORDER — GLYCOPYRROLATE 1 MG/5 ML
SYRINGE (ML) INTRAVENOUS PRN
Status: DISCONTINUED | OUTPATIENT
Start: 2020-09-01 | End: 2020-09-01 | Stop reason: SDUPTHER

## 2020-09-01 RX ORDER — ROCURONIUM BROMIDE 10 MG/ML
INJECTION, SOLUTION INTRAVENOUS PRN
Status: DISCONTINUED | OUTPATIENT
Start: 2020-09-01 | End: 2020-09-01 | Stop reason: SDUPTHER

## 2020-09-01 RX ORDER — SODIUM CHLORIDE 0.9 % (FLUSH) 0.9 %
10 SYRINGE (ML) INJECTION EVERY 12 HOURS SCHEDULED
Status: DISCONTINUED | OUTPATIENT
Start: 2020-09-01 | End: 2020-09-01 | Stop reason: HOSPADM

## 2020-09-01 RX ORDER — LOSARTAN POTASSIUM AND HYDROCHLOROTHIAZIDE 12.5; 5 MG/1; MG/1
1 TABLET ORAL DAILY
Status: DISCONTINUED | OUTPATIENT
Start: 2020-09-01 | End: 2020-09-01 | Stop reason: SDUPTHER

## 2020-09-01 RX ORDER — HYDROCHLOROTHIAZIDE 12.5 MG/1
12.5 TABLET ORAL DAILY
Status: DISCONTINUED | OUTPATIENT
Start: 2020-09-01 | End: 2020-09-03 | Stop reason: HOSPADM

## 2020-09-01 RX ORDER — MORPHINE SULFATE 2 MG/ML
2 INJECTION, SOLUTION INTRAMUSCULAR; INTRAVENOUS
Status: DISCONTINUED | OUTPATIENT
Start: 2020-09-01 | End: 2020-09-03

## 2020-09-01 RX ORDER — ONDANSETRON 2 MG/ML
INJECTION INTRAMUSCULAR; INTRAVENOUS PRN
Status: DISCONTINUED | OUTPATIENT
Start: 2020-09-01 | End: 2020-09-01 | Stop reason: SDUPTHER

## 2020-09-01 RX ORDER — MEPERIDINE HYDROCHLORIDE 25 MG/ML
12.5 INJECTION INTRAMUSCULAR; INTRAVENOUS; SUBCUTANEOUS EVERY 5 MIN PRN
Status: DISCONTINUED | OUTPATIENT
Start: 2020-09-01 | End: 2020-09-01 | Stop reason: HOSPADM

## 2020-09-01 RX ORDER — DEXAMETHASONE SODIUM PHOSPHATE 10 MG/ML
INJECTION, SOLUTION INTRAMUSCULAR; INTRAVENOUS PRN
Status: DISCONTINUED | OUTPATIENT
Start: 2020-09-01 | End: 2020-09-01 | Stop reason: SDUPTHER

## 2020-09-01 RX ORDER — NEOSTIGMINE METHYLSULFATE 1 MG/ML
INJECTION, SOLUTION INTRAVENOUS PRN
Status: DISCONTINUED | OUTPATIENT
Start: 2020-09-01 | End: 2020-09-01 | Stop reason: SDUPTHER

## 2020-09-01 RX ORDER — OXYCODONE HYDROCHLORIDE 5 MG/1
5 TABLET ORAL EVERY 4 HOURS PRN
Status: DISCONTINUED | OUTPATIENT
Start: 2020-09-01 | End: 2020-09-03 | Stop reason: HOSPADM

## 2020-09-01 RX ORDER — SOTALOL HYDROCHLORIDE 80 MG/1
120 TABLET ORAL 2 TIMES DAILY
Status: DISCONTINUED | OUTPATIENT
Start: 2020-09-01 | End: 2020-09-03 | Stop reason: HOSPADM

## 2020-09-01 RX ORDER — LOSARTAN POTASSIUM 50 MG/1
50 TABLET ORAL DAILY
Status: DISCONTINUED | OUTPATIENT
Start: 2020-09-01 | End: 2020-09-03 | Stop reason: HOSPADM

## 2020-09-01 RX ORDER — FENTANYL CITRATE 50 UG/ML
INJECTION, SOLUTION INTRAMUSCULAR; INTRAVENOUS PRN
Status: DISCONTINUED | OUTPATIENT
Start: 2020-09-01 | End: 2020-09-01 | Stop reason: SDUPTHER

## 2020-09-01 RX ORDER — LABETALOL HYDROCHLORIDE 5 MG/ML
5 INJECTION, SOLUTION INTRAVENOUS EVERY 10 MIN PRN
Status: DISCONTINUED | OUTPATIENT
Start: 2020-09-01 | End: 2020-09-01 | Stop reason: HOSPADM

## 2020-09-01 RX ORDER — SODIUM CHLORIDE 0.9 % (FLUSH) 0.9 %
10 SYRINGE (ML) INJECTION PRN
Status: DISCONTINUED | OUTPATIENT
Start: 2020-09-01 | End: 2020-09-01 | Stop reason: HOSPADM

## 2020-09-01 RX ORDER — ONDANSETRON 4 MG/1
4 TABLET, ORALLY DISINTEGRATING ORAL EVERY 8 HOURS PRN
Status: DISCONTINUED | OUTPATIENT
Start: 2020-09-01 | End: 2020-09-03 | Stop reason: HOSPADM

## 2020-09-01 RX ORDER — BUPIVACAINE HYDROCHLORIDE AND EPINEPHRINE 2.5; 5 MG/ML; UG/ML
INJECTION, SOLUTION EPIDURAL; INFILTRATION; INTRACAUDAL; PERINEURAL PRN
Status: DISCONTINUED | OUTPATIENT
Start: 2020-09-01 | End: 2020-09-01 | Stop reason: HOSPADM

## 2020-09-01 RX ORDER — LEVOTHYROXINE SODIUM 0.05 MG/1
50 TABLET ORAL NIGHTLY
Status: DISCONTINUED | OUTPATIENT
Start: 2020-09-01 | End: 2020-09-03 | Stop reason: HOSPADM

## 2020-09-01 RX ADMIN — OXYCODONE HYDROCHLORIDE 10 MG: 5 TABLET ORAL at 11:00

## 2020-09-01 RX ADMIN — FENTANYL CITRATE 100 MCG: 50 INJECTION, SOLUTION INTRAMUSCULAR; INTRAVENOUS at 07:43

## 2020-09-01 RX ADMIN — LIDOCAINE HYDROCHLORIDE 100 MG: 20 INJECTION, SOLUTION INTRAVENOUS at 07:43

## 2020-09-01 RX ADMIN — Medication 0.6 MG: at 08:47

## 2020-09-01 RX ADMIN — SOTALOL HYDROCHLORIDE 120 MG: 80 TABLET ORAL at 21:49

## 2020-09-01 RX ADMIN — Medication 3 MG: at 08:47

## 2020-09-01 RX ADMIN — MORPHINE SULFATE 2 MG: 2 INJECTION, SOLUTION INTRAMUSCULAR; INTRAVENOUS at 18:56

## 2020-09-01 RX ADMIN — ASPIRIN 81 MG: 81 TABLET, COATED ORAL at 11:32

## 2020-09-01 RX ADMIN — SODIUM CHLORIDE, POTASSIUM CHLORIDE, SODIUM LACTATE AND CALCIUM CHLORIDE: 600; 310; 30; 20 INJECTION, SOLUTION INTRAVENOUS at 10:39

## 2020-09-01 RX ADMIN — MORPHINE SULFATE 2 MG: 2 INJECTION, SOLUTION INTRAMUSCULAR; INTRAVENOUS at 13:46

## 2020-09-01 RX ADMIN — SODIUM CHLORIDE, POTASSIUM CHLORIDE, SODIUM LACTATE AND CALCIUM CHLORIDE: 600; 310; 30; 20 INJECTION, SOLUTION INTRAVENOUS at 07:09

## 2020-09-01 RX ADMIN — LOSARTAN POTASSIUM 50 MG: 50 TABLET, FILM COATED ORAL at 11:32

## 2020-09-01 RX ADMIN — ONDANSETRON 4 MG: 2 INJECTION INTRAMUSCULAR; INTRAVENOUS at 08:38

## 2020-09-01 RX ADMIN — HYDROMORPHONE HYDROCHLORIDE 0.5 MG: 1 INJECTION, SOLUTION INTRAMUSCULAR; INTRAVENOUS; SUBCUTANEOUS at 09:15

## 2020-09-01 RX ADMIN — HYDROCHLOROTHIAZIDE 12.5 MG: 12.5 TABLET ORAL at 11:31

## 2020-09-01 RX ADMIN — FENTANYL CITRATE 50 MCG: 50 INJECTION, SOLUTION INTRAMUSCULAR; INTRAVENOUS at 08:22

## 2020-09-01 RX ADMIN — ROCURONIUM BROMIDE 30 MG: 10 SOLUTION INTRAVENOUS at 07:43

## 2020-09-01 RX ADMIN — ONDANSETRON 4 MG: 2 INJECTION INTRAMUSCULAR; INTRAVENOUS at 17:04

## 2020-09-01 RX ADMIN — PROPOFOL 180 MG: 10 INJECTION, EMULSION INTRAVENOUS at 07:43

## 2020-09-01 RX ADMIN — ENOXAPARIN SODIUM 40 MG: 40 INJECTION SUBCUTANEOUS at 11:00

## 2020-09-01 RX ADMIN — CEFEPIME 2 G: 2 INJECTION, POWDER, FOR SOLUTION INTRAVENOUS at 07:39

## 2020-09-01 RX ADMIN — SODIUM CHLORIDE, POTASSIUM CHLORIDE, SODIUM LACTATE AND CALCIUM CHLORIDE: 600; 310; 30; 20 INJECTION, SOLUTION INTRAVENOUS at 07:39

## 2020-09-01 RX ADMIN — SODIUM CHLORIDE, POTASSIUM CHLORIDE, SODIUM LACTATE AND CALCIUM CHLORIDE: 600; 310; 30; 20 INJECTION, SOLUTION INTRAVENOUS at 18:54

## 2020-09-01 RX ADMIN — DEXAMETHASONE SODIUM PHOSPHATE 10 MG: 10 INJECTION, SOLUTION INTRAMUSCULAR; INTRAVENOUS at 07:50

## 2020-09-01 RX ADMIN — LEVOTHYROXINE SODIUM 50 MCG: 50 TABLET ORAL at 21:49

## 2020-09-01 RX ADMIN — CITALOPRAM HYDROBROMIDE 10 MG: 20 TABLET ORAL at 21:50

## 2020-09-01 RX ADMIN — HYDROMORPHONE HYDROCHLORIDE 0.5 MG: 1 INJECTION, SOLUTION INTRAMUSCULAR; INTRAVENOUS; SUBCUTANEOUS at 09:37

## 2020-09-01 RX ADMIN — OXYCODONE HYDROCHLORIDE 10 MG: 5 TABLET ORAL at 15:15

## 2020-09-01 RX ADMIN — FAMOTIDINE 20 MG: 20 TABLET ORAL at 11:00

## 2020-09-01 ASSESSMENT — PULMONARY FUNCTION TESTS
PIF_VALUE: 24
PIF_VALUE: 25
PIF_VALUE: 26
PIF_VALUE: 0
PIF_VALUE: 0
PIF_VALUE: 26
PIF_VALUE: 27
PIF_VALUE: 25
PIF_VALUE: 2
PIF_VALUE: 26
PIF_VALUE: 26
PIF_VALUE: 25
PIF_VALUE: 25
PIF_VALUE: 0
PIF_VALUE: 25
PIF_VALUE: 26
PIF_VALUE: 29
PIF_VALUE: 25
PIF_VALUE: 28
PIF_VALUE: 25
PIF_VALUE: 24
PIF_VALUE: 0
PIF_VALUE: 2
PIF_VALUE: 1
PIF_VALUE: 25
PIF_VALUE: 20
PIF_VALUE: 28
PIF_VALUE: 26
PIF_VALUE: 27
PIF_VALUE: 35
PIF_VALUE: 26
PIF_VALUE: 24
PIF_VALUE: 26
PIF_VALUE: 26
PIF_VALUE: 27
PIF_VALUE: 42
PIF_VALUE: 28
PIF_VALUE: 25
PIF_VALUE: 26
PIF_VALUE: 21
PIF_VALUE: 24
PIF_VALUE: 27
PIF_VALUE: 25
PIF_VALUE: 25
PIF_VALUE: 26
PIF_VALUE: 28
PIF_VALUE: 26
PIF_VALUE: 27
PIF_VALUE: 26
PIF_VALUE: 28
PIF_VALUE: 28
PIF_VALUE: 25
PIF_VALUE: 28
PIF_VALUE: 25
PIF_VALUE: 26
PIF_VALUE: 26
PIF_VALUE: 25
PIF_VALUE: 27
PIF_VALUE: 25
PIF_VALUE: 25
PIF_VALUE: 26
PIF_VALUE: 25
PIF_VALUE: 25
PIF_VALUE: 26
PIF_VALUE: 25
PIF_VALUE: 26
PIF_VALUE: 36
PIF_VALUE: 25
PIF_VALUE: 26
PIF_VALUE: 26
PIF_VALUE: 35
PIF_VALUE: 26
PIF_VALUE: 21
PIF_VALUE: 0
PIF_VALUE: 27
PIF_VALUE: 25
PIF_VALUE: 27

## 2020-09-01 ASSESSMENT — PAIN DESCRIPTION - DESCRIPTORS: DESCRIPTORS: ACHING;DISCOMFORT;SHARP;SHOOTING

## 2020-09-01 ASSESSMENT — PAIN SCALES - GENERAL
PAINLEVEL_OUTOF10: 9
PAINLEVEL_OUTOF10: 9
PAINLEVEL_OUTOF10: 8
PAINLEVEL_OUTOF10: 7
PAINLEVEL_OUTOF10: 8
PAINLEVEL_OUTOF10: 7
PAINLEVEL_OUTOF10: 9
PAINLEVEL_OUTOF10: 7

## 2020-09-01 ASSESSMENT — PAIN DESCRIPTION - ONSET: ONSET: ON-GOING

## 2020-09-01 ASSESSMENT — PAIN DESCRIPTION - LOCATION: LOCATION: ABDOMEN

## 2020-09-01 ASSESSMENT — PAIN DESCRIPTION - FREQUENCY: FREQUENCY: CONTINUOUS

## 2020-09-01 ASSESSMENT — PAIN DESCRIPTION - ORIENTATION: ORIENTATION: RIGHT;MID

## 2020-09-01 ASSESSMENT — PAIN - FUNCTIONAL ASSESSMENT
PAIN_FUNCTIONAL_ASSESSMENT: 0-10
PAIN_FUNCTIONAL_ASSESSMENT: PREVENTS OR INTERFERES SOME ACTIVE ACTIVITIES AND ADLS

## 2020-09-01 ASSESSMENT — PAIN DESCRIPTION - PROGRESSION: CLINICAL_PROGRESSION: NOT CHANGED

## 2020-09-01 ASSESSMENT — PAIN DESCRIPTION - PAIN TYPE: TYPE: SURGICAL PAIN

## 2020-09-01 NOTE — H&P
7/21/2020    PARASTOMAL HERNIA REPAIR WITH MESH, LAPAROSCOPIC ROBOTIC XI ASSISTED performed by Zain Willoughby MD at [address] Right     [address] OTHER SURGICAL HISTORY Right 05/09/2017    right TKA    MS COLONOSCOPY FLX DX W/COLLJ SPEC WHEN PFRMD N/A 6/26/2018    COLONOSCOPY performed by Marce Tavares MD at [facility] LAP,SURG,COLECT,TOT,W/PROCTECT,W/ILEOST N/A 7/2/2018    LAPAROSCOPIC POSS OPEN SUBTOTAL COLECTOMY POSS ILEOSTOMY performed by Zain Willoughby MD at [address]  1/22/2019    SIGMOIDOSCOPY FLEXIBLE performed by Zain Willoughby MD at [address] N/A 7/23/2020    LAPAROSCOPIC REPAIR OF PARASTOMAL HERNIA WITH REVISION OF ILEOSTOMY performed by Zain Willoughby MD at [facility] Left 6/11/2019    LEFT TOTAL KNEE ARTHROPLASTY (LUIS ENRIQUE) performed by Cheryl Raymond DO at [address]         Allergies   Allergen Reactions    Sulfa Antibiotics Nausea Only    Talwin [Pentazocine] Other (See Comments)     Shaky and sweaty    Tetracyclines & Related Dermatitis    Bactrim [Sulfamethoxazole-Trimethoprim] Rash       The patient has a family history that is negative for severe cardiovascular or respiratory issues, negative for reaction to anesthesia. Time spent reviewing past medical, surgical, social and family history, vitals, nursing assessment and images. No changes from above documented history.     Social History     Socioeconomic History    Marital status:      Spouse name: Not on file    Number of children: Not on file    Years of education: Not on file    Highest education level: Not on file   Occupational History    Not on file   Social Needs    Financial resource strain: Not on file    Food insecurity     Worry: Not on file     Inability: Not on file    Transportation needs     Medical: Not on file     Non-medical: Not on file Tobacco Use    Smoking status: Never Smoker    Smokeless tobacco: Never Used   Substance and Sexual Activity    Alcohol use: Yes     Comment: rarely     Drug use: No    Sexual activity: Not Currently   Lifestyle    Physical activity     Days per week: Not on file     Minutes per session: Not on file    Stress: Not on file   Relationships    Social connections     Talks on phone: Not on file     Gets together: Not on file     Attends Qu[employer] service: Not on file     Active member of club or organization: Not on file     Attends meetings of clubs or organizations: Not on file     Relationship status: Not on file    Intimate partner violence     Fear of current or ex partner: Not on file     Emotionally abused: Not on file     Physically abused: Not on file     Forced sexual activity: Not on file   Other Topics Concern    Not on file   Social History Narrative    Not on file       I have reviewed relevant labs from this admission and interpretation is included in my assessment and plan    Review of Systems    A complete 10 system review was performed and are otherwise negative unless mentioned in the above HPI. Specific negatives are listed below but may not include all those reviewed.     General ROS: negative obtundation, AMS  ENT ROS: negative rhinorrhea, epistaxis  Allergy and Immunology ROS: negative itchy/watery eyes or nasal congestion  Hematological and Lymphatic ROS: negative spontaneous bleeding or bruising  Endocrine ROS: negative  lethargy, mood swings, palpitations or polydipsia/polyuria  Respiratory ROS: negative sputum changes, stridor, tachypnea or wheezing  Cardiovascular ROS: negative for - loss of consciousness, murmur or orthopnea  Gastrointestinal ROS: negative for - hematochezia or hematemesis  Genito-Urinary ROS: negative for -  genital discharge or hematuria  Musculoskeletal ROS: negative for - focal weakness, gangrene  Psych/Neuro ROS: negative for - visual or auditory hallucinations, suicidal ideation    Physical exam:   /81   Pulse 61   Temp 97.5 °F (36.4 °C) (Temporal)   Resp 16   Ht 5' 3.5\" (1.613 m)   Wt 236 lb (107 kg)   SpO2 97%   BMI 41.15 kg/m²   General appearance:  NAD, appears stated age  Head: NCAT, PERRLA, EOMI, red conjunctiva  Neck: supple, no masses, trachea midline  Lungs: Equal chest rise bilateral, no retractions, no wheezing  Heart: Reg rate  Abdomen: soft, tender around stoma LLQ, well healled incision RLQ, nondistended  Skin; warm and dry, no cyanosis  Gu: no cva tenderness  Extremities: atraumatic, no focal motor deficits, no open wounds  Psych: No tremor, visual hallucinations      Radiology: I reviewed relevant abdominal imaging from this admission and that available in the EMR     Assessment:  Anant Lyon is a 77 y.o. 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 She agrees. The patient was counseled at length about the risks of nii Covid-19 during their perioperative period and any recovery window from their procedure. The patient was made aware that nii Covid-19  may worsen their prognosis for recovering from their procedure  and lend to a higher morbidity and/or mortality risk.   All material risks, benefits, and reasonable alternatives including postponing the procedure were discussed. The patient does wish to proceed with the procedure at this time.               Chinyere Monae MD  7:32 AM  9/1/2020

## 2020-09-01 NOTE — ANESTHESIA PRE PROCEDURE
Department of Anesthesiology  Preprocedure Note       Name:  Emil Muir   Age:  77 y.o.  :  1945                                          MRN:  [id number]         Date:  2020      Surgeon: Bernie Raman):  Tamia Cavanaugh MD    Procedure: ILEOSTOMY REVISION, LAPAROSCOPIC, POSS ROBOT XI ASSISTED (CPT 17605) (N/A )    Medications prior to admission:   Prior to Admission medications    Medication Sig Start Date End Date Taking? Authorizing Provider   nystatin (MYCOSTATIN) 401947 UNIT/GM cream Apply topically 2 times daily TO SURROUNDING AREA OF STOMA 20   Tamia Cavanaugh MD   ondansetron (ZOFRAN-ODT) 4 MG disintegrating tablet Take 1 tablet by mouth every 8 hours as needed for Nausea or Vomiting 20   Tamia Cavanaugh MD   TURMERIC PO Take by mouth daily    Historical Provider, MD   NONFORMULARY Natural supplement/anti inflammatory    Historical Provider, MD   metFORMIN (GLUCOPHAGE-XR) 500 MG extended release tablet TAKE ONE TABLET BY MOUTH TWO TIMES A DAY AFTER MEALS 3/11/20   Historical Provider, MD   aspirin 81 MG EC tablet Take 81 mg by mouth daily    Historical Provider, MD   Citalopram Hydrobromide (CELEXA PO) Take 10 mg by mouth nightly     Historical Provider, MD   sotalol (BETAPACE) 80 MG tablet Take 1 tablet by mouth 2 times daily  Patient taking differently: Take 80 mg by mouth 2 times daily Pt takes 1.5 tablets twice daily 18   Mitch Larios MD   Misc Natural Products (GLUCOSAMINE CHONDROITIN ADV PO) Take 1 tablet by mouth daily    Historical Provider, MD   losartan-hydrochlorothiazide (HYZAAR) 50-12.5 MG per tablet Take 1 tablet by mouth daily    Historical Provider, MD   vitamin D-3 (CHOLECALCIFEROL) 5000 UNITS TABS Take 5,000 Units by mouth daily    Historical Provider, MD   levothyroxine (SYNTHROID) 50 MCG tablet Take 50 mcg by mouth nightly     Historical Provider, MD       Current medications:    No current facility-administered medications for this visit.       No current outpatient medications on file. Facility-Administered Medications Ordered in Other Visits   Medication Dose Route Frequency Provider Last Rate Last Dose    lactated ringers infusion   Intravenous Continuous Ady Hand  mL/hr at 09/01/20 0709      sodium chloride flush 0.9 % injection 10 mL  10 mL Intravenous 2 times per day Ady Hand MD        sodium chloride flush 0.9 % injection 10 mL  10 mL Intravenous PRN Ady Hand MD        cefepime (MAXIPIME) 2 g IVPB extended (mini-bag)  2 g Intravenous On Call to Audie Littlejohn MD           Allergies:     Allergies   Allergen Reactions    Sulfa Antibiotics Nausea Only    Talwin [Pentazocine] Other (See Comments)     Shaky and sweaty    Tetracyclines & Related Dermatitis    Bactrim [Sulfamethoxazole-Trimethoprim] Rash       Problem List:    Patient Active Problem List   Diagnosis Code    Class 2 obesity in adult E66.9    Lower GI bleed K92.2    Diverticulitis of intestine with bleeding K57.93    Anemia due to blood loss, acute D62    Essential hypertension I10    Acquired hypothyroidism E03.9    Osteoarthritis of multiple joints M15.9    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 K57.80    Paroxysmal atrial fibrillation (HCC) I48.0    Arthritis of knee, right M17.11    Arthritis of knee, left M17.12    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 K43.3    Malfunction of colostomy stoma ([address] K94.03    Small bowel obstruction ([address] K56.609    SBO (small bowel obstruction) ([address] K56.609       Past Medical History:        Diagnosis Date    Arthritis     History of blood transfusion     History of cardiovascular stress test 6/2015    lexiscan    Hypertension     Sciatica     Right    Spinal headache     Tachycardia     on metoprolol ,paroxysmal, usually at HS    Thyroid disease        Past Surgical History:        Procedure Laterality Date    COLONOSCOPY      COLONOSCOPY N/A 6/27/2018    COLONOSCOPY CONTROL HEMORRHAGE performed by Yolanda Mcintosh MD at [address] [facility], COLON, DIAGNOSTIC      HEEL SPUR SURGERY      HERNIA REPAIR N/A 7/21/2020    PARASTOMAL HERNIA REPAIR WITH MESH, LAPAROSCOPIC ROBOTIC XI ASSISTED performed by Mohsen Beltran MD at [address] Right     [address]      OTHER SURGICAL HISTORY Right 05/09/2017    right TKA    [place] COLONOSCOPY FLX DX W/COLLJ SPEC WHEN PFRMD N/A 6/26/2018    COLONOSCOPY performed by Harjeet Anderson MD at [address] LAP,SURG,COLECT,TOT,W/PROCTECT,W/ILEOST N/A 7/2/2018    LAPAROSCOPIC POSS OPEN SUBTOTAL COLECTOMY POSS ILEOSTOMY performed by Mohsen Beltran MD at [address] 1/22/2019    SIGMOIDOSCOPY FLEXIBLE performed by Mohsen Beltran MD at [facility] N/A 7/23/2020    LAPAROSCOPIC REPAIR OF PARASTOMAL HERNIA WITH REVISION OF ILEOSTOMY performed by Mohsen Beltran MD at [facility] Left 6/11/2019    LEFT TOTAL KNEE ARTHROPLASTY (LUIS ENRIQUE) performed by Alana Farr DO at [facility] History:    Social History     Tobacco Use    Smoking status: Never Smoker    Smokeless tobacco: Never Used   Substance Use Topics    Alcohol use: Yes     Comment: rarely                                 Counseling given: Not Answered      Vital Signs (Current): There were no vitals filed for this visit.                                            BP Readings from Last 3 Encounters:   09/01/20 137/81   08/27/20 (!) 147/63   08/10/20 (!) 182/91       NPO Status:                                                                                 BMI:   Wt Readings from Last 3 Encounters:   09/01/20 236 lb (107 kg)   08/27/20 236 lb (107 kg)   08/10/20 237 lb (107.5 kg)     There is no height or weight on file to calculate BMI.    CBC:   Lab Results   Component Value Date    WBC 8.8 08/27/2020 RBC 4.68 08/27/2020    HGB 13.8 08/27/2020    HCT 42.6 08/27/2020    MCV 91.0 08/27/2020    RDW 13.3 08/27/2020     08/27/2020       CMP:   Lab Results   Component Value Date     08/27/2020    K 4.7 08/27/2020    CL 99 08/27/2020    CO2 26 08/27/2020    BUN 17 08/27/2020    CREATININE 1.0 08/27/2020    GFRAA >60 08/27/2020    LABGLOM 54 08/27/2020    GLUCOSE 117 08/27/2020    GLUCOSE 87 03/22/2012    PROT 7.7 07/23/2020    CALCIUM 9.5 08/27/2020    BILITOT 0.4 07/23/2020    ALKPHOS 73 07/23/2020    AST 21 07/23/2020    ALT 16 07/23/2020       POC Tests: No results for input(s): POCGLU, POCNA, POCK, POCCL, POCBUN, POCHEMO, POCHCT in the last 72 hours. Coags:   Lab Results   Component Value Date    PROTIME 11.8 06/04/2019    INR 1.1 06/04/2019    APTT 31.9 06/04/2019       HCG (If Applicable): No results found for: PREGTESTUR, PREGSERUM, HCG, HCGQUANT     ABGs: No results found for: PHART, PO2ART, KYI5YPN, ZLB2XEJ, BEART, G7VPPMNO     Type & Screen (If Applicable):  No results found for: [facility]    Anesthesia Evaluation  Patient summary reviewed history of anesthetic complications (Spinal headache): Airway: Mallampati: II  TM distance: >3 FB   Neck ROM: full  Mouth opening: > = 3 FB Dental: normal exam         Pulmonary: breath sounds clear to auscultation      (-) COPD and asthma                          ROS comment: COVID Neg 8/27/2020   Cardiovascular:  Exercise tolerance: good (>4 METS),   (+) hypertension: moderate, dysrhythmias: ventricular tachycardia,     (-) past MI and CAD    ECG reviewed  Rhythm: regular  Rate: normal    Stress test reviewed       Beta Blocker:  Dose within 24 Hrs         Neuro/Psych:   (+) neuromuscular disease:, headaches:,              ROS comment: Spinal Headache from spinal injection more than 10 years ago. 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  2017 GI/Hepatic/Renal:   (+) morbid obesity     (-) liver disease and no renal disease      ROS

## 2020-09-01 NOTE — PROGRESS NOTES
Pharmacy Note    Obey Almendarez was ordered Glucosamine-Chondroitin . Per the [address], this medication is non-formulary and not stocked by pharmacy for the reason indicated below. The medication can be reordered at discharge. Magui Pace, LOUIE. Ph. 4/5/876169:76 AM

## 2020-09-01 NOTE — PROGRESS NOTES
Pharmacy Note    Mark Almendarez was ordered Tumeric Capsules. Per the [address], this medication is non-formulary and not stocked by pharmacy for the reason indicated below. The medication can be reordered at discharge. TOPHER Delgado Ph. 9/1/202010:50 AM

## 2020-09-01 NOTE — BRIEF OP NOTE
Brief Postoperative Note      Patient: Stacie Lopez  YOB: 1945  MRN: [id number]    Date of Procedure: 9/1/2020    Pre-Op Diagnosis: ILEOSTOMY IN PLACE, ILEOSTOMY MALFUNCTION    Post-Op Diagnosis: Same       Procedure(s):  LAPAROSCOPIC REVISION OF ILEOSTOMY    Surgeon(s):  Emily Hook MD    Assistant:  First Assistant: Carson Tay    Anesthesia: General    Estimated Blood Loss (mL): 13JJ    Complications: None    Specimens:   ID Type Source Tests Collected by Time Destination   A : ILEOSTOMY Tissue Colon SURGICAL PATHOLOGY Emily Hook MD 9/1/2020 6343        Implants:  * No implants in log *      Drains:   Colostomy RUQ Loop (Active)       [REMOVED] Colostomy LLQ Loop (Removed)       Findings: see op note  [id number] signed by Emily Hook MD on 9/1/2020 at 8:55 AM

## 2020-09-01 NOTE — ANESTHESIA POSTPROCEDURE EVALUATION
Department of Anesthesiology  Postprocedure Note    Patient: Luis Mina  MRN: [id number]  YOB: 1945  Date of evaluation: 9/1/2020  Time:  4:54 PM     Procedure Summary     Date:  09/01/20 Room / Location:  [address] [place] [address] 06 / [facility]    Anesthesia Start:  9602 Anesthesia Stop:  1436    Procedure:  LAPAROSCOPIC REVISION OF ILEOSTOMY (N/A Abdomen) Diagnosis:  (ILEOSTOMY IN PLACE)    Surgeon:  Raudel Cavazos MD Responsible Provider:  Fredi Mosher MD    Anesthesia Type:  general ASA Status:  3          Anesthesia Type: general    Tyrese Phase I: Tyrese Score: 9    Tyrese Phase II:      Last vitals: Reviewed and per EMR flowsheets.        Anesthesia Post Evaluation    Patient location during evaluation: PACU  Patient participation: complete - patient participated  Level of consciousness: awake  Airway patency: patent  Nausea & Vomiting: no nausea and no vomiting  Complications: no  Cardiovascular status: hemodynamically stable  Respiratory status: acceptable  Hydration status: stable

## 2020-09-02 LAB
ANION GAP SERPL CALCULATED.3IONS-SCNC: 12 MMOL/L (ref 7–16)
BASOPHILS ABSOLUTE: 0.04 E9/L (ref 0–0.2)
BASOPHILS RELATIVE PERCENT: 0.3 % (ref 0–2)
BUN BLDV-MCNC: 15 MG/DL (ref 8–23)
CALCIUM SERPL-MCNC: 9.1 MG/DL (ref 8.6–10.2)
CHLORIDE BLD-SCNC: 99 MMOL/L (ref 98–107)
CO2: 26 MMOL/L (ref 22–29)
CREAT SERPL-MCNC: 1 MG/DL (ref 0.5–1)
EOSINOPHILS ABSOLUTE: 0 E9/L (ref 0.05–0.5)
EOSINOPHILS RELATIVE PERCENT: 0 % (ref 0–6)
GFR AFRICAN AMERICAN: >60
GFR NON-AFRICAN AMERICAN: 54 ML/MIN/1.73
GLUCOSE BLD-MCNC: 140 MG/DL (ref 74–99)
HCT VFR BLD CALC: 38 % (ref 34–48)
HEMOGLOBIN: 12.2 G/DL (ref 11.5–15.5)
IMMATURE GRANULOCYTES #: 0.08 E9/L
IMMATURE GRANULOCYTES %: 0.7 % (ref 0–5)
LYMPHOCYTES ABSOLUTE: 1.02 E9/L (ref 1.5–4)
LYMPHOCYTES RELATIVE PERCENT: 8.5 % (ref 20–42)
MCH RBC QN AUTO: 29.3 PG (ref 26–35)
MCHC RBC AUTO-ENTMCNC: 32.1 % (ref 32–34.5)
MCV RBC AUTO: 91.3 FL (ref 80–99.9)
MONOCYTES ABSOLUTE: 1.39 E9/L (ref 0.1–0.95)
MONOCYTES RELATIVE PERCENT: 11.5 % (ref 2–12)
NEUTROPHILS ABSOLUTE: 9.54 E9/L (ref 1.8–7.3)
NEUTROPHILS RELATIVE PERCENT: 79 % (ref 43–80)
PDW BLD-RTO: 13.1 FL (ref 11.5–15)
PLATELET # BLD: 255 E9/L (ref 130–450)
PMV BLD AUTO: 9.5 FL (ref 7–12)
POTASSIUM REFLEX MAGNESIUM: 4.2 MMOL/L (ref 3.5–5)
RBC # BLD: 4.16 E12/L (ref 3.5–5.5)
SODIUM BLD-SCNC: 137 MMOL/L (ref 132–146)
WBC # BLD: 12.1 E9/L (ref 4.5–11.5)

## 2020-09-02 PROCEDURE — 6370000000 HC RX 637 (ALT 250 FOR IP): Performed by: INTERNAL MEDICINE

## 2020-09-02 PROCEDURE — 96376 TX/PRO/DX INJ SAME DRUG ADON: CPT

## 2020-09-02 PROCEDURE — 85025 COMPLETE CBC W/AUTO DIFF WBC: CPT

## 2020-09-02 PROCEDURE — 2580000003 HC RX 258: Performed by: SURGERY

## 2020-09-02 PROCEDURE — G0378 HOSPITAL OBSERVATION PER HR: HCPCS

## 2020-09-02 PROCEDURE — 96374 THER/PROPH/DIAG INJ IV PUSH: CPT

## 2020-09-02 PROCEDURE — 96372 THER/PROPH/DIAG INJ SC/IM: CPT

## 2020-09-02 PROCEDURE — 36415 COLL VENOUS BLD VENIPUNCTURE: CPT

## 2020-09-02 PROCEDURE — 80048 BASIC METABOLIC PNL TOTAL CA: CPT

## 2020-09-02 PROCEDURE — 96375 TX/PRO/DX INJ NEW DRUG ADDON: CPT

## 2020-09-02 PROCEDURE — 6370000000 HC RX 637 (ALT 250 FOR IP): Performed by: SURGERY

## 2020-09-02 PROCEDURE — 6360000002 HC RX W HCPCS: Performed by: SURGERY

## 2020-09-02 RX ORDER — KETOROLAC TROMETHAMINE 15 MG/ML
15 INJECTION, SOLUTION INTRAMUSCULAR; INTRAVENOUS EVERY 6 HOURS
Status: DISCONTINUED | OUTPATIENT
Start: 2020-09-02 | End: 2020-09-03 | Stop reason: HOSPADM

## 2020-09-02 RX ORDER — SODIUM CHLORIDE, SODIUM LACTATE, POTASSIUM CHLORIDE, CALCIUM CHLORIDE 600; 310; 30; 20 MG/100ML; MG/100ML; MG/100ML; MG/100ML
INJECTION, SOLUTION INTRAVENOUS CONTINUOUS
Status: DISCONTINUED | OUTPATIENT
Start: 2020-09-02 | End: 2020-09-03

## 2020-09-02 RX ADMIN — SODIUM CHLORIDE, POTASSIUM CHLORIDE, SODIUM LACTATE AND CALCIUM CHLORIDE: 600; 310; 30; 20 INJECTION, SOLUTION INTRAVENOUS at 11:16

## 2020-09-02 RX ADMIN — HYDROCHLOROTHIAZIDE 12.5 MG: 12.5 TABLET ORAL at 11:11

## 2020-09-02 RX ADMIN — LOSARTAN POTASSIUM 50 MG: 50 TABLET, FILM COATED ORAL at 11:11

## 2020-09-02 RX ADMIN — FAMOTIDINE 20 MG: 20 TABLET ORAL at 11:11

## 2020-09-02 RX ADMIN — ENOXAPARIN SODIUM 40 MG: 40 INJECTION SUBCUTANEOUS at 11:12

## 2020-09-02 RX ADMIN — SOTALOL HYDROCHLORIDE 120 MG: 80 TABLET ORAL at 21:45

## 2020-09-02 RX ADMIN — METFORMIN HYDROCHLORIDE 500 MG: 500 TABLET, EXTENDED RELEASE ORAL at 16:21

## 2020-09-02 RX ADMIN — SODIUM CHLORIDE, POTASSIUM CHLORIDE, SODIUM LACTATE AND CALCIUM CHLORIDE: 600; 310; 30; 20 INJECTION, SOLUTION INTRAVENOUS at 21:45

## 2020-09-02 RX ADMIN — ASPIRIN 81 MG: 81 TABLET, COATED ORAL at 11:10

## 2020-09-02 RX ADMIN — CITALOPRAM HYDROBROMIDE 10 MG: 20 TABLET ORAL at 21:44

## 2020-09-02 RX ADMIN — MORPHINE SULFATE 2 MG: 2 INJECTION, SOLUTION INTRAMUSCULAR; INTRAVENOUS at 03:14

## 2020-09-02 RX ADMIN — KETOROLAC TROMETHAMINE 15 MG: 15 INJECTION, SOLUTION INTRAMUSCULAR; INTRAVENOUS at 18:55

## 2020-09-02 RX ADMIN — ONDANSETRON 4 MG: 2 INJECTION INTRAMUSCULAR; INTRAVENOUS at 12:08

## 2020-09-02 RX ADMIN — SOTALOL HYDROCHLORIDE 120 MG: 80 TABLET ORAL at 11:10

## 2020-09-02 RX ADMIN — SODIUM CHLORIDE, PRESERVATIVE FREE 10 ML: 5 INJECTION INTRAVENOUS at 21:45

## 2020-09-02 RX ADMIN — KETOROLAC TROMETHAMINE 15 MG: 15 INJECTION, SOLUTION INTRAMUSCULAR; INTRAVENOUS at 13:30

## 2020-09-02 RX ADMIN — LEVOTHYROXINE SODIUM 50 MCG: 50 TABLET ORAL at 21:44

## 2020-09-02 ASSESSMENT — PAIN SCALES - GENERAL
PAINLEVEL_OUTOF10: 0
PAINLEVEL_OUTOF10: 8
PAINLEVEL_OUTOF10: 7
PAINLEVEL_OUTOF10: 8
PAINLEVEL_OUTOF10: 4

## 2020-09-02 ASSESSMENT — PAIN DESCRIPTION - LOCATION: LOCATION: ABDOMEN

## 2020-09-02 ASSESSMENT — PAIN DESCRIPTION - DESCRIPTORS: DESCRIPTORS: SORE

## 2020-09-02 ASSESSMENT — PAIN DESCRIPTION - PAIN TYPE: TYPE: SURGICAL PAIN

## 2020-09-02 ASSESSMENT — PAIN DESCRIPTION - ORIENTATION: ORIENTATION: RIGHT;LEFT

## 2020-09-02 NOTE — PROGRESS NOTES
[facility] Quality Flow/Interdisciplinary Rounds Progress Note        Quality Flow Rounds held on September 2, 2020    Disciplines Attending:  Bedside Nurse, ,  and Nursing Unit Leadership    Alessandra Seth was admitted on 9/1/2020  6:26 AM    Anticipated Discharge Date:  Expected Discharge Date: 09/02/20    Disposition:    Rajiv Score:  Rajiv Scale Score: 22    Readmission Risk              Risk of Unplanned Readmission:        0           Discussed patient goal for the day, patient clinical progression, and barriers to discharge.   The following Goal(s) of the Day/Commitment(s) have been identified:  Possible discharge home today      Ed Hauser  September 2, 2020

## 2020-09-02 NOTE — CARE COORDINATION
CM note: covid not tested. Met with patient for transition of care planning. Pt lives with a friend, is independent with ADLs, cares for her ostomy, denies any discharge needs at this time. Hx of [facility] with [facility], PCP Dr. Mushtaq Ramos. Family or friend will provide transportation at discharge.

## 2020-09-02 NOTE — PROGRESS NOTES
ET Nurse  Admit Date: 9/1/2020  6:26 AM    Reason for consult:      Significant history:    Past Medical History:   Diagnosis Date    Arthritis     History of blood transfusion     History of cardiovascular stress test 6/2015    lexiscan    Hypertension     Sciatica     Right    Spinal headache     Tachycardia     on metoprolol ,paroxysmal, usually at HS    Thyroid disease        Stoma assessment:  Stoma good red color serosang drainage  Pouch is intact    Plan:    Discussed ostomy care  Pt wants a 2 piece system  Plan for lesson in am    Ari Granados 9/2/2020 2:27 PM

## 2020-09-02 NOTE — CONSULTS
CONSULT    H&P    Patient ID:  Nikko Wetzel  [id number]  9 y.o.  1945     Reason for consult: Medical management for hypertension, prediabetes, osteoarthritis, hypothyroidism. Requesting Physician: Dr. Cristin Man. History Obtained From: Patient. HISTORY OF PRESENT ILLNESS:    The patient is a 77 y.o. female who presents with known history of hypertension, severe diverticular bleed, had colostomy, patient ileostomy first time around had a herniation, second time around revision caused ileal loop nonfunctioning as patient has severe abdominal muscle wall weakness, patient seen post revision of her ileostomy. Patient has blood tinge   Drainage in ileostomy. Pain control.   Past Medical History:        Diagnosis Date    Arthritis     History of blood transfusion     History of cardiovascular stress test 6/2015    lexiscan    Hypertension     Sciatica     Right    Spinal headache     Tachycardia     on metoprolol ,paroxysmal, usually at HS    Thyroid disease        Past Surgical History:        Procedure Laterality Date    COLONOSCOPY      COLONOSCOPY N/A 6/27/2018    COLONOSCOPY CONTROL HEMORRHAGE performed by Otoniel Seaman MD at [address] [facility] [place], DIAGNOSTIC      HEEL SPUR SURGERY      HERNIA REPAIR N/A 7/21/2020    PARASTOMAL HERNIA REPAIR WITH MESH, LAPAROSCOPIC ROBOTIC XI ASSISTED performed by Felice Kirkpatrick MD at [facility] Right     R Navin [address] OTHER SURGICAL HISTORY Right 05/09/2017    right TKA    VT COLONOSCOPY FLX DX W/COLLJ SPEC WHEN PFRMD N/A 6/26/2018    COLONOSCOPY performed by Margot Krishnamurthy MD at [address] LAP,SURG,COLECT,TOT,W/PROCTECT,W/ILEOST N/A 7/2/2018    LAPAROSCOPIC POSS OPEN SUBTOTAL COLECTOMY POSS ILEOSTOMY performed by Felice Kirkpatrick MD at [address] [place] [address]  1/22/2019    SIGMOIDOSCOPY FLEXIBLE performed by Felice Kirkpatrick MD at [place] [address] 7/23/2020    LAPAROSCOPIC REPAIR OF PARASTOMAL HERNIA WITH REVISION OF ILEOSTOMY performed by Anna Perea MD at [facility] N/A 9/1/2020    LAPAROSCOPIC REVISION OF ILEOSTOMY performed by Anna Perea MD at [address] Left 6/11/2019    LEFT TOTAL KNEE ARTHROPLASTY (LUIS ENRIQUE) performed by Leydi Enrique DO at [facility]         Medications Prior to Admission:    Medications Prior to Admission: sotalol (BETAPACE) 80 MG tablet, Take 1 tablet by mouth 2 times daily (Patient taking differently: Take 80 mg by mouth 2 times daily Pt takes 1.5 tablets twice daily)  levothyroxine (SYNTHROID) 50 MCG tablet, Take 50 mcg by mouth nightly   nystatin (MYCOSTATIN) 835103 UNIT/GM cream, Apply topically 2 times daily TO SURROUNDING AREA OF STOMA  ondansetron (ZOFRAN-ODT) 4 MG disintegrating tablet, Take 1 tablet by mouth every 8 hours as needed for Nausea or Vomiting  TURMERIC PO, Take by mouth daily  NONFORMULARY, Natural supplement/anti inflammatory  metFORMIN (GLUCOPHAGE-XR) 500 MG extended release tablet, TAKE ONE TABLET BY MOUTH TWO TIMES A DAY AFTER MEALS  aspirin 81 MG EC tablet, Take 81 mg by mouth daily  Citalopram Hydrobromide (CELEXA PO), Take 10 mg by mouth nightly   Misc Natural Products (GLUCOSAMINE CHONDROITIN ADV PO), Take 1 tablet by mouth daily  losartan-hydrochlorothiazide (HYZAAR) 50-12.5 MG per tablet, Take 1 tablet by mouth daily  vitamin D-3 (CHOLECALCIFEROL) 5000 UNITS TABS, Take 5,000 Units by mouth daily    Allergies:  Sulfa antibiotics; Talwin [pentazocine]; Tetracyclines & related; and Bactrim [sulfamethoxazole-trimethoprim]    Social History:   TOBACCO:   reports that she has never smoked. She has never used smokeless tobacco.  ETOH:   reports current alcohol use.   Patient currently lives alone    Family History:       Problem Relation Age of Onset    Cancer Mother     Cancer Father        REVIEW OF SYSTEMS:  CONSTITUTIONAL: Neg   Recent weight changes,fatigue,fever,chills or night sweats  EYES:  Neg  blurriness,tearing,itching or acute change in vision  EARS:  Neg   hearing loss,tinnitus,vertigo,discharge or earache. NOSE:  Neg  rhinorrhea,sneezing,itching,allergy or epistaxis  MOUTH/THROAT:  Neg  bleeding gums,hoarseness or sore throat. RESPIRATORY:   Neg SOB,wheeze,cough,sputum,hemoptysis or bronochitis  CARDIOVASCULAR   Neg : chest pain,palpitations,dyspnea on exertion,orthopnea,paroxysmal nocturnal dyspnea or edema  GASTROINTESTINAL:  Neg   Appetite changes,nausea,vomiting,or diarrhea,indigestion,dysphagia,change in bowel movements, patient has postoperative abdominal pain. Ileostomy with blood drainage. GENITOURINARY:  Neg  Urinary frequency,hesitancy,urgency,polyuria,dysuria,hematuria,nocturia,or incontinence. HEMATOLOGIC/LYMPHATIC:  Neg  Anemia,bleeding tendency  MUSCULOSKELETAL:    New myalgias,bone pain,joint pain,swelling or stiffness and has had  change in gait. NEUROLOGICAL:  Neg  Loss of Consciousness,memeory loss,forgetfulness,periods of confusion,difficulty concentrating,seizures,decline in intellect,nervousness,insomina,aphasia or dysarthria. SKIN :  Neg  skin or hair changes,and has no itching,rashes,sores. PSYCHIATRIC:  Neg depression,personality changes,anxiety. ENDOCRINE:  Neg polydipsia,polyuria,abnormal weight changes,heat /cold intolerance. ALL/IMM :  Neg reactions to drugs other than listed      PHYSICAL EXAM:  BP (!) 178/77   Pulse 51   Temp 97.9 °F (36.6 °C) (Oral)   Resp 16   Ht 5' 3.5\" (1.613 m)   Wt 236 lb (107 kg)   SpO2 96%   BMI 41.15 kg/m²   General appearance: alert, appears stated age, cooperative and no distress  Head: Normocephalic, without obvious abnormality, atraumatic  Eyes: conjunctivae/corneas clear. PERRL, EOM's intact.    Ears: normal external ear canals both ears  Neck: no adenopathy, no carotid bruit, no JVD, supple, symmetrical, trachea midline and thyroid not enlarged, symmetric, no tenderness/mass/nodules  Lungs: clear to auscultation bilaterally,no rales or wheezes   Heart: regular rate and rhythm, S1, S2 normal, no murmur,  regular rate and rhythm   Abdomen:soft, non-tender; non-distended normal bowel sounds no masses, no organomegaly  Extremities:Pedal edema Trace  Homans sign is negative, no sign of DVT  Skin: Skin color, texture, turgor normal. No rashes or lesions  Lymphatic: No palpable lymph node enlargment  Neurologic: Alert and oriented X 3, normal strength and tone. Normal symmetric reflexes. Mental status: Alert, oriented, thought content appropriate  Cranial nerves:II-XII Grossly intact  Sensory: normal  Motor:grossly normal    DATA:  Imaging:  No results found.     CBC with Differential:    Lab Results   Component Value Date    WBC 12.1 09/02/2020    RBC 4.16 09/02/2020    HGB 12.2 09/02/2020    HCT 38.0 09/02/2020     09/02/2020    MCV 91.3 09/02/2020    SEGSPCT 72 01/18/2014    LYMPHOPCT 8.5 09/02/2020     BMP:    Lab Results   Component Value Date     09/02/2020    K 4.2 09/02/2020    CL 99 09/02/2020    CO2 26 09/02/2020    BUN 15 09/02/2020    CREATININE 1.0 09/02/2020    CALCIUM 9.1 09/02/2020    GFRAA >60 09/02/2020    LABGLOM 54 09/02/2020    GLUCOSE 140 09/02/2020    GLUCOSE 87 03/22/2012     PT/INR:  No results found for: PTINR      ASSESSMENT:      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 Hypertension continue present home antihypertensive medication. 2.   Paroxysmal atrial fibrillation continue her antiarrhythmic. 3.   DVT prophylaxis SCDs. 4.   Revision of ileostomy for dysfunctional ileostomy await bowel sounds to return back to normal.  5.   Hypothyroidism continue her thyroid medication. See my Orders.       Completed By:  Dr. Laurel Ca MD, FACP, [facility].  1:57 PM  9/2/2020    Electronically signed by Isabel Man MD on 9/2/20 at 1:57 PM EDT

## 2020-09-02 NOTE — PROGRESS NOTES
GENERAL SURGERY  DAILY PROGRESS NOTE  9/2/2020    Subjective:  Feels well. States that the majority of her pain is across her upper abdomen at her new stoma site.  Minimal gas in bag, no stool    Objective:  /67   Pulse 55   Temp 98.1 °F (36.7 °C) (Oral)   Resp 16   Ht 5' 3.5\" (1.613 m)   Wt 236 lb (107 kg)   SpO2 92%   BMI 41.15 kg/m²     GENERAL:  Laying in bed, awake, alert, cooperative, no apparent distress  LUNGS:  No increased work of breathing  CARDIOVASCULAR:  RR  ABDOMEN:  Soft, ND, TTP around RUQ stoma and LLQ old ostomy closure site  EXTREMITIES: No edema or swelling  SKIN: Warm and dry    Assessment/Plan:  77 y.o. female sp 9/1 Laparoscopic ileostomy revision    General diet  PRN pain meds  Await bowel function  OOB, ambulate  Possoble dc home later today pending diet tolerance and bowel function    Electronically signed by Pema Lundberg DO on 9/2/2020 at 7:07 AM      As above  Await function of stoma  Add toradol as narcotics may be causing nausea, vomiting    Kierra Blackwell MD, MS  Minimally Invasive and Bariatric Surgery  [phone] (p)  9/2/2020  12:44 PM

## 2020-09-03 VITALS
RESPIRATION RATE: 16 BRPM | SYSTOLIC BLOOD PRESSURE: 195 MMHG | TEMPERATURE: 98.5 F | HEART RATE: 63 BPM | WEIGHT: 236 LBS | DIASTOLIC BLOOD PRESSURE: 88 MMHG | BODY MASS INDEX: 40.29 KG/M2 | HEIGHT: 64 IN | OXYGEN SATURATION: 96 %

## 2020-09-03 PROCEDURE — 96372 THER/PROPH/DIAG INJ SC/IM: CPT

## 2020-09-03 PROCEDURE — 44310 ILEOSTOMY/JEJUNOSTOMY: CPT | Performed by: SURGERY

## 2020-09-03 PROCEDURE — G0378 HOSPITAL OBSERVATION PER HR: HCPCS

## 2020-09-03 PROCEDURE — 6370000000 HC RX 637 (ALT 250 FOR IP): Performed by: INTERNAL MEDICINE

## 2020-09-03 PROCEDURE — 6370000000 HC RX 637 (ALT 250 FOR IP): Performed by: SURGERY

## 2020-09-03 PROCEDURE — 6360000002 HC RX W HCPCS: Performed by: SURGERY

## 2020-09-03 RX ADMIN — ASPIRIN 81 MG: 81 TABLET, COATED ORAL at 08:14

## 2020-09-03 RX ADMIN — FAMOTIDINE 20 MG: 20 TABLET ORAL at 08:14

## 2020-09-03 RX ADMIN — SOTALOL HYDROCHLORIDE 120 MG: 80 TABLET ORAL at 08:13

## 2020-09-03 RX ADMIN — CHOLECALCIFEROL TAB 125 MCG (5000 UNIT) 5000 UNITS: 125 TAB at 08:15

## 2020-09-03 RX ADMIN — ENOXAPARIN SODIUM 40 MG: 40 INJECTION SUBCUTANEOUS at 08:30

## 2020-09-03 RX ADMIN — METFORMIN HYDROCHLORIDE 500 MG: 500 TABLET, EXTENDED RELEASE ORAL at 08:14

## 2020-09-03 RX ADMIN — HYDROCHLOROTHIAZIDE 12.5 MG: 12.5 TABLET ORAL at 08:14

## 2020-09-03 RX ADMIN — KETOROLAC TROMETHAMINE 15 MG: 15 INJECTION, SOLUTION INTRAMUSCULAR; INTRAVENOUS at 06:46

## 2020-09-03 RX ADMIN — LOSARTAN POTASSIUM 50 MG: 50 TABLET, FILM COATED ORAL at 08:13

## 2020-09-03 RX ADMIN — KETOROLAC TROMETHAMINE 15 MG: 15 INJECTION, SOLUTION INTRAMUSCULAR; INTRAVENOUS at 01:22

## 2020-09-03 ASSESSMENT — PAIN DESCRIPTION - ORIENTATION: ORIENTATION: LOWER

## 2020-09-03 ASSESSMENT — PAIN DESCRIPTION - PAIN TYPE: TYPE: SURGICAL PAIN

## 2020-09-03 ASSESSMENT — PAIN SCALES - GENERAL
PAINLEVEL_OUTOF10: 9
PAINLEVEL_OUTOF10: 10
PAINLEVEL_OUTOF10: 1
PAINLEVEL_OUTOF10: 0

## 2020-09-03 ASSESSMENT — PAIN DESCRIPTION - DESCRIPTORS: DESCRIPTORS: ACHING

## 2020-09-03 ASSESSMENT — PAIN DESCRIPTION - LOCATION: LOCATION: ABDOMEN

## 2020-09-03 ASSESSMENT — PAIN DESCRIPTION - PROGRESSION: CLINICAL_PROGRESSION: GRADUALLY IMPROVING

## 2020-09-03 ASSESSMENT — PAIN DESCRIPTION - ONSET: ONSET: ON-GOING

## 2020-09-03 ASSESSMENT — PAIN DESCRIPTION - FREQUENCY: FREQUENCY: CONTINUOUS

## 2020-09-03 ASSESSMENT — PAIN - FUNCTIONAL ASSESSMENT: PAIN_FUNCTIONAL_ASSESSMENT: PREVENTS OR INTERFERES SOME ACTIVE ACTIVITIES AND ADLS

## 2020-09-03 NOTE — PROGRESS NOTES
99 09/02/2020    CO2 26 09/02/2020    BUN 15 09/02/2020    CREATININE 1.0 09/02/2020    GFRAA >60 09/02/2020    LABGLOM 54 09/02/2020    GLUCOSE 140 09/02/2020    GLUCOSE 87 03/22/2012    PROT 7.7 07/23/2020    LABALBU 3.8 07/23/2020    LABALBU 4.2 03/22/2012    CALCIUM 9.1 09/02/2020    BILITOT 0.4 07/23/2020    ALKPHOS 73 07/23/2020    AST 21 07/23/2020    ALT 16 07/23/2020     PT/INR:    Lab Results   Component Value Date    PROTIME 11.8 06/04/2019    INR 1.1 06/04/2019         No orders to display       Other Data:      Intake/Output Summary (Last 24 hours) at 9/3/2020 0809  Last data filed at 9/3/2020 0645  Gross per 24 hour   Intake 881.25 ml   Output 100 ml   Net 781.25 ml         Scheduled Medications:   ketorolac  15 mg Intravenous Q6H    sodium chloride flush  10 mL Intravenous 2 times per day    enoxaparin  40 mg Subcutaneous Daily    famotidine  20 mg Oral Daily    aspirin  81 mg Oral Daily    levothyroxine  50 mcg Oral Nightly    metFORMIN  500 mg Oral BID WC    vitamin D-3  5,000 Units Oral Daily    citalopram  10 mg Oral Nightly    sotalol  120 mg Oral BID    losartan  50 mg Oral Daily    And    hydroCHLOROthiazide  12.5 mg Oral Daily         Infusion Medications:      Assessment:   Patient Active Problem List    Diagnosis Date Noted    [facility] 09/01/2020    SBO (small bowel obstruction) ([facility] 07/24/2020    Small bowel obstruction ([facility] 07/23/2020    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     Malfunction of colostomy stoma ([facility]     Arthritis of knee, right 06/11/2019    Arthritis of knee, left 06/11/2019    Paroxysmal atrial fibrillation ([facility] 06/29/2018    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 2.  Parastomal herniation dysfunctioning of colostomy patient had new ileostomy placed and its functioning probable discharge today. 3.  Past history of diverticular bleed patient has been doing well. 4.  Osteoarthritis pain well controlled. Continue current therapy. See orders for further plan of care.     Completed By:  Dr. Verónica Kirk MD, Cristiana Huff.  8:09 AM  9/3/2020      Electronically signed by Annette Harding MD on 9/3/20 at 8:09 AM EDT

## 2020-09-04 NOTE — CARE COORDINATION
CM note: discharge follow up call placed, no answer, left a message to return call to CM with any questions/concerns.   Electronically signed by Sb Mars RN on 9/4/2020 at 2:17 PM

## 2020-09-05 NOTE — OP NOTE
[address]                                OPERATIVE REPORT    PATIENT NAME: Yennifer Leary                    :        1945  MED REC NO:   [id number]                            ROOM:       7798  ACCOUNT NO:   [de-identified]                           ADMIT DATE: 2020  PROVIDER:     Ady Hand MD    DATE OF PROCEDURE:  2020    PREOPERATIVE DIAGNOSES:  Ileostomy in place and ileostomy malfunction. POSTOPERATIVE DIAGNOSES:  Ileostomy in place and ileostomy malfunction. PROCEDURE PERFORMED:  Laparoscopic revision of an ileostomy. SURGEON:  Ady Hand MD    ASSISTANTS:  None. COMPLICATIONS:  None. ESTIMATED BLOOD LOSS:  20 mL. FLUIDS:  Crystalloid. SPECIMENS:  Ileostomy. DESCRIPTION OF THE PROCEDURE:  This is a 46-year-old female with a  history of a subtotal colectomy for bleeding. Subsequently developed an  end-ileostomy. Because of this, she developed a parastomal hernia,  which was repaired and subsequently had worsening problems keeping a  seal around her ileostomy; therefore, it was moved to an alternative  location. The alternative location developed worsening rash around the  ostomy site. She had trouble keeping the seal secondary to her body  habitus. The ostomy continued to give her difficulties with skin rashes  and pain, therefore we elected to remove it and move it to an  alternative location. She was preoperatively marked by an ostomy nurse. She was taken to the operating room, placed in supine position,  administered general anesthesia and intubated. Once the airway was  secured and she was adequately sedated, she was prepped and draped in  normal sterile fashion. Time-out was performed to confirm the surgical  site and the patient's name. She received preoperative antibiotics IV  within 30 minutes of incision.   I initially closed the ileostomy prior  to prepping and draping. I then created a circular incision around the  closed ostomy site, used electrocautery to dissect down to the  subcutaneous tissue until I identified the ileostomy and subcu tissue,  and I coursed all the way down to the fascial level. I released it off  the fascia. There was extensive scarring consistent with a good healing  process. I was able to free it up completely. Once I had freed it up  adequately, I eviscerated the abdomen until I found an area of healthy  ileum proximal to it, clamped across the mesentery, and sacrificed it  with electrocautery. I placed a running 0 Vicryl stitch to tie off the  mesentery. I then used a SHANNEN-75 stapler to staple across the end of the  ileostomy and passed off the table, labeled it ileostomy. I then  created a circular defect in the right upper quadrant at the site that  was marked by the ileostomy nurse. I used electrocautery to dissect  down through the subcutaneous tissue, identified the anterior sheath,  created a cruciate incision, identified the rectus muscle,  it  medial and lateral and then opened the posterior sheath. I inserted two  fingers into the abdomen. At this point, I was satisfied with the  ileostomy creation site. I then used a laparoscopic grasper in order to  pass the ileostomy underneath the abdominal wall into the new ileostomy  site. I then pulled it up through the subcutaneous tissue, secured in  place with a [place]. I then placed a wound protector device in the  ileostomy site and insufflated to 15 mmHg. I then inspected. There  were some adhesions in the right lower quadrant. There was no kinking  or twisting of the ileostomy that had been brought up to the new site,  and therefore, I was satisfied with its positioning and placement.   I  then decompressed the abdomen, removed the wound protector device, and  reapproximate the fascia using a #1 PDS suture in a running fashion,  irrigated out the subcu space and closed with interrupted 3-0 Vicryl  dermal stitches. I then placed Betadine-soaked lisa in the old  ileostomy site and placed a sterile dressing over the top. I then  matured the ileostomy in the right upper quadrant with the Bettie  fashion using 3-0 Vicryl stitches, placing the ostomy appliance over the  top after digitalizing with the finger to ensure that it passed easily  through the fascia and there was no kinking. The patient was then  awoken, extubated, and transferred to the postoperative care unit in  stable condition. All instrument counts, lap counts, and needle counts  were correct at the completion of the procedure.       Elena Koenig MD    D: 09/04/2020 8:51:57       T: 09/04/2020 9:59:23     [id number]  Job#: [id number]     Doc#: [id number]    CC:  Ayo Mcgregor MD

## 2020-09-16 ENCOUNTER — TELEPHONE (OUTPATIENT)
Dept: SURGERY | Age: 75
End: 2020-09-16

## 2020-09-16 ENCOUNTER — OFFICE VISIT (OUTPATIENT)
Dept: SURGERY | Age: 75
End: 2020-09-16

## 2020-09-16 VITALS
DIASTOLIC BLOOD PRESSURE: 83 MMHG | SYSTOLIC BLOOD PRESSURE: 147 MMHG | BODY MASS INDEX: 40.29 KG/M2 | HEART RATE: 67 BPM | OXYGEN SATURATION: 94 % | WEIGHT: 236 LBS | HEIGHT: 64 IN | TEMPERATURE: 97.6 F

## 2020-09-16 PROCEDURE — 99024 POSTOP FOLLOW-UP VISIT: CPT | Performed by: SURGERY

## 2020-09-16 RX ORDER — FLUCONAZOLE 100 MG/1
100 TABLET ORAL DAILY
Qty: 7 TABLET | Refills: 0 | Status: SHIPPED | OUTPATIENT
Start: 2020-09-16 | End: 2020-09-23

## 2020-09-16 RX ORDER — CLOTRIMAZOLE 1 %
CREAM (GRAM) TOPICAL 2 TIMES DAILY
Qty: 1 TUBE | Refills: 1 | Status: SHIPPED
Start: 2020-09-16 | End: 2021-03-31 | Stop reason: ALTCHOICE

## 2020-09-16 NOTE — TELEPHONE ENCOUNTER
Pratibha from [address] [place] [address] called to let us know that Tara Garcia has [address] [address] which is not covered under [facility]. Pratibha stated that we would need to call another homecare.      Electronically signed by Roxanne Martinez on 9/16/2020 at 3:36 PM

## 2020-09-16 NOTE — PROGRESS NOTES
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 No nausea, no vomiting. She has been cutting the stoma opening bigger and her skin is now very irritated. No RLQ pain or LLQ pain    Objective:  BP (!) 147/83   Pulse 67   Temp 97.6 °F (36.4 °C) (Temporal)   Ht 5' 3.5\" (1.613 m)   Wt 236 lb (107 kg)   SpO2 94%   BMI 41.15 kg/m²   Labs:  No results for input(s): WBC, HGB, HCT in the last 72 hours. Invalid input(s): PLR  Lab Results   Component Value Date    CREATININE 1.0 09/02/2020    BUN 15 09/02/2020     09/02/2020    K 4.2 09/02/2020    CL 99 09/02/2020    CO2 26 09/02/2020     No results for input(s): LIPASE, AMYLASE in the last 72 hours. General appearance: AA, NAD  HEENT: NCAT, PERRLA, EOMI  Lungs: Clear, equal rise bilateral  Heart: Reg  Abdomen: soft, nondistended, nontender, incisions well healed, RLQ incision clean, dry, small opening middle of incision with exudate, debrided and dressed.  LLQ well healed, RUQ skin irritation surrounding, stoma bag is cut too large and skin is showing chemical burn from biliary contact, changed bag and used colopaste to obtain seal  Skin: No lesions, incisions well healed  Psych: No distress, conversive, no hallucinations  : No ulcers or lesions  Rectal: No bleeding    A complete 10 system review was performed and are otherwise negative unless mentioned in the above HPI. Specific negatives are listed below but may not include all those reviewed. General ROS: negative obtundation, AMS  ENT ROS: negative rhinorrhea, epistaxis  Allergy and Immunology ROS: negative itchy/watery eyes or nasal congestion  Hematological and Lymphatic ROS: negative spontaneous bleeding or bruising  Endocrine ROS: negative  lethargy, mood swings, palpitations or polydipsia/polyuria  Respiratory ROS: negative sputum changes, stridor, tachypnea or wheezing  Cardiovascular ROS: negative for - loss of consciousness, murmur or orthopnea  Gastrointestinal ROS: negative for - hematochezia or hematemesis  Genito-Urinary ROS: negative for -  genital discharge or hematuria  Musculoskeletal ROS: negative for - focal weakness, gangrene  Psych/Neuro ROS: negative for - visual or auditory hallucinations, suicidal ideation      Time spent reviewing past medical, surgical, social and family history, vitals, nursing assessment and images. Assessment/Plan:  Chapito Powers is a 77 y.o. female 2 weeks s/p repair parastomal hernia revision and moving of end ileostomy for second time      She continues to have poor ability to maintain seal around her stoma.   I do not believe this is secondary to functionality the stoma but it is difficult for her to cut her stoma appliance to the correct size she is also using techniques that have not been recommended by either the stoma nurse and myself  Today she showed up with a 4 x 4 tucked underneath her stomal appliance which was saturated and bile  This allowed skin to be constantly irritated by bile  I had extensive discussion with her as well as with the stomal nurse in the office I reapplied her appliance using colo-paste and obtained a good seal  She states she has been confined at home due to her inability to obtain a seal therefore will have home nursing assist  Due to her skin irritation will prescribe her Diflucan p.o. in addition to Lotrimin skin antifungal  We will have home care nurse come out to the house and assist with skin care as well as stoma appliance attachment  She continues to have multiple episodes of attempting to get a seal during the day which again contributes to skin irritation  I am confident if she can obtain a good seal she can leave the appliance in place for several days  Follow-up with me as needed  Follow-up with stomal nurse routinely    Physician Signature: Electronically signed by Dr. Sweetie Washington  [phone] (p)  9/16/2020  2:20 PM

## 2020-10-02 ENCOUNTER — HOSPITAL ENCOUNTER (OUTPATIENT)
Age: 75
Discharge: HOME | End: 2020-10-04
Payer: MEDICARE

## 2020-10-02 LAB
ALBUMIN SERPL-MCNC: 3.9 G/DL (ref 3.5–5.2)
ALP BLD-CCNC: 84 U/L (ref 35–104)
ALT SERPL-CCNC: 29 U/L (ref 0–32)
ANION GAP SERPL CALCULATED.3IONS-SCNC: 16 MMOL/L (ref 7–16)
AST SERPL-CCNC: 33 U/L (ref 0–31)
BILIRUB SERPL-MCNC: 0.4 MG/DL (ref 0–1.2)
BUN BLDV-MCNC: 18 MG/DL (ref 8–23)
CALCIUM SERPL-MCNC: 10.2 MG/DL (ref 8.6–10.2)
CHLORIDE BLD-SCNC: 102 MMOL/L (ref 98–107)
CHOLESTEROL, TOTAL: 140 MG/DL (ref 0–199)
CO2: 21 MMOL/L (ref 22–29)
CREAT SERPL-MCNC: 1 MG/DL (ref 0.5–1)
GFR AFRICAN AMERICAN: >60
GFR NON-AFRICAN AMERICAN: 54 ML/MIN/1.73
GLUCOSE BLD-MCNC: 127 MG/DL (ref 74–99)
HBA1C MFR BLD: 6 % (ref 4–5.6)
HDLC SERPL-MCNC: 42 MG/DL
LDL CHOLESTEROL CALCULATED: 54 MG/DL (ref 0–99)
POTASSIUM SERPL-SCNC: 4.6 MMOL/L (ref 3.5–5)
SODIUM BLD-SCNC: 139 MMOL/L (ref 132–146)
T4 FREE: 1.25 NG/DL (ref 0.93–1.7)
TOTAL PROTEIN: 7.8 G/DL (ref 6.4–8.3)
TRIGL SERPL-MCNC: 222 MG/DL (ref 0–149)
TSH SERPL DL<=0.05 MIU/L-ACNC: 3.05 UIU/ML (ref 0.27–4.2)
VLDLC SERPL CALC-MCNC: 44 MG/DL

## 2020-10-02 PROCEDURE — 83036 HEMOGLOBIN GLYCOSYLATED A1C: CPT

## 2020-10-02 PROCEDURE — 84439 ASSAY OF FREE THYROXINE: CPT

## 2020-10-02 PROCEDURE — 80053 COMPREHEN METABOLIC PANEL: CPT

## 2020-10-02 PROCEDURE — 84443 ASSAY THYROID STIM HORMONE: CPT

## 2020-10-02 PROCEDURE — 80061 LIPID PANEL: CPT

## 2020-12-21 ENCOUNTER — HOSPITAL ENCOUNTER (OUTPATIENT)
Dept: MAMMOGRAPHY | Age: 75
Discharge: HOME | End: 2020-12-23
Payer: MEDICARE

## 2020-12-21 PROCEDURE — 77063 BREAST TOMOSYNTHESIS BI: CPT

## 2021-03-31 ENCOUNTER — OFFICE VISIT (OUTPATIENT)
Dept: SURGERY | Age: 76
End: 2021-03-31
Payer: MEDICARE

## 2021-03-31 VITALS
DIASTOLIC BLOOD PRESSURE: 78 MMHG | HEIGHT: 64 IN | SYSTOLIC BLOOD PRESSURE: 129 MMHG | TEMPERATURE: 97.3 F | BODY MASS INDEX: 40.29 KG/M2 | WEIGHT: 236 LBS | HEART RATE: 68 BPM

## 2021-03-31 DIAGNOSIS — Z93.2 ILEOSTOMY IN PLACE (HCC): Primary | ICD-10-CM

## 2021-03-31 DIAGNOSIS — K94.01 BLEEDING FROM COLOSTOMY STOMA (HCC): ICD-10-CM

## 2021-03-31 PROCEDURE — 99213 OFFICE O/P EST LOW 20 MIN: CPT | Performed by: SURGERY

## 2021-03-31 RX ORDER — CELECOXIB 200 MG/1
200 CAPSULE ORAL EVERY EVENING
COMMUNITY

## 2021-03-31 NOTE — PROGRESS NOTES
General Surgery History and Physical  [facility]    Patient's Name/Date of Birth: Jaquan Damon / 1945    Date: March 31, 2021     Surgeon: Cheyenne Hill M.D.    PCP: Nikolay Scott MD     Chief Complaint: Bleeding from her stoma    HPI:   Jaquan Damon is a 77 y.o. female who presents for evaluation of bleeding from her stoma. Timing is intermittent, radiation to peristomal, alleviated by none and started a week ago and severity is 8/10. Patient with a complex history of an end ileostomy status post multiple parastomal hernia repairs. Presents with bleeding around her stoma intermittently filling the bag. She denies any bleeding today. She is been discussing this over the phone with the stomal nurse who had recommended follow-up with me. Today in the office she has no active bleeding she has been using hemostatic powder in order to control the bleeding at times. Denies any dizziness shortness of breath chest pain or lightheadedness.     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    Malfunction of colostomy stoma ([address]    Small bowel obstruction ([address]    SBO (small bowel obstruction) ([address]    Ileostomy dysfunction ([address]       Past Medical History:   Diagnosis Date    Arthritis     History of blood transfusion     History of cardiovascular stress test 6/2015    lexiscan    Hypertension     Sciatica     Right    Spinal headache     Tachycardia     on metoprolol ,paroxysmal, usually at HS    Thyroid disease        Past Surgical History: Procedure Laterality Date    COLONOSCOPY      COLONOSCOPY N/A 6/27/2018    COLONOSCOPY CONTROL HEMORRHAGE performed by Adenike Britt MD at [address] [facility], COLON, DIAGNOSTIC      HEEL SPUR SURGERY      HERNIA REPAIR N/A 7/21/2020    PARASTOMAL HERNIA REPAIR WITH MESH, LAPAROSCOPIC ROBOTIC XI ASSISTED performed by Jony Grant MD at [address] Right     [address] OTHER SURGICAL HISTORY Right 05/09/2017    right TKA    SC COLONOSCOPY FLX DX W/COLLJ SPEC WHEN PFRMD N/A 6/26/2018    COLONOSCOPY performed by Silvina Islas MD at [address] LAP,SURG,COLECT,TOT,W/PROCTECT,W/ILEOST N/A 7/2/2018    LAPAROSCOPIC POSS OPEN SUBTOTAL COLECTOMY POSS ILEOSTOMY performed by Jony Grant MD at [address] [facility] 1/22/2019    SIGMOIDOSCOPY FLEXIBLE performed by Jony Grant MD at [facility] N/A 7/23/2020    LAPAROSCOPIC REPAIR OF PARASTOMAL HERNIA WITH REVISION OF ILEOSTOMY performed by Jony Grant MD at [facility] N/A 9/1/2020    LAPAROSCOPIC REVISION OF ILEOSTOMY performed by Jony Grant MD at [facility] 6/11/2019    LEFT TOTAL KNEE ARTHROPLASTY (LUIS ENRIQUE) performed by Elton Clay DO at [address]         Allergies   Allergen Reactions    Sulfa Antibiotics Nausea Only    Talwin [Pentazocine] Other (See Comments)     Shaky and sweaty    Tetracyclines & Related Dermatitis    Bactrim [Sulfamethoxazole-Trimethoprim] Rash       The patient has a family history that is negative for severe cardiovascular or respiratory issues, negative for reaction to anesthesia. Time spent reviewing past medical, surgical, social and family history, vitals, nursing assessment and images. No changes from above documented history.     Social History     Socioeconomic History    Marital status:      Spouse name: Not on file  Number of children: Not on file    Years of education: Not on file    Highest education level: Not on file   Occupational History    Not on file   Social Needs    Financial resource strain: Not on file    Food insecurity     Worry: Not on file     Inability: Not on file    Transportation needs     Medical: Not on file     Non-medical: Not on file   Tobacco Use    Smoking status: Never Smoker    Smokeless tobacco: Never Used   Substance and Sexual Activity    Alcohol use: Yes     Comment: rarely     Drug use: No    Sexual activity: Not Currently   Lifestyle    Physical activity     Days per week: Not on file     Minutes per session: Not on file    Stress: Not on file   Relationships    Social connections     Talks on phone: Not on file     Gets together: Not on file     Attends Hindu service: Not on file     Active member of club or organization: Not on file     Attends meetings of clubs or organizations: Not on file     Relationship status: Not on file    Intimate partner violence     Fear of current or ex partner: Not on file     Emotionally abused: Not on file     Physically abused: Not on file     Forced sexual activity: Not on file   Other Topics Concern    Not on file   Social History Narrative    Not on file       I have reviewed relevant labs from this admission and interpretation is included in my assessment and plan    Review of Systems    A complete 10 system review was performed and are otherwise negative unless mentioned in the above HPI. Specific negatives are listed below but may not include all those reviewed.     General ROS: negative obtundation, AMS  ENT ROS: negative rhinorrhea, epistaxis  Allergy and Immunology ROS: negative itchy/watery eyes or nasal congestion  Hematological and Lymphatic ROS: negative spontaneous bleeding or bruising  Endocrine ROS: negative  lethargy, mood swings, palpitations or polydipsia/polyuria  Respiratory ROS: negative sputum changes, stridor, tachypnea or wheezing  Cardiovascular ROS: negative for - loss of consciousness, murmur or orthopnea  Gastrointestinal ROS: negative for - hematochezia or hematemesis  Genito-Urinary ROS: negative for -  genital discharge or hematuria  Musculoskeletal ROS: negative for - focal weakness, gangrene  Psych/Neuro ROS: negative for - visual or auditory hallucinations, suicidal ideation    Physical exam:   /78   Pulse 68   Temp 97.3 °F (36.3 °C) (Temporal)   Ht 5' 3.5\" (1.613 m)   Wt 236 lb (107 kg)   BMI 41.15 kg/m²   General appearance:  NAD, appears stated age  Head: NCAT, PERRLA, EOMI, red conjunctiva  Neck: supple, no masses, trachea midline  Lungs: Equal chest rise bilateral, no retractions, no wheezing  Heart: Reg rate  Abdomen: soft, nontender, stoma appears healthy and viable with no evidence of bleeding or blood in the bag, nondistended  Skin; warm and dry, no cyanosis  Gu: no cva tenderness  Extremities: atraumatic, no focal motor deficits, no open wounds  Psych: No tremor, visual hallucinations      Radiology: I reviewed relevant abdominal imaging from this admission and that available in the EMR     Assessment:  Telma Hartmann is a 77 y.o. 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    Malfunction of colostomy stoma ([address]    Small bowel obstruction ([address]    SBO (small bowel obstruction) ([address]    Ileostomy dysfunction ([address]         Plan:  Supplied her with some silver nitrate sticks to do point cautery if the bleeding continues  I discussed with her how to use them cautions to take when using them  I also discussed with her options for surgical intervention as far as exploration under anesthesia possible suture repair of any bleeding vessels or potentially cautery to control hemorrhage  Given that she is not bleeding today I am hesitant to offer any trips to surgery given the likelihood that I will be able to find any evidence of bleeding. It is likely this is secondary to granulation tissue around the stoma and position of the stoma may exacerbate or improve the bleeding.   Follow-up with me if continued bleeding despite intervention with silver nitrate sticks  She understands and agrees        Sindhu Greene MD  2:32 PM  3/31/2021

## 2021-04-02 ENCOUNTER — HOSPITAL ENCOUNTER (OUTPATIENT)
Age: 76
Discharge: HOME | End: 2021-04-02
Payer: MEDICARE

## 2021-04-02 LAB
BASOPHILS ABSOLUTE: 0.09 E9/L (ref 0–0.2)
BASOPHILS RELATIVE PERCENT: 1 % (ref 0–2)
EOSINOPHILS ABSOLUTE: 0.39 E9/L (ref 0.05–0.5)
EOSINOPHILS RELATIVE PERCENT: 4.3 % (ref 0–6)
HCT VFR BLD CALC: 43.6 % (ref 34–48)
HEMOGLOBIN: 14 G/DL (ref 11.5–15.5)
IMMATURE GRANULOCYTES #: 0.07 E9/L
IMMATURE GRANULOCYTES %: 0.8 % (ref 0–5)
LYMPHOCYTES ABSOLUTE: 1.71 E9/L (ref 1.5–4)
LYMPHOCYTES RELATIVE PERCENT: 18.8 % (ref 20–42)
MCH RBC QN AUTO: 29.4 PG (ref 26–35)
MCHC RBC AUTO-ENTMCNC: 32.1 % (ref 32–34.5)
MCV RBC AUTO: 91.6 FL (ref 80–99.9)
MONOCYTES ABSOLUTE: 0.98 E9/L (ref 0.1–0.95)
MONOCYTES RELATIVE PERCENT: 10.8 % (ref 2–12)
NEUTROPHILS ABSOLUTE: 5.87 E9/L (ref 1.8–7.3)
NEUTROPHILS RELATIVE PERCENT: 64.3 % (ref 43–80)
PDW BLD-RTO: 13.3 FL (ref 11.5–15)
PLATELET # BLD: 257 E9/L (ref 130–450)
PMV BLD AUTO: 9.5 FL (ref 7–12)
RBC # BLD: 4.76 E12/L (ref 3.5–5.5)
WBC # BLD: 9.1 E9/L (ref 4.5–11.5)

## 2021-04-02 PROCEDURE — 85025 COMPLETE CBC W/AUTO DIFF WBC: CPT

## 2021-04-02 PROCEDURE — 36415 COLL VENOUS BLD VENIPUNCTURE: CPT

## 2021-05-10 ENCOUNTER — OFFICE VISIT (OUTPATIENT)
Dept: SURGERY | Age: 76
End: 2021-05-10
Payer: MEDICARE

## 2021-05-10 VITALS
HEIGHT: 64 IN | HEART RATE: 58 BPM | BODY MASS INDEX: 41.15 KG/M2 | OXYGEN SATURATION: 97 % | DIASTOLIC BLOOD PRESSURE: 72 MMHG | SYSTOLIC BLOOD PRESSURE: 171 MMHG | WEIGHT: 241 LBS | TEMPERATURE: 97.6 F

## 2021-05-10 DIAGNOSIS — K94.01 BLEEDING FROM COLOSTOMY STOMA (HCC): Primary | ICD-10-CM

## 2021-05-10 PROCEDURE — 99213 OFFICE O/P EST LOW 20 MIN: CPT | Performed by: SURGERY

## 2021-05-10 NOTE — PROGRESS NOTES
General Surgery History and Physical  [facility]    Patient's Name/Date of Birth: Lakeisha Asher / 1945    Date: May 10, 2021     Surgeon: Sonal Stout M.D.    PCP: Connie Leos MD     Chief Complaint: Bleeding from her stoma    HPI:   Lakeisha Asher is a 77 y.o. female who presents for evaluation of bleeding from her stoma. Timing is intermittent, radiation to peristomal, alleviated by none and started a week ago and severity is 8/10. Patient with a complex history of an end ileostomy status post multiple parastomal hernia repairs. Presents with bleeding around her stoma intermittently filling the bag. She denies any bleeding today. She is been discussing this over the phone with the stomal nurse who had recommended follow-up with me. Today in the office she has no active bleeding she has been using hemostatic powder in order to control the bleeding at times. Denies any dizziness shortness of breath chest pain or lightheadedness. She returns again with similar complaints of intermittent bleeding from her stoma. She states she thinks is from the flange around her ostomy appliance given her difficulty. She had try the silver nitrate sticks and they were too painful for her to do at home. She denies any lightheadedness she has had recent blood work that showed no evidence of anemia. I long discussion with her that it appears that she likely has another parastomal hernia. She not have any problems with obstruction or any pain at the site she states the bleeding is very intermittent.     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 Time spent reviewing past medical, surgical, social and family history, vitals, nursing assessment and images. No changes from above documented history.     Social History     Socioeconomic History    Marital status:      Spouse name: Not on file    Number of children: Not on file    Years of education: Not on file    Highest education level: Not on file   Occupational History    Not on file   Social Needs    Financial resource strain: Not on file    Food insecurity     Worry: Not on file     Inability: Not on file    Transportation needs     Medical: Not on file     Non-medical: Not on file   Tobacco Use    Smoking status: Never Smoker    Smokeless tobacco: Never Used   Substance and Sexual Activity    Alcohol use: Yes     Comment: rarely     Drug use: No    Sexual activity: Not Currently   Lifestyle    Physical activity     Days per week: Not on file     Minutes per session: Not on file    Stress: Not on file   Relationships    Social connections     Talks on phone: Not on file     Gets together: Not on file     Attends Sabianist service: Not on file     Active member of club or organization: Not on file     Attends meetings of clubs or organizations: Not on file     Relationship status: Not on file    Intimate partner violence     Fear of current or ex partner: Not on file     Emotionally abused: Not on file     Physically abused: Not on file     Forced sexual activity: Not on file   Other Topics Concern    Not on file   Social History Narrative    Not on file       I have reviewed relevant labs from this admission and interpretation is included in my assessment and plan    Review of Systems    A complete 10 system review was performed and are otherwise negative unless mentioned in the above HPI. Specific negatives are listed below but may not include all those reviewed.     General ROS: negative obtundation, AMS  ENT ROS: negative rhinorrhea, epistaxis  Allergy and Immunology ROS: negative itchy/watery eyes or nasal congestion  Hematological and Lymphatic ROS: negative spontaneous bleeding or bruising  Endocrine ROS: negative  lethargy, mood swings, palpitations or polydipsia/polyuria  Respiratory ROS: negative sputum changes, stridor, tachypnea or wheezing  Cardiovascular ROS: negative for - loss of consciousness, murmur or orthopnea  Gastrointestinal ROS: negative for - hematochezia or hematemesis  Genito-Urinary ROS: negative for -  genital discharge or hematuria  Musculoskeletal ROS: negative for - focal weakness, gangrene  Psych/Neuro ROS: negative for - visual or auditory hallucinations, suicidal ideation    Physical exam:   BP (!) 171/72   Pulse 58   Temp 97.6 °F (36.4 °C) (Temporal)   Ht 5' 3.5\" (1.613 m)   Wt 241 lb (109.3 kg)   SpO2 97%   BMI 42.02 kg/m²   General appearance:  NAD, appears stated age  Head: NCAT, PERRLA, EOMI, red conjunctiva  Neck: supple, no masses, trachea midline  Lungs: Equal chest rise bilateral, no retractions, no wheezing  Heart: Reg rate  Abdomen: soft, nontender, stoma appears healthy and viable with no evidence of bleeding or blood in the bag, nondistended  Skin; warm and dry, no cyanosis  Gu: no cva tenderness  Extremities: atraumatic, no focal motor deficits, no open wounds  Psych: No tremor, visual hallucinations      Radiology: I reviewed relevant abdominal imaging from this admission and that available in the EMR     Assessment:  Saintclair Devoid is a 77 y.o. 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

## 2021-07-09 ENCOUNTER — HOSPITAL ENCOUNTER (OUTPATIENT)
Dept: WOUND CARE | Age: 76
Discharge: HOME | End: 2021-07-09
Payer: MEDICARE

## 2021-07-09 PROCEDURE — 99212 OFFICE O/P EST SF 10 MIN: CPT

## 2021-07-09 NOTE — PROGRESS NOTES
adjustments to pouch size/system/skin care/accessory addition or deletion. [x]   2   Assess Complete Ostomy tab in Navigator for assessment of; stoma status, peristomal skin, presence of hernia/stool consistency/diet/related medications   Complex adjustments to pouch size/pouch system, new stoma pattern, accessory addition/deletion. 3 or more complex adjustments to pouch size/system/skin care/accessory addition or deletion. Observe patient/caregiver with hands-on care. Assess patient/patient abdomen for optimal pre-marked stoma site. Assess patient abdomen for type of hernia belt/accessory needed. []   3         Ambulation Status Documented in [facility] Clinical Note  Status Definition Points   Independent Independently able to ambulate. Fully able (without any assistance) to get on/off exam table/chair. [x]   0   Minimal Physical Assistance Requires physical assistance of one person to ambulate and/or position patient to be examined. Includes necessary physical assistance to position lower extremities on/off stool. []   1   Moderate Physical Assistance Requires at least one staff member to physically assist patient in ambulating into treatment room, and/or on off chair/bed. Requires assistance to bathroom. []   2   Full Assistance Requires assistance of at least two staff members to transfer patient into treatment room and/or on/off bed/chair. \"Total Transfer\". Unable to use bathroom requires bedside commode and/or bedpan []   3       Teaching Effort Documented in [facility] Clinical Note  Effort Definition Points   No Teaching  []   0   General Initial/Simple lesson:  Assess readiness to learn, assess patient learning style to determine educational flow/special needs for learning. Teaching related to 1-3 topics  Documentation in [facility] completed. []   1   Intermediate Assess readiness to learn, assess patient learning style to determine educational flow/special needs for learning.   Teaching related to 3-4 topics. Hernia belt application and care considerations  Documentation in [facility] completed. [x]   2   Complex Assess readiness to learn, assess patient learning style to determine educational flow/special needs for learning. Teaching of greater than 5 additional topics   Pre-operative ostomy education with review of written resources for patient/family/caregiver as needed. Demonstration/return demonstration of ostomy irrigation  Documentation in [facility] completed. []   3     Patient Assessment and Planning in [facility] Clinical Note   Planning Definition Points   Simple Simple pouch change procedure completed and reviewed with patient/family/caregiver   Documentation in [facility] completed. []   1   Intermediate Moderate level of follow-up needs:   Pouch change/discharge procedure revised and reviewed with patient/caregiver. Communications with outside resources; i.e. Telephone calls to Surgeon/ PCP, family/caregiver, home health, ECF. Documentation in [facility] completed. [x]   2   Complex Complex level of instructions/changes:   Family/Caregiver learning/demonstration/return demonstration visit. Pouching/discharge procedure revised/reviewed with patient/family/caregiver. Contact with outside resources; i.e. communication with Surgeon/ PCP, home health, ECF. Contact/referral to ostomy appliance supplier for new or additional products. Review when to call [facility] or schedule follow-up visit. Referral to Emergency Department   [facility]. []   3       Is this the Patient's First Visit with [facility] @ [facility]? Yes    Is this Patient Established to this [facility] within the last 3 years?    Yes             Clinical Level of Care      Points  0-3  Level 1 []     Points  4-6  Level 2 [x]     Points  7-8  Level 3 []     Points  9-10  Level 4 []     Points  11-12  Level 5 []       Electronically signed by Yanet Kearney RN on 7/9/2021 at 3:14 PM        Obed Wilkes RN 7/9/2021 3:04 PM

## 2021-12-22 ENCOUNTER — HOSPITAL ENCOUNTER (OUTPATIENT)
Dept: MAMMOGRAPHY | Age: 76
Discharge: HOME | End: 2021-12-24
Payer: MEDICARE

## 2021-12-22 VITALS — HEIGHT: 63 IN | BODY MASS INDEX: 43.41 KG/M2 | WEIGHT: 245 LBS

## 2021-12-22 DIAGNOSIS — Z12.31 ENCOUNTER FOR SCREENING MAMMOGRAM FOR MALIGNANT NEOPLASM OF BREAST: ICD-10-CM

## 2021-12-22 PROCEDURE — 77067 SCR MAMMO BI INCL CAD: CPT

## 2022-08-06 ENCOUNTER — HOSPITAL ENCOUNTER (EMERGENCY)
Age: 77
Discharge: LEFT BEFORE DISCHARGE | End: 2022-08-06

## 2022-08-06 VITALS
RESPIRATION RATE: 20 BRPM | OXYGEN SATURATION: 96 % | DIASTOLIC BLOOD PRESSURE: 89 MMHG | HEART RATE: 89 BPM | BODY MASS INDEX: 44.11 KG/M2 | WEIGHT: 249 LBS | TEMPERATURE: 98.7 F | SYSTOLIC BLOOD PRESSURE: 182 MMHG

## 2022-08-06 PROCEDURE — 4500000002 HC ER NO CHARGE

## 2022-08-06 ASSESSMENT — LIFESTYLE VARIABLES: HOW OFTEN DO YOU HAVE A DRINK CONTAINING ALCOHOL: MONTHLY OR LESS

## 2022-08-06 ASSESSMENT — PAIN - FUNCTIONAL ASSESSMENT: PAIN_FUNCTIONAL_ASSESSMENT: 0-10

## 2022-08-06 ASSESSMENT — PAIN DESCRIPTION - ORIENTATION: ORIENTATION: RIGHT;POSTERIOR

## 2022-08-06 ASSESSMENT — PAIN SCALES - GENERAL: PAINLEVEL_OUTOF10: 9

## 2022-08-06 ASSESSMENT — PAIN DESCRIPTION - LOCATION: LOCATION: NECK

## 2022-08-07 NOTE — ED NOTES
Department of Emergency Medicine  FIRST PROVIDER TRIAGE NOTE             Independent MLP           8/6/22  9:19 PM EDT    Date of Encounter: 8/6/22   MRN: [id number]      HPI: Brian Smith is a 69 y.o. female who presents to the ED for neck pain, cervical muscle spasms, headache. Pain radiates to right arm. Started today at 1400. Denies injury. ROS: Negative for dizziness, lightheadedness, weakness or syncope. PE: Gen Appearance/Constitutional: alert  HEENT: NC/NT. PERRLA,  Airway patent. Neck: supple  CV: regular rate  Pulm: respirations easy & unlabored  Musculoskeletal: moves all extremities x 4    There were no vitals filed for this visit. Past medical history, surgical history, and medications reviewed. Initial Plan of Care: All treatment areas within department are currently occupied. Plan to order/initiate the following while awaiting opening in ED: imaging studies. Initiate treatment/testing, proceed to treatment area when bed available for ED Attending/MLP to continue care.     Electronically signed by WOLFGANG Campuzano CNP   DD: 8/6/22           WOLFGANG Campuzano CNP  08/06/22 [id number]

## 2022-10-21 ENCOUNTER — TRANSCRIBE ORDERS (OUTPATIENT)
Dept: ADMINISTRATIVE | Age: 77
End: 2022-10-21

## 2022-10-21 DIAGNOSIS — Z12.31 ENCOUNTER FOR SCREENING MAMMOGRAM FOR MALIGNANT NEOPLASM OF BREAST: Primary | ICD-10-CM

## 2022-12-27 ENCOUNTER — HOSPITAL ENCOUNTER (OUTPATIENT)
Dept: MAMMOGRAPHY | Age: 77
Discharge: HOME | End: 2022-12-29
Payer: MEDICARE

## 2022-12-27 VITALS — BODY MASS INDEX: 41.99 KG/M2 | HEIGHT: 63 IN | WEIGHT: 237 LBS

## 2022-12-27 DIAGNOSIS — Z12.31 ENCOUNTER FOR SCREENING MAMMOGRAM FOR MALIGNANT NEOPLASM OF BREAST: ICD-10-CM

## 2022-12-27 PROCEDURE — 77067 SCR MAMMO BI INCL CAD: CPT

## 2023-03-08 ENCOUNTER — HOSPITAL ENCOUNTER (EMERGENCY)
Age: 78
Discharge: HOME | End: 2023-03-08
Attending: STUDENT IN AN ORGANIZED HEALTH CARE EDUCATION/TRAINING PROGRAM
Payer: MEDICARE

## 2023-03-08 ENCOUNTER — APPOINTMENT (OUTPATIENT)
Dept: CT IMAGING | Age: 78
End: 2023-03-08
Payer: MEDICARE

## 2023-03-08 VITALS
OXYGEN SATURATION: 99 % | HEART RATE: 72 BPM | SYSTOLIC BLOOD PRESSURE: 152 MMHG | HEIGHT: 64 IN | TEMPERATURE: 97.5 F | DIASTOLIC BLOOD PRESSURE: 97 MMHG | BODY MASS INDEX: 40.46 KG/M2 | WEIGHT: 237 LBS | RESPIRATION RATE: 16 BRPM

## 2023-03-08 DIAGNOSIS — R31.9 URINARY TRACT INFECTION WITH HEMATURIA, SITE UNSPECIFIED: Primary | ICD-10-CM

## 2023-03-08 DIAGNOSIS — N30.91 HEMORRHAGIC CYSTITIS: ICD-10-CM

## 2023-03-08 DIAGNOSIS — N39.0 URINARY TRACT INFECTION WITH HEMATURIA, SITE UNSPECIFIED: Primary | ICD-10-CM

## 2023-03-08 LAB
ALBUMIN SERPL-MCNC: 4 G/DL (ref 3.5–5.2)
ALP BLD-CCNC: 110 U/L (ref 35–104)
ALT SERPL-CCNC: 50 U/L (ref 0–32)
ANION GAP SERPL CALCULATED.3IONS-SCNC: 13 MMOL/L (ref 7–16)
AST SERPL-CCNC: 62 U/L (ref 0–31)
BACTERIA: ABNORMAL /HPF
BASOPHILS ABSOLUTE: 0.07 E9/L (ref 0–0.2)
BASOPHILS RELATIVE PERCENT: 0.8 % (ref 0–2)
BILIRUB SERPL-MCNC: 0.7 MG/DL (ref 0–1.2)
BILIRUBIN URINE: ABNORMAL
BLOOD, URINE: ABNORMAL
BUN BLDV-MCNC: 16 MG/DL (ref 6–23)
CALCIUM SERPL-MCNC: 10.2 MG/DL (ref 8.6–10.2)
CHLORIDE BLD-SCNC: 101 MMOL/L (ref 98–107)
CLARITY: CLEAR
CO2: 25 MMOL/L (ref 22–29)
COLOR: YELLOW
CREAT SERPL-MCNC: 1 MG/DL (ref 0.5–1)
EOSINOPHILS ABSOLUTE: 0.23 E9/L (ref 0.05–0.5)
EOSINOPHILS RELATIVE PERCENT: 2.7 % (ref 0–6)
GFR SERPL CREATININE-BSD FRML MDRD: 58 ML/MIN/1.73
GLUCOSE BLD-MCNC: 202 MG/DL (ref 74–99)
GLUCOSE URINE: NEGATIVE MG/DL
HCT VFR BLD CALC: 44.6 % (ref 34–48)
HEMOGLOBIN: 14.8 G/DL (ref 11.5–15.5)
IMMATURE GRANULOCYTES #: 0.05 E9/L
IMMATURE GRANULOCYTES %: 0.6 % (ref 0–5)
INR BLD: 1.3
KETONES, URINE: NEGATIVE MG/DL
LEUKOCYTE ESTERASE, URINE: ABNORMAL
LYMPHOCYTES ABSOLUTE: 1.3 E9/L (ref 1.5–4)
LYMPHOCYTES RELATIVE PERCENT: 15.2 % (ref 20–42)
MCH RBC QN AUTO: 29.8 PG (ref 26–35)
MCHC RBC AUTO-ENTMCNC: 33.2 % (ref 32–34.5)
MCV RBC AUTO: 89.9 FL (ref 80–99.9)
MONOCYTES ABSOLUTE: 0.79 E9/L (ref 0.1–0.95)
MONOCYTES RELATIVE PERCENT: 9.2 % (ref 2–12)
NEUTROPHILS ABSOLUTE: 6.13 E9/L (ref 1.8–7.3)
NEUTROPHILS RELATIVE PERCENT: 71.5 % (ref 43–80)
NITRITE, URINE: NEGATIVE
PDW BLD-RTO: 13.9 FL (ref 11.5–15)
PH UA: 5 (ref 5–9)
PLATELET # BLD: 247 E9/L (ref 130–450)
PMV BLD AUTO: 9.6 FL (ref 7–12)
POTASSIUM REFLEX MAGNESIUM: 4.2 MMOL/L (ref 3.5–5)
PROTEIN UA: 100 MG/DL
PROTHROMBIN TIME: 14.2 SEC (ref 9.3–12.4)
RBC # BLD: 4.96 E12/L (ref 3.5–5.5)
RBC UA: ABNORMAL /HPF (ref 0–2)
SODIUM BLD-SCNC: 139 MMOL/L (ref 132–146)
SPECIFIC GRAVITY UA: 1.02 (ref 1–1.03)
TOTAL PROTEIN: 7.9 G/DL (ref 6.4–8.3)
UROBILINOGEN, URINE: 0.2 E.U./DL
WBC # BLD: 8.6 E9/L (ref 4.5–11.5)
WBC UA: ABNORMAL /HPF (ref 0–5)

## 2023-03-08 PROCEDURE — 85610 PROTHROMBIN TIME: CPT

## 2023-03-08 PROCEDURE — 99285 EMERGENCY DEPT VISIT HI MDM: CPT

## 2023-03-08 PROCEDURE — 81001 URINALYSIS AUTO W/SCOPE: CPT

## 2023-03-08 PROCEDURE — 36415 COLL VENOUS BLD VENIPUNCTURE: CPT

## 2023-03-08 PROCEDURE — 6360000004 HC RX CONTRAST MEDICATION: Performed by: RADIOLOGY

## 2023-03-08 PROCEDURE — 80053 COMPREHEN METABOLIC PANEL: CPT

## 2023-03-08 PROCEDURE — 74177 CT ABD & PELVIS W/CONTRAST: CPT

## 2023-03-08 PROCEDURE — 85025 COMPLETE CBC W/AUTO DIFF WBC: CPT

## 2023-03-08 RX ORDER — CEPHALEXIN 500 MG/1
500 CAPSULE ORAL 4 TIMES DAILY
Qty: 28 CAPSULE | Refills: 0 | Status: SHIPPED | OUTPATIENT
Start: 2023-03-08

## 2023-03-08 RX ADMIN — IOPAMIDOL 50 ML: 755 INJECTION, SOLUTION INTRAVENOUS at 13:10

## 2023-03-08 ASSESSMENT — PAIN - FUNCTIONAL ASSESSMENT
PAIN_FUNCTIONAL_ASSESSMENT: NONE - DENIES PAIN
PAIN_FUNCTIONAL_ASSESSMENT: NONE - DENIES PAIN

## 2023-03-08 NOTE — ED PROVIDER NOTES
[address] ENCOUNTER      Pt Name: Oneida Saavedra  MRN: [id number]  [place] 1945  Date of evaluation: 3/8/2023  Provider: Ara Alanis [id number]       Chief Complaint   Patient presents with    Hematuria     Blood in urine. Denies burning or frequency. X4 days. HISTORY OF PRESENT ILLNESS    Oneida Saavedra is a 69 y.o. female who presents to the emergency department with herself for hematuria. Patient states that she is currently on Eliquis for history of atrial fibrillation. She does report that she held the Eliquis this morning due to the hematuria. She states this has been ongoing for the past 4 days. She never had this in the past.  She has had GI bleeds from her ostomy site in the past secondary to the Eliquis but is not having any blood in the ostomy today. She denies any abdominal pain or recent injuries or falls. She otherwise feels well has not been feeling lightheaded dizzy and no chest pain or shortness of breath. Due to the persistent hematuria this is causing her to present to the emergency department for further evaluation. The history is provided by the patient. Nursing Notes were reviewed. REVIEW OF SYSTEMS    (2-9 systems for level 4, 10 or more for level 5)   CONSTITUTIONAL: Denies fever, sweats, chills. NEURO: Denies difficulty walking, numbness, weakness, tingling, headache. HEENT: Denies sore throat, rhinorrhea, epistaxis, changes in vision. CARDIO: Denies chest pain, palpitations. PULM: Denies shortness of breath, cough. GI: Denies abdominal pain, nausea, vomiting, diarrhea, constipation, melena, hematochezia. : Endorses hematuria. Denies painful urination, frequency. MSK: Denies recent trauma. SKIN: Denies rash, lesions. ENDOCRINE: Denies unexpected weight-loss. HEME: Denies bleeding disorder.      PAST MEDICAL HISTORY     Past Medical History: Diagnosis Date    Arthritis     Atrial fibrillation ([facility]     History of blood transfusion     History of cardiovascular stress test 06/2015    lexiscan    Hypertension     Sciatica     Right    Spinal headache     Tachycardia     on metoprolol ,paroxysmal, usually at     Thyroid disease          SURGICAL HISTORY       Past Surgical History:   Procedure Laterality Date    COLONOSCOPY      COLONOSCOPY N/A 6/27/2018    COLONOSCOPY CONTROL HEMORRHAGE performed by Josselin Ellis MD at [facility], COLON, [facility] N/A 7/21/2020    PARASTOMAL HERNIA REPAIR WITH MESH, LAPAROSCOPIC ROBOTIC XI ASSISTED performed by Melany Munguia MD at [facility]      OTHER SURGICAL HISTORY Right 05/09/2017    right TKA    [place] COLONOSCOPY FLX DX W/COLLJ SPEC WHEN PFRMD N/A 6/26/2018    COLONOSCOPY performed by Deep Renteria MD at [facility] W/PROCTECTOMY W/ILEOSTOMY N/A 7/2/2018    LAPAROSCOPIC POSS OPEN SUBTOTAL COLECTOMY POSS ILEOSTOMY performed by Melany Munguia MD at [facility] N/A 1/22/2019    SIGMOIDOSCOPY FLEXIBLE performed by Melany Munguia MD at [facility] N/A 7/23/2020    LAPAROSCOPIC REPAIR OF PARASTOMAL HERNIA WITH REVISION OF ILEOSTOMY performed by Melany Munugia MD at [facility] N/A 9/1/2020    LAPAROSCOPIC REVISION OF ILEOSTOMY performed by Melany Munguia MD at [address] Left 6/11/2019    LEFT TOTAL KNEE ARTHROPLASTY (Yoder Mates) performed by Nik Martinez DO at [facility]       Discharge Medication List as of 3/8/2023  2:34 PM        CONTINUE these medications which have NOT CHANGED    Details   Multiple Vitamins-Minerals (ICAPS AREDS 2 PO) Take by mouthHistorical Med      Apixaban (ELIQUIS PO) Take by mouthHistorical Med DIGOXIN PO Take by mouthHistorical Med      celecoxib (CELEBREX) 200 MG capsule Take 200 mg by mouth every eveningHistorical Med      sotalol (BETAPACE) 80 MG tablet Take 1 tablet by mouth 2 times daily, Disp-60 tablet, R-3Normal      losartan-hydrochlorothiazide (HYZAAR) 50-12.5 MG per tablet Take 1 tablet by mouth daily      vitamin D-3 (CHOLECALCIFEROL) 5000 UNITS TABS Take 5,000 Units by mouth daily      levothyroxine (SYNTHROID) 50 MCG tablet Take 50 mcg by mouth DailyHistorical Med             ALLERGIES     Sulfa antibiotics, Talwin [pentazocine], Tetracyclines & related, and Bactrim [sulfamethoxazole-trimethoprim]    FAMILY HISTORY       Family History   Problem Relation Age of Onset    Cancer Mother     Cancer Father           SOCIAL HISTORY       Social History     Socioeconomic History    Marital status:      Spouse name: None    Number of children: None    Years of education: None    Highest education level: None   Tobacco Use    Smoking status: Never    Smokeless tobacco: Never   Vaping Use    Vaping Use: Never used   Substance and Sexual Activity    Alcohol use: Yes     Comment: rarely     Drug use: No    Sexual activity: Not Currently       SCREENINGS        Janice Coma Scale  Eye Opening: Spontaneous  Best Verbal Response: Oriented  Best Motor Response: Obeys commands  Janice Coma Scale Score: 15               PHYSICAL EXAM    (up to 7 for level 4, 8 or more for level 5)     ED Triage Vitals   BP Temp Temp Source Heart Rate Resp SpO2 Height Weight   03/08/23 0954 03/08/23 0954 03/08/23 0954 03/08/23 0954 03/08/23 0954 03/08/23 0954 03/08/23 0959 03/08/23 0959   (!) 168/85 97.5 °F (36.4 °C) Oral 67 18 97 % 5' 3.5\" (1.613 m) 237 lb (107.5 kg)       VS: As documented in the triage note from today's date and EMR flowsheet were reviewed. Gen: Well developed. No acute distress. Seated in bed. Appears nontoxic. Skin: Warm. Dry. Intact. No rashes or lesions.   Eyes: Pupils equally round and reactive to light. Clear sclera. HENT: Atraumatic appearance. Mucosal membranes moist. No oral lesions, uvula midline, airway patent. CV: Regular rate and regular rhythm. S1, S2. No pedal edema. Warm extremities. Resp: Nonlabored breathing Clear to auscultation bilaterally. No increased work of breathing. GI: Soft and nontender. No CVA tenderness Ba sign McBurney's point tenderness is negative. No rebound or guarding. Bowel sounds x4 present. Ostomy site in place with brown stool no blood or melena. MSK: Symmetric muscle bulk. No joint swelling in the extremities. Compartments are soft. Neurovascularly intact x4 extremities. Radial pulses +2 equal bilaterally. Neuro: Alert. Speech fluent. Moving all extremities. No focal deficits. Psych: Appropriate. K[place]. DIAGNOSTIC RESULTS       RADIOLOGY:   Non-plain film images such as CT, Ultrasound and MRI are read by the radiologist.       Interpretation per the Radiologist below, if available at the time of this note:    CT ABDOMEN PELVIS W IV CONTRAST Additional Contrast? None   Final Result   Presence of a presently nonobstructing calculus measuring 8 x 5 x 8 mm in the   renal pelvis of the left kidney.                ED BEDSIDE ULTRASOUND:   Performed by ED Physician - none    LABS:  Labs Reviewed   CBC WITH AUTO DIFFERENTIAL - Abnormal; Notable for the following components:       Result Value    Lymphocytes % 15.2 (*)     Lymphocytes Absolute 1.30 (*)     All other components within normal limits   COMPREHENSIVE METABOLIC PANEL W/ REFLEX TO MG FOR LOW K - Abnormal; Notable for the following components:    Glucose 202 (*)     Alkaline Phosphatase 110 (*)     ALT 50 (*)     AST 62 (*)     All other components within normal limits   PROTIME-INR - Abnormal; Notable for the following components:    Protime 14.2 (*)     All other components within normal limits   URINALYSIS WITH MICROSCOPIC - Abnormal; Notable for the following components:    Bilirubin Urine SMALL (*)     Blood, Urine LARGE (*)     Protein,  (*)     Leukocyte Esterase, Urine TRACE (*)     RBC, UA 5-10 (*)     Bacteria, UA RARE (*)     All other components within normal limits       All other labs were within normal range or not returned as of this dictation.    EMERGENCY DEPARTMENT COURSE/MDM:   Vitals:    Vitals:    03/08/23 0954 03/08/23 0959 03/08/23 1229   BP: (!) 168/85  (!) 152/97   Pulse: 67  72   Resp: 18  16   Temp: 97.5 °F (36.4 °C)     TempSrc: Oral     SpO2: 97%  99%   Weight:  237 lb (107.5 kg)    Height:  5' 3.5\" (1.613 m)        I reviewed the patient's triage vitals and they are hypertensive recommended to follow-up with primary physician for repeat checks.    Due to the above findings the following was ordered basic labs to include urinalysis and CT imaging of the abdomen pelvis with PT/INR.    Lab work revealing no leukocytosis making bacterial etiology less likely no signs of anemia.  No significant electrolyte derangements renal function is at baseline.  Patient is hyperglycemic although nonfasting she was recommended to follow-up with her primary physician regarding this as it could be new onset diabetes no evidence of DKA on examination.  Urinalysis does show large blood with rare bacteria and trace leuk esterase could be developing infection.  CT imaging is remarkable for nephrolithiasis although within the renal pelvis feel that this would less likely cause hematuria or any of these issues.  She is currently also pain-free and has never had pain with the symptoms.  I did consider having patient hold her blood thinning medication although she is not anemic only having 5-10 RBC in the UA I do believe the risks of holding her anticoagulation with active atrial fibrillation are riskier than continuing with minimal blood in the urine.  Believe that she may have the onset of hemorrhagic cystitis therefore she was placed on Keflex for UTI.  She does have a new transaminitis  compared to previous record research she has no right upper quadrant tenderness she was recommended to follow-up with this.  Patient was provided strict return precautions appropriate follow-up and discharged in stable condition.    Patient was counseled regarding labs, imaging, likely diagnosis, and plan. All questions were answered.    ED Medications administered this visit:    Medications   iopamidol (ISOVUE-370) 76 % injection 50 mL (50 mLs IntraVENous Given 3/8/23 1310)       New Prescriptions from this visit:    Discharge Medication List as of 3/8/2023  2:34 PM        START taking these medications    Details   cephALEXin (KEFLEX) 500 MG capsule Take 1 capsule by mouth 4 times daily, Disp-28 capsule, R-0Print             Follow-up:  Mitch Babb MD  [address] [facility] [address] [phone]    In 2 days        Final Impression:   1. Urinary tract infection with hematuria, site unspecified    2. Hemorrhagic cystitis          (Please note that portions of this note were completed with a voice recognition program.  Efforts were made to edit the dictations but occasionally words are mis-transcribed.)           Vaibhav Bates,   03/08/23 8404

## 2023-03-08 NOTE — DISCHARGE INSTRUCTIONS
You are diagnosed with the early start of urinary tract infection suspect this is hemorrhagic cystitis you do have left-sided kidney stones but they are within the pole which should not cause any issues. I would recommend you continue your anticoagulation as you are not having any significant bleeding should this change return immediately should you begin having abdominal pain new chest pain shortness of breath feeling as if you are going to pass out new fevers return immediately as well otherwise follow-up with your primary physician in the next 1 to 2 days take the Keflex 4 times daily until completion.

## 2023-03-29 ENCOUNTER — HOSPITAL ENCOUNTER (OUTPATIENT)
Age: 78
Discharge: HOME | End: 2023-03-31
Payer: MEDICARE

## 2023-03-29 ENCOUNTER — HOSPITAL ENCOUNTER (OUTPATIENT)
Dept: GENERAL RADIOLOGY | Age: 78
Discharge: HOME | End: 2023-03-31
Payer: MEDICARE

## 2023-03-29 DIAGNOSIS — N20.0 CALCULUS OF KIDNEY: ICD-10-CM

## 2023-03-29 PROCEDURE — 74018 RADEX ABDOMEN 1 VIEW: CPT

## 2023-06-24 LAB
BACTERIA UR CULT: ABNORMAL
BACTERIA UR CULT: ABNORMAL
ORGANISM: ABNORMAL

## 2023-06-29 RX ORDER — WARFARIN SODIUM 5 MG/1
5 TABLET ORAL
COMMUNITY

## 2023-06-29 RX ORDER — ACETAMINOPHEN 500 MG
500 TABLET ORAL EVERY 6 HOURS PRN
COMMUNITY

## 2023-07-04 ENCOUNTER — PREP FOR PROCEDURE (OUTPATIENT)
Dept: UROLOGY | Age: 78
End: 2023-07-04

## 2023-07-04 ENCOUNTER — ANESTHESIA EVENT (OUTPATIENT)
Dept: OPERATING ROOM | Age: 78
End: 2023-07-04
Payer: MEDICARE

## 2023-07-04 RX ORDER — SODIUM CHLORIDE 9 MG/ML
INJECTION, SOLUTION INTRAVENOUS PRN
Status: CANCELLED | OUTPATIENT
Start: 2023-07-04

## 2023-07-04 RX ORDER — SODIUM CHLORIDE 9 MG/ML
INJECTION, SOLUTION INTRAVENOUS CONTINUOUS
Status: CANCELLED | OUTPATIENT
Start: 2023-07-04

## 2023-07-04 RX ORDER — SODIUM CHLORIDE 0.9 % (FLUSH) 0.9 %
5-40 SYRINGE (ML) INJECTION PRN
Status: CANCELLED | OUTPATIENT
Start: 2023-07-04

## 2023-07-04 RX ORDER — SODIUM CHLORIDE 0.9 % (FLUSH) 0.9 %
5-40 SYRINGE (ML) INJECTION EVERY 12 HOURS SCHEDULED
Status: CANCELLED | OUTPATIENT
Start: 2023-07-04

## 2023-07-05 ENCOUNTER — HOSPITAL ENCOUNTER (OUTPATIENT)
Age: 78
Setting detail: OUTPATIENT SURGERY
Discharge: HOME | End: 2023-07-05
Attending: STUDENT IN AN ORGANIZED HEALTH CARE EDUCATION/TRAINING PROGRAM | Admitting: STUDENT IN AN ORGANIZED HEALTH CARE EDUCATION/TRAINING PROGRAM
Payer: MEDICARE

## 2023-07-05 ENCOUNTER — ANESTHESIA (OUTPATIENT)
Dept: OPERATING ROOM | Age: 78
End: 2023-07-05
Payer: MEDICARE

## 2023-07-05 ENCOUNTER — HOSPITAL ENCOUNTER (OUTPATIENT)
Dept: GENERAL RADIOLOGY | Age: 78
Setting detail: OUTPATIENT SURGERY
Discharge: HOME | End: 2023-07-07
Attending: STUDENT IN AN ORGANIZED HEALTH CARE EDUCATION/TRAINING PROGRAM
Payer: MEDICARE

## 2023-07-05 VITALS
HEIGHT: 64 IN | HEART RATE: 51 BPM | OXYGEN SATURATION: 98 % | RESPIRATION RATE: 16 BRPM | BODY MASS INDEX: 40.46 KG/M2 | SYSTOLIC BLOOD PRESSURE: 151 MMHG | TEMPERATURE: 96.8 F | DIASTOLIC BLOOD PRESSURE: 70 MMHG | WEIGHT: 237 LBS

## 2023-07-05 DIAGNOSIS — R52 PAIN: ICD-10-CM

## 2023-07-05 DIAGNOSIS — R31.0 GROSS HEMATURIA: ICD-10-CM

## 2023-07-05 DIAGNOSIS — N20.0 URIC ACID NEPHROLITHIASIS: ICD-10-CM

## 2023-07-05 LAB
INR BLD: 2.3
PROTHROMBIN TIME: 25.5 SEC (ref 9.3–12.4)

## 2023-07-05 PROCEDURE — 2720000010 HC SURG SUPPLY STERILE: Performed by: STUDENT IN AN ORGANIZED HEALTH CARE EDUCATION/TRAINING PROGRAM

## 2023-07-05 PROCEDURE — 6360000002 HC RX W HCPCS: Performed by: NURSE ANESTHETIST, CERTIFIED REGISTERED

## 2023-07-05 PROCEDURE — 74420 UROGRAPHY RTRGR +-KUB: CPT

## 2023-07-05 PROCEDURE — 3700000001 HC ADD 15 MINUTES (ANESTHESIA): Performed by: STUDENT IN AN ORGANIZED HEALTH CARE EDUCATION/TRAINING PROGRAM

## 2023-07-05 PROCEDURE — C1769 GUIDE WIRE: HCPCS | Performed by: STUDENT IN AN ORGANIZED HEALTH CARE EDUCATION/TRAINING PROGRAM

## 2023-07-05 PROCEDURE — 6360000004 HC RX CONTRAST MEDICATION: Performed by: STUDENT IN AN ORGANIZED HEALTH CARE EDUCATION/TRAINING PROGRAM

## 2023-07-05 PROCEDURE — 7100000011 HC PHASE II RECOVERY - ADDTL 15 MIN: Performed by: STUDENT IN AN ORGANIZED HEALTH CARE EDUCATION/TRAINING PROGRAM

## 2023-07-05 PROCEDURE — 3600000003 HC SURGERY LEVEL 3 BASE: Performed by: STUDENT IN AN ORGANIZED HEALTH CARE EDUCATION/TRAINING PROGRAM

## 2023-07-05 PROCEDURE — 3600000013 HC SURGERY LEVEL 3 ADDTL 15MIN: Performed by: STUDENT IN AN ORGANIZED HEALTH CARE EDUCATION/TRAINING PROGRAM

## 2023-07-05 PROCEDURE — C2617 STENT, NON-COR, TEM W/O DEL: HCPCS | Performed by: STUDENT IN AN ORGANIZED HEALTH CARE EDUCATION/TRAINING PROGRAM

## 2023-07-05 PROCEDURE — 3700000000 HC ANESTHESIA ATTENDED CARE: Performed by: STUDENT IN AN ORGANIZED HEALTH CARE EDUCATION/TRAINING PROGRAM

## 2023-07-05 PROCEDURE — 6360000002 HC RX W HCPCS: Performed by: NURSE PRACTITIONER

## 2023-07-05 PROCEDURE — 2500000003 HC RX 250 WO HCPCS: Performed by: NURSE ANESTHETIST, CERTIFIED REGISTERED

## 2023-07-05 PROCEDURE — 7100000010 HC PHASE II RECOVERY - FIRST 15 MIN: Performed by: STUDENT IN AN ORGANIZED HEALTH CARE EDUCATION/TRAINING PROGRAM

## 2023-07-05 PROCEDURE — C1894 INTRO/SHEATH, NON-LASER: HCPCS | Performed by: STUDENT IN AN ORGANIZED HEALTH CARE EDUCATION/TRAINING PROGRAM

## 2023-07-05 PROCEDURE — 85610 PROTHROMBIN TIME: CPT

## 2023-07-05 PROCEDURE — 2580000003 HC RX 258: Performed by: NURSE PRACTITIONER

## 2023-07-05 PROCEDURE — 36415 COLL VENOUS BLD VENIPUNCTURE: CPT

## 2023-07-05 PROCEDURE — 2709999900 HC NON-CHARGEABLE SUPPLY: Performed by: STUDENT IN AN ORGANIZED HEALTH CARE EDUCATION/TRAINING PROGRAM

## 2023-07-05 DEVICE — URETERAL STENT
Type: IMPLANTABLE DEVICE | Site: URETER | Status: FUNCTIONAL
Brand: PERCUFLEX™

## 2023-07-05 RX ORDER — PROPOFOL 10 MG/ML
INJECTION, EMULSION INTRAVENOUS PRN
Status: DISCONTINUED | OUTPATIENT
Start: 2023-07-05 | End: 2023-07-05 | Stop reason: SDUPTHER

## 2023-07-05 RX ORDER — SODIUM CHLORIDE 9 MG/ML
INJECTION, SOLUTION INTRAVENOUS CONTINUOUS
Status: DISCONTINUED | OUTPATIENT
Start: 2023-07-05 | End: 2023-07-05 | Stop reason: HOSPADM

## 2023-07-05 RX ORDER — SODIUM CHLORIDE 0.9 % (FLUSH) 0.9 %
5-40 SYRINGE (ML) INJECTION EVERY 12 HOURS SCHEDULED
Status: CANCELLED | OUTPATIENT
Start: 2023-07-05

## 2023-07-05 RX ORDER — SODIUM CHLORIDE 9 MG/ML
INJECTION, SOLUTION INTRAVENOUS PRN
Status: DISCONTINUED | OUTPATIENT
Start: 2023-07-05 | End: 2023-07-05 | Stop reason: HOSPADM

## 2023-07-05 RX ORDER — SODIUM CHLORIDE 0.9 % (FLUSH) 0.9 %
5-40 SYRINGE (ML) INJECTION PRN
Status: CANCELLED | OUTPATIENT
Start: 2023-07-05

## 2023-07-05 RX ORDER — SODIUM CHLORIDE 0.9 % (FLUSH) 0.9 %
5-40 SYRINGE (ML) INJECTION EVERY 12 HOURS SCHEDULED
Status: DISCONTINUED | OUTPATIENT
Start: 2023-07-05 | End: 2023-07-05 | Stop reason: HOSPADM

## 2023-07-05 RX ORDER — OXYCODONE HYDROCHLORIDE AND ACETAMINOPHEN 5; 325 MG/1; MG/1
1 TABLET ORAL EVERY 6 HOURS PRN
Qty: 12 TABLET | Refills: 0 | Status: SHIPPED | OUTPATIENT
Start: 2023-07-05 | End: 2023-07-08

## 2023-07-05 RX ORDER — FENTANYL CITRATE 50 UG/ML
INJECTION, SOLUTION INTRAMUSCULAR; INTRAVENOUS PRN
Status: DISCONTINUED | OUTPATIENT
Start: 2023-07-05 | End: 2023-07-05 | Stop reason: SDUPTHER

## 2023-07-05 RX ORDER — SODIUM CHLORIDE 0.9 % (FLUSH) 0.9 %
5-40 SYRINGE (ML) INJECTION PRN
Status: DISCONTINUED | OUTPATIENT
Start: 2023-07-05 | End: 2023-07-05 | Stop reason: HOSPADM

## 2023-07-05 RX ORDER — ONDANSETRON 2 MG/ML
4 INJECTION INTRAMUSCULAR; INTRAVENOUS
Status: CANCELLED | OUTPATIENT
Start: 2023-07-05 | End: 2023-07-06

## 2023-07-05 RX ORDER — SODIUM CHLORIDE 9 MG/ML
INJECTION, SOLUTION INTRAVENOUS PRN
Status: CANCELLED | OUTPATIENT
Start: 2023-07-05

## 2023-07-05 RX ORDER — MIDAZOLAM HYDROCHLORIDE 1 MG/ML
INJECTION INTRAMUSCULAR; INTRAVENOUS PRN
Status: DISCONTINUED | OUTPATIENT
Start: 2023-07-05 | End: 2023-07-05 | Stop reason: SDUPTHER

## 2023-07-05 RX ORDER — DIPHENHYDRAMINE HYDROCHLORIDE 50 MG/ML
INJECTION INTRAMUSCULAR; INTRAVENOUS PRN
Status: DISCONTINUED | OUTPATIENT
Start: 2023-07-05 | End: 2023-07-05 | Stop reason: SDUPTHER

## 2023-07-05 RX ORDER — FENTANYL CITRATE 50 UG/ML
50 INJECTION, SOLUTION INTRAMUSCULAR; INTRAVENOUS EVERY 5 MIN PRN
Status: CANCELLED | OUTPATIENT
Start: 2023-07-05

## 2023-07-05 RX ORDER — FENTANYL CITRATE 50 UG/ML
25 INJECTION, SOLUTION INTRAMUSCULAR; INTRAVENOUS EVERY 5 MIN PRN
Status: CANCELLED | OUTPATIENT
Start: 2023-07-05

## 2023-07-05 RX ORDER — LIDOCAINE HYDROCHLORIDE 20 MG/ML
INJECTION, SOLUTION EPIDURAL; INFILTRATION; INTRACAUDAL; PERINEURAL PRN
Status: DISCONTINUED | OUTPATIENT
Start: 2023-07-05 | End: 2023-07-05 | Stop reason: SDUPTHER

## 2023-07-05 RX ADMIN — PROPOFOL 30 MG: 10 INJECTION, EMULSION INTRAVENOUS at 11:38

## 2023-07-05 RX ADMIN — PROPOFOL 30 MG: 10 INJECTION, EMULSION INTRAVENOUS at 11:47

## 2023-07-05 RX ADMIN — SODIUM CHLORIDE: 9 INJECTION, SOLUTION INTRAVENOUS at 11:20

## 2023-07-05 RX ADMIN — PROPOFOL 20 MG: 10 INJECTION, EMULSION INTRAVENOUS at 11:51

## 2023-07-05 RX ADMIN — DIPHENHYDRAMINE HYDROCHLORIDE 25 MG: 50 INJECTION, SOLUTION INTRAMUSCULAR; INTRAVENOUS at 11:23

## 2023-07-05 RX ADMIN — PROPOFOL 20 MG: 10 INJECTION, EMULSION INTRAVENOUS at 11:34

## 2023-07-05 RX ADMIN — FENTANYL CITRATE 25 MCG: 50 INJECTION, SOLUTION INTRAMUSCULAR; INTRAVENOUS at 11:35

## 2023-07-05 RX ADMIN — PROPOFOL 20 MG: 10 INJECTION, EMULSION INTRAVENOUS at 11:31

## 2023-07-05 RX ADMIN — LIDOCAINE HYDROCHLORIDE 40 MG: 20 INJECTION, SOLUTION EPIDURAL; INFILTRATION; INTRACAUDAL; PERINEURAL at 11:28

## 2023-07-05 RX ADMIN — PROPOFOL 50 MG: 10 INJECTION, EMULSION INTRAVENOUS at 11:28

## 2023-07-05 RX ADMIN — PROPOFOL 30 MG: 10 INJECTION, EMULSION INTRAVENOUS at 11:36

## 2023-07-05 RX ADMIN — PROPOFOL 30 MG: 10 INJECTION, EMULSION INTRAVENOUS at 11:40

## 2023-07-05 RX ADMIN — PROPOFOL 20 MG: 10 INJECTION, EMULSION INTRAVENOUS at 11:43

## 2023-07-05 RX ADMIN — WATER 2000 MG: 1 INJECTION INTRAMUSCULAR; INTRAVENOUS; SUBCUTANEOUS at 11:29

## 2023-07-05 RX ADMIN — FENTANYL CITRATE 25 MCG: 50 INJECTION, SOLUTION INTRAMUSCULAR; INTRAVENOUS at 11:40

## 2023-07-05 RX ADMIN — MIDAZOLAM 2 MG: 1 INJECTION INTRAMUSCULAR; INTRAVENOUS at 11:23

## 2023-07-05 RX ADMIN — PROPOFOL 20 MG: 10 INJECTION, EMULSION INTRAVENOUS at 11:32

## 2023-07-05 RX ADMIN — PROPOFOL 30 MG: 10 INJECTION, EMULSION INTRAVENOUS at 11:30

## 2023-07-05 RX ADMIN — FENTANYL CITRATE 25 MCG: 50 INJECTION, SOLUTION INTRAMUSCULAR; INTRAVENOUS at 11:36

## 2023-07-05 RX ADMIN — FENTANYL CITRATE 25 MCG: 50 INJECTION, SOLUTION INTRAMUSCULAR; INTRAVENOUS at 11:45

## 2023-07-05 ASSESSMENT — PAIN - FUNCTIONAL ASSESSMENT: PAIN_FUNCTIONAL_ASSESSMENT: 0-10

## 2023-07-05 ASSESSMENT — PAIN SCALES - GENERAL: PAINLEVEL_OUTOF10: 0

## 2023-07-05 NOTE — DISCHARGE INSTRUCTIONS
Laser Lithotripsy    These are general instructions for you to follow after your surgery. Your doctor or a member of his or her staff will outline any additional or special instructions that pertain to you. What is a Laser Lithotripsy? A laser lithotripsy is a method for using lasers to remove stones that are located in the urinary tract. This could include stones in the bladder, kidneys, ureters, or urethra. A small flexible camera called a ureteroscope is inserted into the urethra and up the ureter until it reaches the location of the kidney stone. The doctor can then use a laser to break up a kidney stone and a small basket to remove small stones or stone fragments. A ureteral stent is often left in place after the procedure to help with healing. What are the advantages of laser lithotripsy? Laser lithotripsy is a minimally invasive procedure, and does not involve any incisions. It has been shown to be effective no matter the size, location, and/ or hardness of the stone. It also reduces the risk of steinstrasse (where several stones fragments line up in the ureter and block urine flow). How long does surgery take? Surgery will take 1-2 hours to complete. Will I have to stay in the hospital? No. This is an outpatient procedure, so you should be able to go home the same day that the procedure is performed. Are there risks with a laser lithotripsy procedure? There are risks with any surgical procedure. Risks of laser lithotripsy in particular include pain, blood in the urine, difficulty urinating, injury to the bladder or ureters when removing the stone, and infection. General anesthesia also has the risk of breathing problems, heart attack and stroke in patients who are high risk. Home Going Instructions: Activity: You are encouraged to walk every day, increasing the distance each day. You may go up and down steps, but please rest when you are tired.    - Do NOT drive for 2-3

## 2023-07-05 NOTE — H&P
Kirk Ordonez is a 69 y.o. female with left renal calculus. Here for left j Zandra. No new changes. Discussed all options. Will proceed forward with left J Zandra  Please see paper h and p for full details.      Past Medical History:   Diagnosis Date    Arthritis     Atrial fibrillation ([address])     History of blood transfusion     History of cardiovascular stress test 06/2015    lexiscan    Hypertension     Sciatica     Right    Spinal headache     Tachycardia     on metoprolol ,paroxysmal, usually at [facility]    Thyroid disease        Past Surgical History:   Procedure Laterality Date    COLONOSCOPY      COLONOSCOPY N/A 6/27/2018    COLONOSCOPY CONTROL HEMORRHAGE performed by Meme Abarca MD at [facility] [place], [address] N/A 7/21/2020    PARASTOMAL HERNIA REPAIR WITH MESH, LAPAROSCOPIC ROBOTIC XI ASSISTED performed by Sawyer Zhao MD at [address]      OTHER SURGICAL HISTORY Right 05/09/2017    right TKA    [place] COLONOSCOPY FLX DX W/COLLJ SPEC WHEN PFRMD N/A 6/26/2018    COLONOSCOPY performed by Tomas Bean MD at [facility] W/PROCTECTOMY W/ILEOSTOMY N/A 7/2/2018    LAPAROSCOPIC POSS OPEN SUBTOTAL COLECTOMY POSS ILEOSTOMY performed by Sawyer Zhao MD at [facility] N/A 1/22/2019    SIGMOIDOSCOPY FLEXIBLE performed by Sawyer Zhao MD at [facility] N/A 7/23/2020    LAPAROSCOPIC REPAIR OF PARASTOMAL HERNIA WITH REVISION OF ILEOSTOMY performed by Sawyer Zhao MD at [facility] N/A 9/1/2020    LAPAROSCOPIC REVISION OF ILEOSTOMY [facility] MD [facility] [address] [facility] 6/11/2019    LEFT TOTAL KNEE ARTHROPLASTY (LUIS ENRIQUE) performed by Ekta Harrison DO at [facility]         Family History   Problem Relation Age of Onset    Cancer Mother

## 2023-07-05 NOTE — OP NOTE
Operative Note      Patient: Aleksey Gill  YOB: 1945  MRN: [id number]    Date of Procedure: 7/5/2023    Pre-Op Diagnosis Codes:     * Uric acid nephrolithiasis [N20.0]     * Gross hematuria [R31.0]   Left Renal calculus    Post-Op Diagnosis: Same       Procedure(s):  CYSTOSCOPY RETROGRADE PYELOGRAM URETEROSCOPY J STENT LASER LITHOTRIPSY LEFT    Surgeon(s):  Jung Mehta MD    Assistant:   * No surgical staff found *    Anesthesia: Monitor Anesthesia Care    Estimated Blood Loss (mL): Minimal    Complications: None    Specimens:   * No specimens in log *    Implants:  Implant Name Type Inv. Item Serial No.  Lot No. LRB No. Used Action   [facility] PERCFLX FIRM DUROMETER DBL PGTL TAPR - [id number]  STENT URET 6FR L26CM PERCFLX FIRM DUROMETER DBL PGTL TAPR  [employer] [facility] UROLOGY- [id number] Left 1 Implanted         Drains:   Colostomy RUQ Loop (Active)       Findings: see op note        Detailed Description of Procedure:       INDICATIONS FOR PROCEDURE:  The patient has a left renal calculus and is having severe pain. The patient presents for ureteroscopy, laser lithotripsy, understands the risks, benefits and alternatives of the procedure, signed informed consent, and agreed to proceed. DESCRIPTION OF PROCEDURE: The patient was brought into the operating room and placed under anesthesia in the dorsal lithotomy position. The patient was prepped and draped in a sterile fashion. A 21-Macedonian cystoscope with a 30-degree lens was passed into the bladder. The entire urethra was examined and found to be within normal limits. 30- degree endoscopy was utilized to inspect the entire bladder mucosal surface. There was no evidence of tumors, calculi, diverticula, or other abnormalities throughout the entire bladder. The ureteral orifices were normal in size, number, and location. The entire cystoscopic procedure was within normal limits. The bladder was drained.      A 5-Macedonian open-ended

## 2023-07-05 NOTE — ANESTHESIA POSTPROCEDURE EVALUATION
Department of Anesthesiology  Postprocedure Note    Patient: Citlaly Bhatia  MRN: [id number]  YOB: 1945  Date of evaluation: 7/5/2023      Procedure Summary     Date: 07/05/23 Room / Location: [facility] OR 06 / [facility]    Anesthesia Start: 4385 Anesthesia Stop: 6507    Procedure: CYSTOSCOPY RETROGRADE PYELOGRAM URETEROSCOPY J STENT LASER LITHOTRIPSY LEFT (Left: Bladder) Diagnosis:       Uric acid nephrolithiasis      Gross hematuria      (Uric acid nephrolithiasis [N20.0])      (Gross hematuria [R31.0])    Surgeons: Ernestina Skinner MD Responsible Provider: Joaquín Ross MD    Anesthesia Type: MAC ASA Status: 3          Anesthesia Type: MAC    Tyrese Phase I: Tyrese Score: 10    Tyrese Phase II:        Anesthesia Post Evaluation    Patient location during evaluation: bedside  Patient participation: complete - patient participated  Level of consciousness: awake and alert  Pain score: 0  Airway patency: patent  Nausea & Vomiting: no nausea and no vomiting  Complications: no  Cardiovascular status: blood pressure returned to baseline and hemodynamically stable  Respiratory status: acceptable, room air, spontaneous ventilation and nonlabored ventilation  Hydration status: stable

## 2023-07-23 LAB
MICROORGANISM SPEC CULT: ABNORMAL
SPECIMEN DESCRIPTION: ABNORMAL

## 2024-04-18 NOTE — PLAN OF CARE
Problem: Falls - Risk of:  Goal: Will remain free from falls  Will remain free from falls   Outcome: Met This Shift No (0)

## 2024-05-14 ENCOUNTER — HOSPITAL ENCOUNTER (EMERGENCY)
Age: 79
Discharge: LEFT BEFORE DISCHARGE | End: 2024-05-14
Attending: STUDENT IN AN ORGANIZED HEALTH CARE EDUCATION/TRAINING PROGRAM
Payer: MEDICARE

## 2024-05-14 VITALS
HEART RATE: 64 BPM | TEMPERATURE: 97.4 F | OXYGEN SATURATION: 100 % | SYSTOLIC BLOOD PRESSURE: 136 MMHG | RESPIRATION RATE: 18 BRPM | DIASTOLIC BLOOD PRESSURE: 80 MMHG

## 2024-05-14 DIAGNOSIS — N17.9 AKI (ACUTE KIDNEY INJURY) (HCC): ICD-10-CM

## 2024-05-14 DIAGNOSIS — R73.9 HYPERGLYCEMIA: Primary | ICD-10-CM

## 2024-05-14 LAB
ALBUMIN SERPL-MCNC: 4 G/DL (ref 3.5–5.2)
ALP SERPL-CCNC: 93 U/L (ref 35–104)
ALT SERPL-CCNC: 42 U/L (ref 0–32)
ANION GAP SERPL CALCULATED.3IONS-SCNC: 14 MMOL/L (ref 7–16)
ANION GAP SERPL CALCULATED.3IONS-SCNC: 14 MMOL/L (ref 7–16)
AST SERPL-CCNC: 40 U/L (ref 0–31)
B-OH-BUTYR SERPL-MCNC: 0.23 MMOL/L (ref 0.02–0.27)
BASOPHILS # BLD: 0.1 K/UL (ref 0–0.2)
BASOPHILS NFR BLD: 1 % (ref 0–2)
BILIRUB SERPL-MCNC: 0.5 MG/DL (ref 0–1.2)
BUN SERPL-MCNC: 32 MG/DL (ref 6–23)
BUN SERPL-MCNC: 36 MG/DL (ref 6–23)
CALCIUM SERPL-MCNC: 10.3 MG/DL (ref 8.6–10.2)
CALCIUM SERPL-MCNC: 9.5 MG/DL (ref 8.6–10.2)
CHLORIDE SERPL-SCNC: 102 MMOL/L (ref 98–107)
CHLORIDE SERPL-SCNC: 103 MMOL/L (ref 98–107)
CO2 SERPL-SCNC: 18 MMOL/L (ref 22–29)
CO2 SERPL-SCNC: 20 MMOL/L (ref 22–29)
CREAT SERPL-MCNC: 1.4 MG/DL (ref 0.5–1)
CREAT SERPL-MCNC: 1.5 MG/DL (ref 0.5–1)
EKG ATRIAL RATE: 57 BPM
EKG P AXIS: 26 DEGREES
EKG P-R INTERVAL: 184 MS
EKG Q-T INTERVAL: 440 MS
EKG QRS DURATION: 98 MS
EKG QTC CALCULATION (BAZETT): 428 MS
EKG R AXIS: 42 DEGREES
EKG T AXIS: 34 DEGREES
EKG VENTRICULAR RATE: 57 BPM
EOSINOPHIL # BLD: 0.3 K/UL (ref 0.05–0.5)
EOSINOPHILS RELATIVE PERCENT: 3 % (ref 0–6)
ERYTHROCYTE [DISTWIDTH] IN BLOOD BY AUTOMATED COUNT: 13.2 % (ref 11.5–15)
GFR, ESTIMATED: 35 ML/MIN/1.73M2
GFR, ESTIMATED: 40 ML/MIN/1.73M2
GLUCOSE BLD-MCNC: 186 MG/DL (ref 74–99)
GLUCOSE BLD-MCNC: 268 MG/DL (ref 74–99)
GLUCOSE SERPL-MCNC: 171 MG/DL (ref 74–99)
GLUCOSE SERPL-MCNC: 237 MG/DL (ref 74–99)
HCT VFR BLD AUTO: 42.9 % (ref 34–48)
HGB BLD-MCNC: 14.4 G/DL (ref 11.5–15.5)
IMM GRANULOCYTES # BLD AUTO: 0.06 K/UL (ref 0–0.58)
IMM GRANULOCYTES NFR BLD: 1 % (ref 0–5)
LYMPHOCYTES NFR BLD: 1.44 K/UL (ref 1.5–4)
LYMPHOCYTES RELATIVE PERCENT: 14 % (ref 20–42)
MCH RBC QN AUTO: 30 PG (ref 26–35)
MCHC RBC AUTO-ENTMCNC: 33.6 G/DL (ref 32–34.5)
MCV RBC AUTO: 89.4 FL (ref 80–99.9)
MONOCYTES NFR BLD: 1.02 K/UL (ref 0.1–0.95)
MONOCYTES NFR BLD: 10 % (ref 2–12)
NEUTROPHILS NFR BLD: 73 % (ref 43–80)
NEUTS SEG NFR BLD: 7.75 K/UL (ref 1.8–7.3)
PH VENOUS: 7.22 (ref 7.35–7.45)
PLATELET # BLD AUTO: 258 K/UL (ref 130–450)
PMV BLD AUTO: 9.8 FL (ref 7–12)
POTASSIUM SERPL-SCNC: 4.3 MMOL/L (ref 3.5–5)
POTASSIUM SERPL-SCNC: 5.3 MMOL/L (ref 3.5–5)
PROT SERPL-MCNC: 8.3 G/DL (ref 6.4–8.3)
RBC # BLD AUTO: 4.8 M/UL (ref 3.5–5.5)
SODIUM SERPL-SCNC: 135 MMOL/L (ref 132–146)
SODIUM SERPL-SCNC: 136 MMOL/L (ref 132–146)
TROPONIN I SERPL HS-MCNC: 10 NG/L (ref 0–9)
TROPONIN I SERPL HS-MCNC: 9 NG/L (ref 0–9)
WBC OTHER # BLD: 10.7 K/UL (ref 4.5–11.5)

## 2024-05-14 PROCEDURE — 85025 COMPLETE CBC W/AUTO DIFF WBC: CPT

## 2024-05-14 PROCEDURE — 2580000003 HC RX 258: Performed by: STUDENT IN AN ORGANIZED HEALTH CARE EDUCATION/TRAINING PROGRAM

## 2024-05-14 PROCEDURE — 80048 BASIC METABOLIC PNL TOTAL CA: CPT

## 2024-05-14 PROCEDURE — 84484 ASSAY OF TROPONIN QUANT: CPT

## 2024-05-14 PROCEDURE — 99284 EMERGENCY DEPT VISIT MOD MDM: CPT

## 2024-05-14 PROCEDURE — 93010 ELECTROCARDIOGRAM REPORT: CPT | Performed by: INTERNAL MEDICINE

## 2024-05-14 PROCEDURE — 80053 COMPREHEN METABOLIC PANEL: CPT

## 2024-05-14 PROCEDURE — 96360 HYDRATION IV INFUSION INIT: CPT

## 2024-05-14 PROCEDURE — 82800 BLOOD PH: CPT

## 2024-05-14 PROCEDURE — 82962 GLUCOSE BLOOD TEST: CPT

## 2024-05-14 PROCEDURE — 82010 KETONE BODYS QUAN: CPT

## 2024-05-14 PROCEDURE — 93005 ELECTROCARDIOGRAM TRACING: CPT | Performed by: STUDENT IN AN ORGANIZED HEALTH CARE EDUCATION/TRAINING PROGRAM

## 2024-05-14 RX ORDER — 0.9 % SODIUM CHLORIDE 0.9 %
1000 INTRAVENOUS SOLUTION INTRAVENOUS ONCE
Status: COMPLETED | OUTPATIENT
Start: 2024-05-14 | End: 2024-05-14

## 2024-05-14 RX ADMIN — SODIUM CHLORIDE 1000 ML: 9 INJECTION, SOLUTION INTRAVENOUS at 15:56

## 2024-05-14 NOTE — ED PROVIDER NOTES
is no abdominal tenderness. There is no guarding or rebound.   Musculoskeletal:      Cervical back: Normal range of motion and neck supple. No rigidity. No muscular tenderness.      Right lower leg: No edema.      Left lower leg: No edema.   Skin:     General: Skin is warm and dry.      Capillary Refill: Capillary refill takes less than 2 seconds.      Coloration: Skin is not pale.      Findings: No erythema or rash.   Neurological:      Mental Status: She is alert and oriented to person, place, and time.   Psychiatric:         Mood and Affect: Mood normal.          Procedures     MDM  Number of Diagnoses or Management Options  FRANCIS (acute kidney injury) (HCC)  Hyperglycemia  Diagnosis management comments: Debra Almendarez is a 78 year old female who presented to ED with concern for hyperglycemia.  Vital signs reviewed patient had normal /80, not tachycardic, afebrile, normal RR  Lab work was reviewed and interpreted by myself, potassium mildly elevated 5.3 GFR decreased from previous, patient is stable,   Patient BG improved to less than 200 at repeat evaluation following IVF, repeat BMP ordered, patient eloped from ED before repeat lab work resulted    History provided by patient  Co morbidities include htn, diverticulitis, hypothyroidism  Differential diagnosis includes not limited to dka, hyperglycemia, dehydration, uti  Social determents of health include chronic illness  Patient had also reported that she started a new medication and had increasing output in ostomy plan was to given additional IVF patient left before evaluation could be completed,               SEP-1 CORE MEASURE DATA      Sepsis Criteria   Severe Sepsis Criteria   Septic Shock Criteria     Must be confirmed or suspected to move forward with diagnosis of sepsis.    Must meet 2:    [] Temperature > 100.9 F (38.3 C)        or < 96.8 F (36 C)  [] HR > 90  [] RR > 20  [] WBC > 12 or < 4 or 10% bands    AND:    [] Infection Confirmed or

## 2024-05-15 ASSESSMENT — ENCOUNTER SYMPTOMS
ABDOMINAL DISTENTION: 0
DIARRHEA: 0
PHOTOPHOBIA: 0
VOMITING: 0
ABDOMINAL PAIN: 0
NAUSEA: 0
COUGH: 0
CHEST TIGHTNESS: 0
SHORTNESS OF BREATH: 0

## 2024-06-26 ENCOUNTER — TRANSCRIBE ORDERS (OUTPATIENT)
Dept: ADMINISTRATIVE | Age: 79
End: 2024-06-26

## 2024-06-26 DIAGNOSIS — G30.1 DEMENTIA OF THE ALZHEIMER'S TYPE, WITH LATE ONSET, WITH DELIRIUM (HCC): Primary | ICD-10-CM

## 2024-06-26 DIAGNOSIS — F02.82 DEMENTIA OF THE ALZHEIMER'S TYPE, WITH LATE ONSET, WITH DELIRIUM (HCC): Primary | ICD-10-CM

## 2024-07-03 ENCOUNTER — HOSPITAL ENCOUNTER (OUTPATIENT)
Dept: MRI IMAGING | Age: 79
Discharge: HOME | End: 2024-07-05
Attending: INTERNAL MEDICINE
Payer: MEDICARE

## 2024-07-03 DIAGNOSIS — F02.82 DEMENTIA OF THE ALZHEIMER'S TYPE, WITH LATE ONSET, WITH DELIRIUM (HCC): ICD-10-CM

## 2024-07-03 DIAGNOSIS — G30.1 DEMENTIA OF THE ALZHEIMER'S TYPE, WITH LATE ONSET, WITH DELIRIUM (HCC): ICD-10-CM

## 2024-07-03 PROCEDURE — A9577 INJ MULTIHANCE: HCPCS | Performed by: RADIOLOGY

## 2024-07-03 PROCEDURE — 6360000004 HC RX CONTRAST MEDICATION: Performed by: RADIOLOGY

## 2024-07-03 PROCEDURE — 70553 MRI BRAIN STEM W/O & W/DYE: CPT

## 2024-07-03 RX ADMIN — GADOBENATE DIMEGLUMINE 20 ML: 529 INJECTION, SOLUTION INTRAVENOUS at 15:32

## 2024-09-05 ENCOUNTER — TRANSCRIBE ORDERS (OUTPATIENT)
Dept: ADMINISTRATIVE | Age: 79
End: 2024-09-05

## 2024-09-05 DIAGNOSIS — Z12.31 ENCOUNTER FOR SCREENING MAMMOGRAM FOR MALIGNANT NEOPLASM OF BREAST: Primary | ICD-10-CM

## 2024-09-13 ENCOUNTER — HOSPITAL ENCOUNTER (OUTPATIENT)
Dept: MAMMOGRAPHY | Age: 79
Discharge: HOME | End: 2024-09-15
Payer: MEDICARE

## 2024-09-13 DIAGNOSIS — Z12.31 ENCOUNTER FOR SCREENING MAMMOGRAM FOR MALIGNANT NEOPLASM OF BREAST: ICD-10-CM

## 2024-09-13 PROCEDURE — 77063 BREAST TOMOSYNTHESIS BI: CPT

## 2025-06-13 ENCOUNTER — OFFICE VISIT (OUTPATIENT)
Dept: ORTHOPEDIC SURGERY | Age: 80
End: 2025-06-13
Payer: MEDICARE

## 2025-06-13 VITALS — WEIGHT: 237 LBS | HEIGHT: 63 IN | BODY MASS INDEX: 41.99 KG/M2 | TEMPERATURE: 98.6 F

## 2025-06-13 DIAGNOSIS — M25.561 ACUTE PAIN OF RIGHT KNEE: Primary | ICD-10-CM

## 2025-06-13 DIAGNOSIS — M17.11 ARTHRITIS OF KNEE, RIGHT: Primary | ICD-10-CM

## 2025-06-13 PROCEDURE — 1125F AMNT PAIN NOTED PAIN PRSNT: CPT | Performed by: ORTHOPAEDIC SURGERY

## 2025-06-13 PROCEDURE — 99203 OFFICE O/P NEW LOW 30 MIN: CPT | Performed by: ORTHOPAEDIC SURGERY

## 2025-06-13 PROCEDURE — 1123F ACP DISCUSS/DSCN MKR DOCD: CPT | Performed by: ORTHOPAEDIC SURGERY

## 2025-06-13 PROCEDURE — 1159F MED LIST DOCD IN RCRD: CPT | Performed by: ORTHOPAEDIC SURGERY

## 2025-06-13 PROCEDURE — 1160F RVW MEDS BY RX/DR IN RCRD: CPT | Performed by: ORTHOPAEDIC SURGERY

## 2025-06-13 NOTE — PROGRESS NOTES
Debra Almendarez is a 80 y.o. female, who presents   Chief Complaint   Patient presents with    Knee Pain     Right knee pain. Hx of bilateral TKA. Patient states right knee is throbbing constantly. Pain level today 10/10.        HPI:: Debra sees us today for right knee pain which is about 2 days in onset.  She has no history of injury in the last few days.  She has anterior knee pain over the kneecap and difficulty moving it.  She had a total knee replacement done by me in 2019 and did well afterwards.  She is concerned about problems with tendons or with stability of the joint.    Allergies; medications; past medical, surgical, family, and social history; and problem list have been reviewed today and updated as indicated in this encounter - see below following Ortho specifics.    Musculoskeletal: Skin condition and gross neurovascular functions good in the right lower extremity.  Hip range of motion is good with no pain or instability.  Right knee range of motion is heavily guarded in the range allowed is only 5 to 40 degrees.  There is no palpable defect in patellar or quadriceps tendons.  She is able to actively extend her knee after some coaching.  She has stable collateral ligaments and good anterior posterior dynamic stability.  Her calf is supple with no tenderness.  There is no hyperemia or erythema about the knee and no impression of effusion.    Radiologic Studies: Imaging of the right knee today shows stable cemented total knee replacement arthroplasty in good alignment.  There is no suggestion of tendon disruption in patellar or quadriceps tendons.    ASSESSMENT:  Debra was seen today for knee pain.    Diagnoses and all orders for this visit:    Acute pain of right knee     Treatment alternatives were reviewed including medical and physical therapies, injections, and surgical options, expected risks benefits and likely outcome of each were discussed in detail, questions asked and answered and

## (undated) DEVICE — GENERATOR ELECSURG FORCETRAID

## (undated) DEVICE — MEDI-VAC YANKAUER SUCTION HANDLE: Brand: CARDINAL HEALTH

## (undated) DEVICE — ARM DRAPE

## (undated) DEVICE — TRI-LUMEN FILTERED TUBE SET WITH ACTIVATED CHARCOAL FILTER: Brand: AIRSEAL

## (undated) DEVICE — ANTI-FOG SOLUTION WITH FOAM PAD: Brand: DEVON

## (undated) DEVICE — 3M™ IOBAN™ 2 ANTIMICROBIAL INCISE DRAPE 6640EZ: Brand: IOBAN™ 2

## (undated) DEVICE — SOLUTION IRRIG 3000ML STRL H2O USP UROMATIC PLAS CONT

## (undated) DEVICE — Z DISCONTINUED NO SUB IDED GLOVE SURG BEAD CUF 8 STD PF WHT STRL TRIUMPH LT LTX

## (undated) DEVICE — SHEET DRAPE FULL 70X100

## (undated) DEVICE — STAPLER INT L75MM CUT LN L73MM STPL LN L77MM BLU B FRM 8

## (undated) DEVICE — TOWEL,OR,DSP,ST,BLUE,STD,6/PK,12PK/CS: Brand: MEDLINE

## (undated) DEVICE — GLOVE ORANGE PI 7 1/2   MSG9075

## (undated) DEVICE — BLADE ES ELASTOMERIC COAT INSUL DURABLE BEND UPTO 90DEG

## (undated) DEVICE — NEEDLE HYPO 25GA L1.5IN BLU POLYPR HUB S STL REG BVL STR

## (undated) DEVICE — GRADUATE

## (undated) DEVICE — TUBING, SUCTION, 1/4" X 10', STRAIGHT: Brand: MEDLINE

## (undated) DEVICE — Device: Brand: DEFENDO VALVE AND CONNECTOR KIT

## (undated) DEVICE — INTENDED FOR TISSUE SEPARATION, AND OTHER PROCEDURES THAT REQUIRE A SHARP SURGICAL BLADE TO PUNCTURE OR CUT.: Brand: BARD-PARKER ® STAINLESS STEEL BLADES

## (undated) DEVICE — YANKAUER,BULB TIP,W/O VENT,RIGID,STERILE: Brand: MEDLINE

## (undated) DEVICE — BANDAGE COMPR W6INXL5YD SELF ADH COHESIVE CO FLX

## (undated) DEVICE — STAPLER EXT 65MM S STL AUTO DISP PURSTRING

## (undated) DEVICE — SET MAJOR INSTR HOUSE

## (undated) DEVICE — PLUMEPORT LAPAROSCOPIC SMOKE FILTRATION DEVICE: Brand: PLUMEPORT ACTIV

## (undated) DEVICE — GARMENT,MEDLINE,DVT,INT,CALF,MED, GEN2: Brand: MEDLINE

## (undated) DEVICE — TROCAR: Brand: KII SLEEVE

## (undated) DEVICE — BASIC SINGLE BASIN 1-LF: Brand: MEDLINE INDUSTRIES, INC.

## (undated) DEVICE — COLOSTOMY/ILEOSTOMY KIT,FLEXWEAR: Brand: NEW IMAGE

## (undated) DEVICE — NDL CNTR 40CT FM MAG: Brand: MEDLINE INDUSTRIES, INC.

## (undated) DEVICE — TROCAR: Brand: KII FIOS FIRST ENTRY

## (undated) DEVICE — MARKER,SKIN,WI/RULER AND LABELS: Brand: MEDLINE

## (undated) DEVICE — SUTURE STRATAFIX SPRL PDS + SZ 0 L9IN ABSRB VLT CT-1 L36MM SXPP1B455

## (undated) DEVICE — SET MAJOR INSTR ORTHO

## (undated) DEVICE — PROGRASP FORCEPS: Brand: ENDOWRIST

## (undated) DEVICE — GAUZE,SPONGE,4"X4",8PLY,STRL,LF,10/TRAY: Brand: MEDLINE

## (undated) DEVICE — GAUZE,SPONGE,4"X4",16PLY,STRL,LF,10/TRAY: Brand: MEDLINE

## (undated) DEVICE — SCISSORS SURG DIA8MM MPLR CRV ENDOWRIST

## (undated) DEVICE — DOUBLE BASIN SET: Brand: MEDLINE INDUSTRIES, INC.

## (undated) DEVICE — GOWN,SIRUS,POLYRNF,BRTHSLV,XLN/XL,20/CS: Brand: MEDLINE

## (undated) DEVICE — URETERAL ACCESS SHEATH SET: Brand: NAVIGATOR HD

## (undated) DEVICE — GOWN ISOLATN REG YEL M WT MULTIPLY SIDETIE LEV 2

## (undated) DEVICE — HYDROPHILIC COATED RED RUBBER URETHRAL CATHETER, SMOOTH ROUNDED TIP, 20 FR (6.7 MM): Brand: DOVER

## (undated) DEVICE — TIP COVER ACCESSORY

## (undated) DEVICE — MASK,FACE,MAXFLUIDPROTECT,SHIELD/ERLPS: Brand: MEDLINE

## (undated) DEVICE — 4-PORT MANIFOLD: Brand: NEPTUNE 2

## (undated) DEVICE — SHEET,DRAPE,40X58,STERILE: Brand: MEDLINE

## (undated) DEVICE — KIT BEDSIDE REVITAL OX 500ML

## (undated) DEVICE — COVER,TABLE,44X90,STERILE: Brand: MEDLINE

## (undated) DEVICE — SUTURE BAG: Brand: DEVON

## (undated) DEVICE — SET ENDO INSTR RED YEL LAPAROSCOPIC

## (undated) DEVICE — PUMP SUC IRR TBNG L10FT W/ HNDPC ASSEMB STRYKEFLOW 2

## (undated) DEVICE — GOWN,SIRUS,NONRNF,SETINSLV,XL,20/CS: Brand: MEDLINE

## (undated) DEVICE — GLOVE ORANGE PI 8   MSG9080

## (undated) DEVICE — MEDICINE CUP, GRADUATED, STER: Brand: MEDLINE

## (undated) DEVICE — SET ENDO INSTR LAPAROSCOPIC STGENLAP

## (undated) DEVICE — NEEDLE SCLERO 25GA L240CM OD0.51MM ID0.24MM EXTN L4MM SHTH

## (undated) DEVICE — SYRINGE MED 30ML STD CLR PLAS LUERLOCK TIP N CTRL DISP

## (undated) DEVICE — PMI PTFE COATED LAPAROSCOPIC WIRE L-HOOK 44 CM: Brand: PMI

## (undated) DEVICE — SYRINGE MED 10ML TRNSLUC BRL PLUNG BLK MRK POLYPR CTRL

## (undated) DEVICE — WARMER SCP LAP

## (undated) DEVICE — FLEXIVA  PULSE  AND  FLEXIVA  PULSE  TRACTIP  LASER  FIBERS  ARE  HIGH  POWER  SINGLE-USE FIBER: Brand: FLEXIVA PULSE ID

## (undated) DEVICE — APPLICATOR MEDICATED 26 CC SOLUTION HI LT ORNG CHLORAPREP

## (undated) DEVICE — SCOPE DAVINCI XI 30 DEG W/CORD

## (undated) DEVICE — PACK SURG LAP CHOLE CUSTOM

## (undated) DEVICE — BANDAGE COMPR L W4INXL11YD 100% COT WVN E DBL LEN CLP CLSR

## (undated) DEVICE — [HIGH FLOW INSUFFLATOR,  DO NOT USE IF PACKAGE IS DAMAGED,  KEEP DRY,  KEEP AWAY FROM SUNLIGHT,  PROTECT FROM HEAT AND RADIOACTIVE SOURCES.]: Brand: PNEUMOSURE

## (undated) DEVICE — CAMERA STRYKER 1488 HD GEN

## (undated) DEVICE — SPONGE GZ 4IN 4IN 4 PLY N WVN AVANT

## (undated) DEVICE — MEGA NEEDLE DRIVER: Brand: ENDOWRIST

## (undated) DEVICE — GOWN,SIRUS,FABRNF,2XL,18/CS: Brand: MEDLINE

## (undated) DEVICE — RELOAD STPL L60MM H1-2.6MM MESENTERY THN TISS WHT 6 ROW

## (undated) DEVICE — SOLUTION IV IRRIG WATER 1000ML POUR BRL 2F7114

## (undated) DEVICE — GAUZE,SPONGE,4"X4",16PLY,XRAY,STRL,LF: Brand: MEDLINE

## (undated) DEVICE — RELOAD STPL L60MM H1.5-3.6MM REG TISS BLU GRIPPING SURF B

## (undated) DEVICE — SYRINGE MED 50ML LUERLOCK TIP

## (undated) DEVICE — SPONGE,LAP,12"X12",XR,ST,5/PK,40PK/CS: Brand: MEDLINE

## (undated) DEVICE — ACCESS PLATFORM FOR MINIMALLY INVASIVE SURGERY: Brand: GELPOINT® ADVANCED ACCESS PLATFORM

## (undated) DEVICE — LUBRICANT SURG JELLY ST BACTER TUBE 4.25OZ

## (undated) DEVICE — PAD,ABDOMINAL,8"X10",ST,LF: Brand: MEDLINE

## (undated) DEVICE — READY WET SKIN SCRUB TRAY-LF: Brand: MEDLINE INDUSTRIES, INC.

## (undated) DEVICE — ELECTRODE PT RET AD L9FT HI MOIST COND ADH HYDRGEL CORDED

## (undated) DEVICE — SIGMOIDOSCOPIC SUCTION INSTRUMENT 18 FR W/WINGED CAP CONTROL AND 6 FOOT (1.8M) TUBING: Brand: SIGMOIDOSCOPIC

## (undated) DEVICE — PACK,UNIVERSAL,NO GOWNS: Brand: MEDLINE

## (undated) DEVICE — TOTAL TRAY, 16FR 10ML SIL FOLEY, URN: Brand: MEDLINE

## (undated) DEVICE — BAG DRNGE COMB PK

## (undated) DEVICE — CANNULA SEAL

## (undated) DEVICE — TRAY PROCED CUSTOM RECTAL

## (undated) DEVICE — APPLIER CLP M/L SHFT DIA5MM 15 LIG LIGAMAX 5

## (undated) DEVICE — SET SURG INSTR SIGMOID REUSABLE

## (undated) DEVICE — RELOAD STPL L75MM OPN H3.8MM CLS 1.5MM WIRE DIA0.2MM REG

## (undated) DEVICE — SHEET SUPPORT

## (undated) DEVICE — GAUZE,SPONGE,2"X2",8PLY,STERILE,LF,2'S: Brand: MEDLINE

## (undated) DEVICE — PADDING,UNDERCAST,COTTON, 4"X4YD STERILE: Brand: MEDLINE

## (undated) DEVICE — STAPLER SKIN L440MM 32MM LNG 12 FIRING B FRM PWR + GRIPPING

## (undated) DEVICE — SYRINGE IRRIG 60ML SFT PLIABLE BLB EZ TO GRP 1 HND USE W/

## (undated) DEVICE — Z DISCONTINUED USE 2275686 GLOVE SURG SZ 8 L12IN FNGR THK13MIL WHT ISOLEX POLYISOPRENE

## (undated) DEVICE — INSUFFLATION NEEDLE TO ESTABLISH PNEUMOPERITONEUM.: Brand: INSUFFLATION NEEDLE

## (undated) DEVICE — SET EXTN L14IN 1ML IV L BOR NO FLTR NO STPCOCK

## (undated) DEVICE — Z INACTIVE USE 2660664 SOLUTION IRRIG 3000ML 0.9% SOD CHL USP UROMATIC PLAS CONT

## (undated) DEVICE — VASELINE PETROLATUM GAUZE STRIP: Brand: VASELINE

## (undated) DEVICE — SOLUTION IV IRRIG POUR BRL 0.9% SODIUM CHL 2F7124

## (undated) DEVICE — LAPAROSCOPIC ACCESS SYSTEM: Brand: ALEXIS LAPAROSCOPIC SYSTEM WITH KII FIOS FIRST ENTRY

## (undated) DEVICE — ELECTRO LUBE IS A SINGLE PATIENT USE DEVICE THAT IS INTENDED TO BE USED ON ELECTROSURGICAL ELECTRODES TO REDUCE STICKING.: Brand: KEY SURGICAL ELECTRO LUBE

## (undated) DEVICE — PENCIL ES L3M BTTN SWCH HOLSTER W/ BLDE ELECTRD EDGE

## (undated) DEVICE — CANNULA IV 18GA L15IN BLNT FILL LUERLOCK HUB MJCT

## (undated) DEVICE — FORCEPS BX L240CM JAW DIA2.8MM L CAP W/ NDL MIC MESH TOOTH

## (undated) DEVICE — 3M™ STERI-DRAPE™ U-DRAPE 1015: Brand: STERI-DRAPE™

## (undated) DEVICE — 40586 ADVANCED TRENDELENBURG POSITIONING KIT: Brand: 40586 ADVANCED TRENDELENBURG POSITIONING KIT

## (undated) DEVICE — PMI PTFE COATED LAPAROSCOPIC WIRE L-HOOK 33 CM: Brand: PMI

## (undated) DEVICE — 6 X 9  1.75MIL 4-WALL LABGUARD: Brand: MINIGRIP COMMERCIAL LLC

## (undated) DEVICE — COVER,TABLE,60X90,STERILE: Brand: MEDLINE

## (undated) DEVICE — SYRINGE 20ML LL S/C 50

## (undated) DEVICE — GOWN,SIRUS,FABRNF,XL,20/CS: Brand: MEDLINE

## (undated) DEVICE — MICRO TIP WIPE: Brand: DEVON

## (undated) DEVICE — KENDALL 450 SERIES MONITORING FOAM ELECTRODE - RECTANGULAR SHAPE ( 3/PK): Brand: KENDALL

## (undated) DEVICE — CHLORAPREP 26ML ORANGE

## (undated) DEVICE — LAPAROSCOPIC SCISSORS: Brand: EPIX LAPAROSCOPIC SCISSORS

## (undated) DEVICE — SOLUTION IRRIG 3000ML 0.9% SOD CHL USP UROMATIC PLAS CONT

## (undated) DEVICE — MASTISOL ADHESIVE LIQ 2/3ML

## (undated) DEVICE — CONTAINER SPEC COLL 960ML POLYPR TRIANG GRAD INTAKE/OUTPUT

## (undated) DEVICE — Z DISCONTINUED SUGG SUB 2622703 KIT OST L12IN FLNG DIA70MM ST TRNSPAR TWO PC MOLD

## (undated) DEVICE — COVER,LIGHT HANDLE,FLX,1/PK: Brand: MEDLINE INDUSTRIES, INC.

## (undated) DEVICE — SUTURE ABSRB L6IN L37MM 0 GS-21 GRN 1/2 CIR TAPR PNT NDL VLOCL0306

## (undated) DEVICE — SPONGE LAP W18XL18IN WHT COT 4 PLY FLD STRUNG RADPQ DISP ST

## (undated) DEVICE — TRAY PROCED CUSTOM GASTROINTESTINAL

## (undated) DEVICE — PACK,EXTREMITY,ORTHOMAX,5/CS: Brand: MEDLINE

## (undated) DEVICE — Device

## (undated) DEVICE — SEALER/DIVIDER LAP SHFT L44CM DIA5MM STR BLNT TIP JAW HND

## (undated) DEVICE — Device: Brand: INSTRUSAFE

## (undated) DEVICE — CONTROL SYRINGE LUER-LOCK TIP: Brand: MONOJECT

## (undated) DEVICE — APPLIER CLP L SHFT DIA12MM 20 ROT MULT LIGACLP

## (undated) DEVICE — SUTURE V-LOC 180 SZ 0 L9IN ABSRB GRN GS-21 L37MM 1/2 CIR VLOCL0346

## (undated) DEVICE — SET INST DAVINCI XI ACCESSORIES

## (undated) DEVICE — Device: Brand: SPOT EX ENDOSCOPIC TATTOO

## (undated) DEVICE — GUIDEWIRE ENDO L150CM DIA0.035IN STR TIP (QTY = EACH)

## (undated) DEVICE — TUBING, SUCTION, 3/16" X 12', STRAIGHT: Brand: MEDLINE

## (undated) DEVICE — STRYKER PERFORMANCE SERIES SAGITTAL BLADE: Brand: STRYKER PERFORMANCE SERIES

## (undated) DEVICE — CYSTO PACK: Brand: MEDLINE INDUSTRIES, INC.

## (undated) DEVICE — Z DISCONTINUED PER MEDLINE USE 2741944 DRESSING AQUACEL 12 IN SURG W9XL30CM SIL CVR WTRPRF VIR BACT BARR ANTIMIC

## (undated) DEVICE — PATIENT RETURN ELECTRODE, SINGLE-USE, CONTACT QUALITY MONITORING, ADULT, WITH 9FT CORD, FOR PATIENTS WEIGING OVER 33LBS. (15KG): Brand: MEGADYNE

## (undated) DEVICE — SYRINGE MED 10ML LUERLOCK TIP W/O SFTY DISP

## (undated) DEVICE — STANDARD HYPODERMIC NEEDLE,POLYPROPYLENE HUB: Brand: MONOJECT

## (undated) DEVICE — NEEDLE FLTR 18GA L1.5IN MEM THK5UM BLNT DISP

## (undated) DEVICE — STRIP,CLOSURE,WOUND,MEDI-STRIP,1/2X4: Brand: MEDLINE

## (undated) DEVICE — BANDAGE COMPR W6INXL12FT SMOOTH FOR LIMB EXSANG ESMARCH

## (undated) DEVICE — PAD,ABDOMINAL,5"X9",ST,LF,25/BX: Brand: MEDLINE INDUSTRIES, INC.

## (undated) DEVICE — SOLUTION SCRB 4OZ 4% CHG H2O AIDED FOR PREOPERATIVE SKIN

## (undated) DEVICE — COLUMN DRAPE